# Patient Record
Sex: FEMALE | Race: WHITE | Employment: PART TIME | URBAN - NONMETROPOLITAN AREA
[De-identification: names, ages, dates, MRNs, and addresses within clinical notes are randomized per-mention and may not be internally consistent; named-entity substitution may affect disease eponyms.]

---

## 2017-07-22 ENCOUNTER — HOSPITAL ENCOUNTER (EMERGENCY)
Age: 37
Discharge: HOME | End: 2017-07-23
Attending: FAMILY MEDICINE
Payer: COMMERCIAL

## 2017-07-22 ENCOUNTER — APPOINTMENT (OUTPATIENT)
Dept: CT IMAGING | Age: 37
End: 2017-07-22
Payer: COMMERCIAL

## 2017-07-22 DIAGNOSIS — E03.9 HYPOTHYROIDISM, UNSPECIFIED TYPE: ICD-10-CM

## 2017-07-22 DIAGNOSIS — R20.0 NUMBNESS: Primary | ICD-10-CM

## 2017-07-22 DIAGNOSIS — G56.02 CARPAL TUNNEL SYNDROME OF LEFT WRIST: ICD-10-CM

## 2017-07-22 LAB
ANION GAP SERPL CALCULATED.3IONS-SCNC: 13 MEQ/L (ref 8–16)
BASOPHILS # BLD: 0.8 %
BASOPHILS ABSOLUTE: 0.1 THOU/MM3 (ref 0–0.1)
BUN BLDV-MCNC: 17 MG/DL (ref 7–22)
CALCIUM SERPL-MCNC: 9 MG/DL (ref 8.5–10.5)
CHLORIDE BLD-SCNC: 102 MEQ/L (ref 98–111)
CO2: 23 MEQ/L (ref 23–33)
CREAT SERPL-MCNC: 0.8 MG/DL (ref 0.4–1.2)
D-DIMER QUANTITATIVE: 667 NG/ML FEU (ref 0–500)
EOSINOPHIL # BLD: 3.6 %
EOSINOPHILS ABSOLUTE: 0.3 THOU/MM3 (ref 0–0.4)
GFR SERPL CREATININE-BSD FRML MDRD: 80 ML/MIN/1.73M2
GLUCOSE BLD-MCNC: 101 MG/DL (ref 70–108)
GLUCOSE BLD-MCNC: 97 MG/DL (ref 70–108)
HCT VFR BLD CALC: 39.3 % (ref 37–47)
HEMOGLOBIN: 13.3 GM/DL (ref 12–16)
LYMPHOCYTES # BLD: 31.1 %
LYMPHOCYTES ABSOLUTE: 2.2 THOU/MM3 (ref 1–4.8)
MAGNESIUM: 1.7 MG/DL (ref 1.6–2.4)
MCH RBC QN AUTO: 29.3 PG (ref 27–31)
MCHC RBC AUTO-ENTMCNC: 33.9 GM/DL (ref 33–37)
MCV RBC AUTO: 86.5 FL (ref 81–99)
MONOCYTES # BLD: 6.1 %
MONOCYTES ABSOLUTE: 0.4 THOU/MM3 (ref 0.4–1.3)
NUCLEATED RED BLOOD CELLS: 0 /100 WBC
OSMOLALITY CALCULATION: 277.1 MOSMOL/KG (ref 275–300)
PDW BLD-RTO: 14.4 % (ref 11.5–14.5)
PLATELET # BLD: 294 THOU/MM3 (ref 130–400)
PMV BLD AUTO: 7.8 MCM (ref 7.4–10.4)
POTASSIUM SERPL-SCNC: 4.4 MEQ/L (ref 3.5–5.2)
RBC # BLD: 4.54 MILL/MM3 (ref 4.2–5.4)
RBC # BLD: NORMAL 10*6/UL
SEG NEUTROPHILS: 58.4 %
SEGMENTED NEUTROPHILS ABSOLUTE COUNT: 4.1 THOU/MM3 (ref 1.8–7.7)
SODIUM BLD-SCNC: 138 MEQ/L (ref 135–145)
WBC # BLD: 7 THOU/MM3 (ref 4.8–10.8)

## 2017-07-22 PROCEDURE — 83735 ASSAY OF MAGNESIUM: CPT

## 2017-07-22 PROCEDURE — 84443 ASSAY THYROID STIM HORMONE: CPT

## 2017-07-22 PROCEDURE — 80048 BASIC METABOLIC PNL TOTAL CA: CPT

## 2017-07-22 PROCEDURE — 85379 FIBRIN DEGRADATION QUANT: CPT

## 2017-07-22 PROCEDURE — 84484 ASSAY OF TROPONIN QUANT: CPT

## 2017-07-22 PROCEDURE — 85025 COMPLETE CBC W/AUTO DIFF WBC: CPT

## 2017-07-22 PROCEDURE — 93005 ELECTROCARDIOGRAM TRACING: CPT

## 2017-07-22 PROCEDURE — 87086 URINE CULTURE/COLONY COUNT: CPT

## 2017-07-22 PROCEDURE — 99285 EMERGENCY DEPT VISIT HI MDM: CPT

## 2017-07-22 PROCEDURE — 84439 ASSAY OF FREE THYROXINE: CPT

## 2017-07-22 PROCEDURE — 82948 REAGENT STRIP/BLOOD GLUCOSE: CPT

## 2017-07-22 PROCEDURE — 36415 COLL VENOUS BLD VENIPUNCTURE: CPT

## 2017-07-22 RX ORDER — SODIUM CHLORIDE 9 MG/ML
INJECTION, SOLUTION INTRAVENOUS CONTINUOUS
Status: DISCONTINUED | OUTPATIENT
Start: 2017-07-22 | End: 2017-07-23 | Stop reason: HOSPADM

## 2017-07-22 RX ORDER — LORAZEPAM 2 MG/ML
0.5 INJECTION INTRAMUSCULAR ONCE
Status: COMPLETED | OUTPATIENT
Start: 2017-07-22 | End: 2017-07-23

## 2017-07-22 ASSESSMENT — ENCOUNTER SYMPTOMS
COUGH: 0
VOMITING: 0
NAUSEA: 0
ABDOMINAL PAIN: 0
RHINORRHEA: 0
WHEEZING: 0
SORE THROAT: 0
DIARRHEA: 0
EYE DISCHARGE: 0
EYE PAIN: 0
BACK PAIN: 0
SHORTNESS OF BREATH: 0

## 2017-07-22 ASSESSMENT — PAIN DESCRIPTION - PAIN TYPE: TYPE: SURGICAL PAIN

## 2017-07-22 ASSESSMENT — PAIN SCALES - GENERAL: PAINLEVEL_OUTOF10: 5

## 2017-07-22 ASSESSMENT — PAIN DESCRIPTION - ORIENTATION: ORIENTATION: RIGHT

## 2017-07-22 ASSESSMENT — PAIN DESCRIPTION - LOCATION: LOCATION: KNEE

## 2017-07-23 ENCOUNTER — APPOINTMENT (OUTPATIENT)
Dept: CT IMAGING | Age: 37
End: 2017-07-23
Payer: COMMERCIAL

## 2017-07-23 ENCOUNTER — APPOINTMENT (OUTPATIENT)
Dept: GENERAL RADIOLOGY | Age: 37
End: 2017-07-23
Payer: COMMERCIAL

## 2017-07-23 VITALS
BODY MASS INDEX: 40.72 KG/M2 | HEART RATE: 68 BPM | DIASTOLIC BLOOD PRESSURE: 64 MMHG | TEMPERATURE: 98.1 F | WEIGHT: 260 LBS | RESPIRATION RATE: 18 BRPM | OXYGEN SATURATION: 98 % | SYSTOLIC BLOOD PRESSURE: 114 MMHG

## 2017-07-23 LAB
BACTERIA: ABNORMAL /HPF
BILIRUBIN URINE: NEGATIVE
BLOOD, URINE: NEGATIVE
CASTS 2: ABNORMAL /LPF
CASTS UA: ABNORMAL /LPF
CHARACTER, URINE: ABNORMAL
COLOR: YELLOW
CRYSTALS, UA: ABNORMAL
EKG ATRIAL RATE: 78 BPM
EKG P AXIS: 59 DEGREES
EKG P-R INTERVAL: 124 MS
EKG Q-T INTERVAL: 364 MS
EKG QRS DURATION: 90 MS
EKG QTC CALCULATION (BAZETT): 414 MS
EKG R AXIS: 70 DEGREES
EKG T AXIS: 58 DEGREES
EKG VENTRICULAR RATE: 78 BPM
EPITHELIAL CELLS, UA: ABNORMAL /HPF
GLUCOSE URINE: NEGATIVE MG/DL
KETONES, URINE: NEGATIVE
LEUKOCYTE ESTERASE, URINE: ABNORMAL
MISCELLANEOUS 2: ABNORMAL
NITRITE, URINE: NEGATIVE
PH UA: 6
PREGNANCY, URINE: NEGATIVE
PROTEIN UA: NEGATIVE
RBC URINE: ABNORMAL /HPF
RENAL EPITHELIAL, UA: ABNORMAL
SPECIFIC GRAVITY, URINE: 1.02 (ref 1–1.03)
T4 FREE: 0.88 NG/DL (ref 0.93–1.76)
TROPONIN T: < 0.01 NG/ML
TSH SERPL DL<=0.05 MIU/L-ACNC: 5.17 UIU/ML (ref 0.4–4.2)
UROBILINOGEN, URINE: 0.2 EU/DL
WBC UA: ABNORMAL /HPF
YEAST: ABNORMAL

## 2017-07-23 PROCEDURE — 2580000003 HC RX 258: Performed by: FAMILY MEDICINE

## 2017-07-23 PROCEDURE — 6370000000 HC RX 637 (ALT 250 FOR IP): Performed by: FAMILY MEDICINE

## 2017-07-23 PROCEDURE — 70450 CT HEAD/BRAIN W/O DYE: CPT

## 2017-07-23 PROCEDURE — 81025 URINE PREGNANCY TEST: CPT

## 2017-07-23 PROCEDURE — 6360000004 HC RX CONTRAST MEDICATION: Performed by: FAMILY MEDICINE

## 2017-07-23 PROCEDURE — 6360000002 HC RX W HCPCS: Performed by: FAMILY MEDICINE

## 2017-07-23 PROCEDURE — 81001 URINALYSIS AUTO W/SCOPE: CPT

## 2017-07-23 PROCEDURE — 71275 CT ANGIOGRAPHY CHEST: CPT

## 2017-07-23 PROCEDURE — 96361 HYDRATE IV INFUSION ADD-ON: CPT

## 2017-07-23 PROCEDURE — 96374 THER/PROPH/DIAG INJ IV PUSH: CPT

## 2017-07-23 RX ORDER — OXYCODONE HYDROCHLORIDE AND ACETAMINOPHEN 5; 325 MG/1; MG/1
1 TABLET ORAL ONCE
Status: COMPLETED | OUTPATIENT
Start: 2017-07-23 | End: 2017-07-23

## 2017-07-23 RX ADMIN — LORAZEPAM 0.5 MG: 2 INJECTION INTRAMUSCULAR; INTRAVENOUS at 00:29

## 2017-07-23 RX ADMIN — SODIUM CHLORIDE: 9 INJECTION, SOLUTION INTRAVENOUS at 00:29

## 2017-07-23 RX ADMIN — IOPAMIDOL 80 ML: 755 INJECTION, SOLUTION INTRAVENOUS at 00:40

## 2017-07-23 RX ADMIN — OXYCODONE HYDROCHLORIDE AND ACETAMINOPHEN 1 TABLET: 5; 325 TABLET ORAL at 01:46

## 2017-07-23 ASSESSMENT — PAIN SCALES - GENERAL: PAINLEVEL_OUTOF10: 7

## 2017-07-25 LAB
ORGANISM: ABNORMAL
URINE CULTURE REFLEX: ABNORMAL

## 2017-09-22 ENCOUNTER — HOSPITAL ENCOUNTER (EMERGENCY)
Age: 37
Discharge: HOME | End: 2017-09-22
Payer: COMMERCIAL

## 2017-09-22 VITALS
SYSTOLIC BLOOD PRESSURE: 123 MMHG | WEIGHT: 274.5 LBS | HEART RATE: 79 BPM | DIASTOLIC BLOOD PRESSURE: 95 MMHG | TEMPERATURE: 97.2 F | RESPIRATION RATE: 20 BRPM | HEIGHT: 67 IN | BODY MASS INDEX: 43.08 KG/M2 | OXYGEN SATURATION: 95 %

## 2017-09-22 DIAGNOSIS — J20.9 ACUTE BRONCHITIS WITH BRONCHOSPASM: Primary | ICD-10-CM

## 2017-09-22 DIAGNOSIS — Z72.0 TOBACCO ABUSE: ICD-10-CM

## 2017-09-22 DIAGNOSIS — J01.10 ACUTE NON-RECURRENT FRONTAL SINUSITIS: ICD-10-CM

## 2017-09-22 PROCEDURE — 99214 OFFICE O/P EST MOD 30 MIN: CPT | Performed by: NURSE PRACTITIONER

## 2017-09-22 PROCEDURE — 96372 THER/PROPH/DIAG INJ SC/IM: CPT

## 2017-09-22 PROCEDURE — 99212 OFFICE O/P EST SF 10 MIN: CPT

## 2017-09-22 PROCEDURE — 6360000002 HC RX W HCPCS: Performed by: NURSE PRACTITIONER

## 2017-09-22 RX ORDER — METHYLPREDNISOLONE ACETATE 80 MG/ML
80 INJECTION, SUSPENSION INTRA-ARTICULAR; INTRALESIONAL; INTRAMUSCULAR; SOFT TISSUE ONCE
Status: COMPLETED | OUTPATIENT
Start: 2017-09-22 | End: 2017-09-22

## 2017-09-22 RX ORDER — AMITRIPTYLINE HYDROCHLORIDE 25 MG/1
2300 TABLET, FILM COATED ORAL NIGHTLY
COMMUNITY
End: 2019-03-08

## 2017-09-22 RX ORDER — ALBUTEROL SULFATE 90 UG/1
2 AEROSOL, METERED RESPIRATORY (INHALATION) EVERY 6 HOURS PRN
Qty: 1 INHALER | Refills: 0 | Status: SHIPPED | OUTPATIENT
Start: 2017-09-22 | End: 2019-02-19 | Stop reason: ALTCHOICE

## 2017-09-22 RX ORDER — DOXYCYCLINE HYCLATE 100 MG/1
100 CAPSULE ORAL 2 TIMES DAILY
Qty: 14 CAPSULE | Refills: 0 | Status: SHIPPED | OUTPATIENT
Start: 2017-09-22 | End: 2017-09-29

## 2017-09-22 RX ORDER — ALBUTEROL SULFATE 2.5 MG/3ML
2.5 SOLUTION RESPIRATORY (INHALATION) ONCE
Status: COMPLETED | OUTPATIENT
Start: 2017-09-22 | End: 2017-09-22

## 2017-09-22 RX ORDER — METHYLPREDNISOLONE ACETATE 40 MG/ML
40 INJECTION, SUSPENSION INTRA-ARTICULAR; INTRALESIONAL; INTRAMUSCULAR; SOFT TISSUE ONCE
Status: COMPLETED | OUTPATIENT
Start: 2017-09-22 | End: 2017-09-22

## 2017-09-22 RX ORDER — PREDNISONE 20 MG/1
20 TABLET ORAL 2 TIMES DAILY
Qty: 10 TABLET | Refills: 0 | Status: SHIPPED | OUTPATIENT
Start: 2017-09-22 | End: 2017-09-27

## 2017-09-22 RX ADMIN — ALBUTEROL SULFATE 2.5 MG: 2.5 SOLUTION RESPIRATORY (INHALATION) at 09:51

## 2017-09-22 RX ADMIN — METHYLPREDNISOLONE ACETATE 40 MG: 40 INJECTION, SUSPENSION INTRA-ARTICULAR; INTRALESIONAL; INTRAMUSCULAR; SOFT TISSUE at 09:57

## 2017-09-22 RX ADMIN — METHYLPREDNISOLONE ACETATE 80 MG: 80 INJECTION, SUSPENSION INTRA-ARTICULAR; INTRALESIONAL; INTRAMUSCULAR; SOFT TISSUE at 09:57

## 2017-09-22 ASSESSMENT — ENCOUNTER SYMPTOMS
WHEEZING: 1
COUGH: 1
SWOLLEN GLANDS: 0
NAUSEA: 0
VOMITING: 0
SORE THROAT: 1
RHINORRHEA: 1
SNORING: 0
HOARSE VOICE: 0

## 2017-09-24 ASSESSMENT — ENCOUNTER SYMPTOMS
CHEST TIGHTNESS: 0
SHORTNESS OF BREATH: 1
SINUS PRESSURE: 0
STRIDOR: 0
TROUBLE SWALLOWING: 0
VOICE CHANGE: 0

## 2017-10-23 ENCOUNTER — HOSPITAL ENCOUNTER (EMERGENCY)
Age: 37
Discharge: HOME | End: 2017-10-23
Payer: COMMERCIAL

## 2017-10-23 VITALS
OXYGEN SATURATION: 97 % | HEART RATE: 106 BPM | TEMPERATURE: 97.7 F | DIASTOLIC BLOOD PRESSURE: 99 MMHG | BODY MASS INDEX: 42.38 KG/M2 | RESPIRATION RATE: 20 BRPM | HEIGHT: 67 IN | SYSTOLIC BLOOD PRESSURE: 150 MMHG | WEIGHT: 270 LBS

## 2017-10-23 DIAGNOSIS — S39.012A STRAIN OF LUMBAR REGION, INITIAL ENCOUNTER: Primary | ICD-10-CM

## 2017-10-23 LAB
ALBUMIN SERPL-MCNC: 4 G/DL (ref 3.5–5.1)
ALP BLD-CCNC: 151 U/L (ref 38–126)
ALT SERPL-CCNC: 22 U/L (ref 11–66)
ANION GAP SERPL CALCULATED.3IONS-SCNC: 13 MEQ/L (ref 8–16)
AST SERPL-CCNC: 18 U/L (ref 5–40)
BASOPHILS # BLD: 0.9 %
BASOPHILS ABSOLUTE: 0.1 THOU/MM3 (ref 0–0.1)
BILIRUB SERPL-MCNC: < 0.2 MG/DL (ref 0.3–1.2)
BILIRUBIN URINE: NEGATIVE
BLOOD, URINE: NEGATIVE
BUN BLDV-MCNC: 17 MG/DL (ref 7–22)
CALCIUM SERPL-MCNC: 9 MG/DL (ref 8.5–10.5)
CHARACTER, URINE: CLEAR
CHLORIDE BLD-SCNC: 98 MEQ/L (ref 98–111)
CO2: 26 MEQ/L (ref 23–33)
COLOR: YELLOW
CREAT SERPL-MCNC: 0.8 MG/DL (ref 0.4–1.2)
EOSINOPHIL # BLD: 1.4 %
EOSINOPHILS ABSOLUTE: 0.1 THOU/MM3 (ref 0–0.4)
GFR SERPL CREATININE-BSD FRML MDRD: 80 ML/MIN/1.73M2
GLUCOSE BLD-MCNC: 95 MG/DL (ref 70–108)
GLUCOSE URINE: NEGATIVE MG/DL
HCT VFR BLD CALC: 42 % (ref 37–47)
HEMOGLOBIN: 14.2 GM/DL (ref 12–16)
KETONES, URINE: NEGATIVE
LEUKOCYTE ESTERASE, URINE: NEGATIVE
LYMPHOCYTES # BLD: 26.8 %
LYMPHOCYTES ABSOLUTE: 1.9 THOU/MM3 (ref 1–4.8)
MCH RBC QN AUTO: 29.7 PG (ref 27–31)
MCHC RBC AUTO-ENTMCNC: 33.7 GM/DL (ref 33–37)
MCV RBC AUTO: 88.2 FL (ref 81–99)
MONOCYTES # BLD: 4 %
MONOCYTES ABSOLUTE: 0.3 THOU/MM3 (ref 0.4–1.3)
NITRITE, URINE: NEGATIVE
NUCLEATED RED BLOOD CELLS: 0 /100 WBC
OSMOLALITY CALCULATION: 275.2 MOSMOL/KG (ref 275–300)
PDW BLD-RTO: 13.8 % (ref 11.5–14.5)
PH UA: 6
PLATELET # BLD: 345 THOU/MM3 (ref 130–400)
PMV BLD AUTO: 8 MCM (ref 7.4–10.4)
POTASSIUM SERPL-SCNC: 4.1 MEQ/L (ref 3.5–5.2)
PREGNANCY, URINE: NEGATIVE
PROTEIN UA: NEGATIVE
RBC # BLD: 4.76 MILL/MM3 (ref 4.2–5.4)
RBC # BLD: NORMAL 10*6/UL
SEG NEUTROPHILS: 66.9 %
SEGMENTED NEUTROPHILS ABSOLUTE COUNT: 4.7 THOU/MM3 (ref 1.8–7.7)
SODIUM BLD-SCNC: 137 MEQ/L (ref 135–145)
SPECIFIC GRAVITY, URINE: 1.01 (ref 1–1.03)
TOTAL PROTEIN: 7.2 G/DL (ref 6.1–8)
UROBILINOGEN, URINE: 0.2 EU/DL
WBC # BLD: 7.1 THOU/MM3 (ref 4.8–10.8)

## 2017-10-23 PROCEDURE — 96372 THER/PROPH/DIAG INJ SC/IM: CPT

## 2017-10-23 PROCEDURE — 36415 COLL VENOUS BLD VENIPUNCTURE: CPT

## 2017-10-23 PROCEDURE — 99283 EMERGENCY DEPT VISIT LOW MDM: CPT

## 2017-10-23 PROCEDURE — 85025 COMPLETE CBC W/AUTO DIFF WBC: CPT

## 2017-10-23 PROCEDURE — 81025 URINE PREGNANCY TEST: CPT

## 2017-10-23 PROCEDURE — 81003 URINALYSIS AUTO W/O SCOPE: CPT

## 2017-10-23 PROCEDURE — 80053 COMPREHEN METABOLIC PANEL: CPT

## 2017-10-23 PROCEDURE — 6360000002 HC RX W HCPCS: Performed by: STUDENT IN AN ORGANIZED HEALTH CARE EDUCATION/TRAINING PROGRAM

## 2017-10-23 RX ORDER — KETOROLAC TROMETHAMINE 30 MG/ML
30 INJECTION, SOLUTION INTRAMUSCULAR; INTRAVENOUS ONCE
Status: COMPLETED | OUTPATIENT
Start: 2017-10-23 | End: 2017-10-23

## 2017-10-23 RX ORDER — ACETAMINOPHEN 500 MG
500 TABLET ORAL EVERY 6 HOURS PRN
Qty: 120 TABLET | Refills: 3 | Status: SHIPPED | OUTPATIENT
Start: 2017-10-23

## 2017-10-23 RX ADMIN — KETOROLAC TROMETHAMINE 30 MG: 30 INJECTION, SOLUTION INTRAMUSCULAR at 15:33

## 2017-10-23 ASSESSMENT — ENCOUNTER SYMPTOMS
SORE THROAT: 0
ABDOMINAL PAIN: 0
VOMITING: 0
EYE DISCHARGE: 0
RHINORRHEA: 0
WHEEZING: 0
BACK PAIN: 1
DIARRHEA: 0
COUGH: 0
NAUSEA: 1
SHORTNESS OF BREATH: 0
EYE PAIN: 0

## 2017-10-23 ASSESSMENT — PAIN SCALES - GENERAL
PAINLEVEL_OUTOF10: 10

## 2017-10-23 ASSESSMENT — PAIN DESCRIPTION - PROGRESSION: CLINICAL_PROGRESSION: NOT CHANGED

## 2017-10-23 ASSESSMENT — PAIN DESCRIPTION - DESCRIPTORS: DESCRIPTORS: ACHING;STABBING

## 2017-10-23 ASSESSMENT — PAIN DESCRIPTION - FREQUENCY: FREQUENCY: CONTINUOUS

## 2017-10-23 ASSESSMENT — PAIN DESCRIPTION - LOCATION: LOCATION: BACK

## 2017-10-23 ASSESSMENT — PAIN DESCRIPTION - PAIN TYPE: TYPE: ACUTE PAIN

## 2017-10-23 ASSESSMENT — PAIN DESCRIPTION - ORIENTATION: ORIENTATION: RIGHT;LOWER

## 2017-10-23 NOTE — ED NOTES
Patient sitting up in bed with family at bedside. Patient states \"I'm still in pain\". Respirations easy and unlabored. [place] notified.      Jasmin Guillaume RN  10/23/17 9719

## 2017-10-23 NOTE — ED PROVIDER NOTES
[facility]  eMERGENCY dEPARTMENT eNCOUnter          CHIEF COMPLAINT       Chief Complaint   Patient presents with    Back Pain       Nurses Notes reviewed and I agree except as noted in the HPI. HISTORY OF PRESENT ILLNESS    Monalisa Danielle is a 44 y.o. female who presents to the Emergency Department for the evaluation of right-sided lower back pain. Patient states that she had her menstrual cycle last week, and that she began to have back pain with it. She states that she then finished her menstrual cycle, but that her pain never went away. She rates her pain at a 10/10 in severity, and states that the pain is aching and stabbing in quality. She reports that she has tried naprosyn, icing her back, heating pads, and warm baths all without relief. Patient states that she has to roll out of bed in the morning because her pain is so severe. She reports that she is also experiencing increased frequency of urination, urgency, nausea, chills, and decreased appetite. She denies that she has experienced any fever, chest pain, shortness of breath, bowel changes, hematuria, dysuria, or loss of bowel or bladder control. Patient states that she has a history of pancreatitis, UTI, anxiety, and alcoholism. She denies that this pain is the same as her previous UTIs because she usually has dysuria. Patient denies further complaints at initial encounter. Location/Symptom: Right-sided back pain  Timing/Onset: 1 week ago  Context/Setting: Patient states that she has a history of UTI, but that this pain is different than usual.  Quality: Aching and stabbing  Duration: Continuous  Modifying Factors: Naprosyn, icing her back, and heating packs do not relieve the pain. Severity: 10/10    The HPI was provided by the patient. REVIEW OF SYSTEMS     Review of Systems   Constitutional: Positive for appetite change (decreased) and chills. Negative for fatigue and fever.    HENT: Negative for congestion, ear pain, rhinorrhea and sore throat. Eyes: Negative for pain, discharge and visual disturbance. Respiratory: Negative for cough, shortness of breath and wheezing. Cardiovascular: Negative for chest pain, palpitations and leg swelling. Gastrointestinal: Positive for nausea. Negative for abdominal pain, diarrhea and vomiting. Genitourinary: Positive for flank pain (right-sided), frequency and urgency. Negative for decreased urine volume, difficulty urinating, dysuria, hematuria and vaginal discharge. Musculoskeletal: Positive for back pain (right-sided lower). Negative for arthralgias, joint swelling and neck pain. Skin: Negative for pallor and rash. Neurological: Negative for dizziness, syncope, weakness, light-headedness and headaches. Hematological: Negative for adenopathy. Psychiatric/Behavioral: Negative for confusion, dysphoric mood and suicidal ideas. The patient is not nervous/anxious. PAST MEDICAL HISTORY    has a past medical history of Allergic rhinitis; Anxiety; CAD (coronary artery disease); Depression (emotion); DUB (dysfunctional uterine bleeding); Generalized anxiety disorder; GERD (gastroesophageal reflux disease); Hypertriglyceridemia; Hypoglycemia; Hypothyroid; Movement disorder; Pancreatitis; Psychiatric problem; Scoliosis; and Tobacco abuse. SURGICAL HISTORY      has a past surgical history that includes fracture surgery and knee surgery (08/2017). CURRENT MEDICATIONS       Previous Medications    ACETAMINOPHEN 650 MG TABS    Take 650 mg by mouth every 4 hours as needed for Fever    ALBUTEROL SULFATE  (90 BASE) MCG/ACT INHALER    Inhale 2 puffs into the lungs every 6 hours as needed for Wheezing or Shortness of Breath    AMITRIPTYLINE (ELAVIL) 25 MG TABLET    Take 25 mg by mouth nightly    FLUOXETINE (PROZAC) 20 MG CAPSULE    Take 20 mg by mouth daily    LEVOTHYROXINE (SYNTHROID) 25 MCG TABLET    Take 25 mcg by mouth Daily.     ONDANSETRON (ZOFRAN) 4 MG TABLET Take 1 tablet by mouth every 8 hours as needed for Nausea    PANTOPRAZOLE (PROTONIX) 40 MG TABLET    Take 1 tablet by mouth daily    PSEUDOEPHEDRINE (SUDAFED 24 HOUR NON-DROWSY) 240 MG TB24 EXTENDED RELEASE TABLET    Take 1 tablet by mouth daily as needed (congestion, pressure) Daily for nasal congestion       ALLERGIES     is allergic to bee pollen. FAMILY HISTORY     indicated that the status of her mother is unknown. She indicated that the status of her father is unknown. She indicated that the status of her sister is unknown.    family history includes Cancer in her sister; Diabetes in her father and mother; Heart Disease in her father; High Blood Pressure in her mother; Thyroid Disease in her mother. SOCIAL HISTORY      reports that she has been smoking Cigarettes. She has a 15.00 pack-year smoking history. She has never used smokeless tobacco. She reports that she does not drink alcohol or use drugs. PHYSICAL EXAM     INITIAL VITALS:  height is 5' 7\" (1.702 m) and weight is 270 lb (122.5 kg). Her oral temperature is 97.7 °F (36.5 °C). Her blood pressure is 150/99 (abnormal) and her pulse is 106. Her respiration is 20 and oxygen saturation is 97%. Physical Exam   Constitutional: She is oriented to person, place, and time. She appears well-developed and well-nourished. She is active and cooperative. HENT:   Head: Normocephalic and atraumatic. Right Ear: Hearing, tympanic membrane, external ear and ear canal normal.   Left Ear: Hearing, tympanic membrane, external ear and ear canal normal.   Eyes: Conjunctivae and EOM are normal. Right eye exhibits no discharge. Left eye exhibits no discharge. No scleral icterus. Neck: Trachea normal, normal range of motion and full passive range of motion without pain. Neck supple. No JVD present. No tracheal deviation present. Cardiovascular: Normal heart sounds, intact distal pulses and normal pulses. Tachycardia present.   Exam reveals no gallop and no friction rub. No murmur heard. Pulmonary/Chest: Effort normal and breath sounds normal. No stridor. No respiratory distress. She has no decreased breath sounds. She has no wheezes. She has no rhonchi. She has no rales. Abdominal: Soft. Normal appearance and bowel sounds are normal. She exhibits no distension. There is tenderness in the suprapubic area. There is CVA tenderness (right-sided). There is no rebound, no guarding, no tenderness at McBurney's point and negative Shaw's sign. Musculoskeletal: Normal range of motion. She exhibits no edema. Cervical back: Normal.        Thoracic back: Normal.        Lumbar back: Normal. She exhibits normal range of motion, no tenderness, no deformity and no laceration. Neurological: She is alert and oriented to person, place, and time. She is not disoriented. No sensory deficit. She exhibits normal muscle tone. She displays no seizure activity. GCS eye subscore is 4. GCS verbal subscore is 5. GCS motor subscore is 6. Skin: Skin is warm and dry. No rash noted. She is not diaphoretic. Psychiatric: She has a normal mood and affect. Her speech is normal and behavior is normal. Thought content normal.   Nursing note and vitals reviewed.       DIFFERENTIAL DIAGNOSIS:   UTI, lumbar strain, nephrolithiasis, pyelonephritis    DIAGNOSTIC RESULTS     EKG: All EKG's are interpreted by the Emergency Department Physician who either signs or Co-signs this chart in the absence of a cardiologist.    None    RADIOLOGY: non-plain film images(s) such as CT, Ultrasound and MRI are read by the radiologist.    No orders to display       LABS:     Labs Reviewed   CBC WITH AUTO DIFFERENTIAL - Abnormal; Notable for the following:        Result Value    Monocytes # 0.3 (*)     All other components within normal limits   COMPREHENSIVE METABOLIC PANEL - Abnormal; Notable for the following:     Alkaline Phosphatase 151 (*)     Total Bilirubin <0.2 (*)     All other components within

## 2017-10-23 NOTE — ED NOTES
Patient had call light on. Patient states pain medication \"did not work\". Patient states \"when I press in\" on the right flank \"I feel a lump\". Patient sitting up in bed. Patient changes positions without difficulty. No non-verbal cues of pain observed at this time.  and son in room. Gris Winslow, [address] [place] notified of patient's statements.       [place] NI Parra  10/23/17 [id number]

## 2018-01-14 ENCOUNTER — APPOINTMENT (OUTPATIENT)
Dept: GENERAL RADIOLOGY | Age: 38
End: 2018-01-14
Payer: COMMERCIAL

## 2018-01-14 ENCOUNTER — HOSPITAL ENCOUNTER (EMERGENCY)
Age: 38
Discharge: HOME | End: 2018-01-14
Payer: COMMERCIAL

## 2018-01-14 VITALS — TEMPERATURE: 98.1 F | RESPIRATION RATE: 16 BRPM | OXYGEN SATURATION: 100 % | HEART RATE: 100 BPM

## 2018-01-14 DIAGNOSIS — T14.8XXA BITE BY ANIMAL: Primary | ICD-10-CM

## 2018-01-14 PROCEDURE — 6370000000 HC RX 637 (ALT 250 FOR IP): Performed by: NURSE PRACTITIONER

## 2018-01-14 PROCEDURE — A6446 CONFORM BAND S W>=3" <5"/YD: HCPCS

## 2018-01-14 PROCEDURE — 90715 TDAP VACCINE 7 YRS/> IM: CPT | Performed by: NURSE PRACTITIONER

## 2018-01-14 PROCEDURE — 90471 IMMUNIZATION ADMIN: CPT | Performed by: NURSE PRACTITIONER

## 2018-01-14 PROCEDURE — 99283 EMERGENCY DEPT VISIT LOW MDM: CPT

## 2018-01-14 PROCEDURE — 73110 X-RAY EXAM OF WRIST: CPT

## 2018-01-14 PROCEDURE — 6360000002 HC RX W HCPCS: Performed by: NURSE PRACTITIONER

## 2018-01-14 PROCEDURE — 73130 X-RAY EXAM OF HAND: CPT

## 2018-01-14 RX ORDER — HYDROCODONE BITARTRATE AND ACETAMINOPHEN 5; 325 MG/1; MG/1
1 TABLET ORAL ONCE
Status: COMPLETED | OUTPATIENT
Start: 2018-01-14 | End: 2018-01-14

## 2018-01-14 RX ORDER — AMOXICILLIN AND CLAVULANATE POTASSIUM 875; 125 MG/1; MG/1
1 TABLET, FILM COATED ORAL 2 TIMES DAILY
Qty: 20 TABLET | Refills: 0 | Status: SHIPPED | OUTPATIENT
Start: 2018-01-14 | End: 2018-01-24

## 2018-01-14 RX ORDER — TRAMADOL HYDROCHLORIDE 50 MG/1
50 TABLET ORAL EVERY 6 HOURS PRN
Qty: 12 TABLET | Refills: 0 | Status: SHIPPED | OUTPATIENT
Start: 2018-01-14 | End: 2018-01-17

## 2018-01-14 RX ADMIN — HYDROCODONE BITARTRATE AND ACETAMINOPHEN 1 TABLET: 5; 325 TABLET ORAL at 20:08

## 2018-01-14 RX ADMIN — TETANUS TOXOID, REDUCED DIPHTHERIA TOXOID AND ACELLULAR PERTUSSIS VACCINE, ADSORBED 0.5 ML: 5; 2.5; 8; 8; 2.5 SUSPENSION INTRAMUSCULAR at 20:20

## 2018-01-14 ASSESSMENT — ENCOUNTER SYMPTOMS
BACK PAIN: 0
VOMITING: 0
COUGH: 0
NAUSEA: 0
RHINORRHEA: 0
DIARRHEA: 0
ABDOMINAL PAIN: 0
WHEEZING: 0
EYE DISCHARGE: 0
SORE THROAT: 0
EYE PAIN: 0
SHORTNESS OF BREATH: 0

## 2018-01-14 ASSESSMENT — PAIN SCALES - GENERAL
PAINLEVEL_OUTOF10: 10

## 2018-01-14 ASSESSMENT — PAIN DESCRIPTION - ORIENTATION: ORIENTATION: RIGHT

## 2018-01-14 ASSESSMENT — PAIN DESCRIPTION - LOCATION: LOCATION: HAND

## 2018-01-15 ENCOUNTER — NURSE TRIAGE (OUTPATIENT)
Dept: ADMINISTRATIVE | Age: 38
End: 2018-01-15

## 2018-01-15 NOTE — ED PROVIDER NOTES
[facility]       Chief Complaint   Patient presents with    Animal Bite       Nurses Notes reviewed and I agree except as noted in the HPI. HISTORY OF PRESENT ILLNESS    Chucky Abdul is a 44 y.o. female who presents to the ED with an animal bite to her right hand. She states his family dog bit her and believes the dog is up to date on his shots. She rates her hand pain as a 10/10 in severity and gets worse with movement. REVIEW OF SYSTEMS     Review of Systems   Constitutional: Negative for appetite change, chills, fatigue and fever. HENT: Negative for congestion, ear pain, rhinorrhea and sore throat. Eyes: Negative for pain, discharge and visual disturbance. Respiratory: Negative for cough, shortness of breath and wheezing. Cardiovascular: Negative for chest pain, palpitations and leg swelling. Gastrointestinal: Negative for abdominal pain, diarrhea, nausea and vomiting. Genitourinary: Negative for difficulty urinating, dysuria and vaginal discharge. Musculoskeletal: Positive for arthralgias (right hand). Negative for back pain, joint swelling and neck pain. Skin: Positive for wound (animal bite to right hand). Negative for pallor and rash. Neurological: Negative for dizziness, syncope, weakness, light-headedness and headaches. Hematological: Negative for adenopathy. Psychiatric/Behavioral: Negative for confusion, dysphoric mood and suicidal ideas. The patient is not nervous/anxious. PAST MEDICAL HISTORY    has a past medical history of Allergic rhinitis; Anxiety; CAD (coronary artery disease); Depression (emotion); DUB (dysfunctional uterine bleeding); Generalized anxiety disorder; GERD (gastroesophageal reflux disease); Hypertriglyceridemia; Hypoglycemia; Hypothyroid; Movement disorder; Pancreatitis; Psychiatric problem; Scoliosis; and Tobacco abuse.     SURGICAL HISTORY      has a past surgical history that includes and the patient will return to the ED if the symptoms become more severe in nature or otherwise change    I have given the patient strict written and verbal instructions about care at home, follow-up, and signs and symptoms of worsening of condition and they did verbalize understanding. Medications   Tetanus-Diphth-Acell Pertussis (BOOSTRIX) injection 0.5 mL (0.5 mLs Intramuscular Given 1/14/18 2020)   HYDROcodone-acetaminophen (NORCO) 5-325 MG per tablet 1 tablet (1 tablet Oral Given 1/14/18 2008)         Patient was seen independently by myself. The Patient's final impression and disposition and plan was determined by myself. CRITICAL CARE:   None    CONSULTS:  None    PROCEDURES:  None    FINAL IMPRESSION      1. Bite by animal          DISPOSITION/PLAN   Patient was discharged in stable condition. Will return if symptoms change or worsen, or for any sign or symptom deemed emergent by the patient or family members. Follow up as an outpatient, sooner if symptoms warrant. PATIENT REFERRED TO:  GRACIE Garcia [address] [phone]    Schedule an appointment as soon as possible for a visit in 3 days  For wound re-check      DISCHARGE MEDICATIONS:  Discharge Medication List as of 1/14/2018  8:46 PM      START taking these medications    Details   traMADol (ULTRAM) 50 MG tablet Take 1 tablet by mouth every 6 hours as needed for Pain for up to 3 days . Take lowest dose possible to manage pain., Disp-12 tablet, R-0Print      amoxicillin-clavulanate (AUGMENTIN) 875-125 MG per tablet Take 1 tablet by mouth 2 times daily for 10 days, Disp-20 tablet, R-0Print             (Please note that portions of this note were completed with a voice recognition program.  Efforts were made to edit the dictations but occasionally words are mis-transcribed.)    Scarlet Patel NP    Scribe:  Christy Camp 1/14/18 7:25 PM Scribing for and in the presence of Scarlet Patel CNP.     Signed by: Alexa Cristina, 01/15/18 1:03 AM    Provider:  I personally performed the services described in the documentation, reviewed and edited the documentation which was dictated to the scribe in my presence, and it accurately records my words and actions.     Rhett Rodgers CNP 01/15/18 1:03 AM            Rhett Rodgers NP  01/15/18 [id number]

## 2018-01-16 NOTE — TELEPHONE ENCOUNTER
Caller states she was seen yesterday for a dog bite and given Rx. Augmentin. She states the swelling and redness has increased slightly today. She has started her antibiotic today and denies fever. I advice caller to give the antibiotic another day and if swelling and redness keep getting worst call PCP or go to the urgent care for evaluation. Caller verbalized understanding.

## 2018-04-22 ENCOUNTER — HOSPITAL ENCOUNTER (EMERGENCY)
Age: 38
Discharge: HOME | End: 2018-04-22
Payer: COMMERCIAL

## 2018-04-22 VITALS
SYSTOLIC BLOOD PRESSURE: 113 MMHG | RESPIRATION RATE: 19 BRPM | HEART RATE: 94 BPM | TEMPERATURE: 98 F | OXYGEN SATURATION: 98 % | DIASTOLIC BLOOD PRESSURE: 68 MMHG

## 2018-04-22 DIAGNOSIS — R11.2 NON-INTRACTABLE VOMITING WITH NAUSEA, UNSPECIFIED VOMITING TYPE: Primary | ICD-10-CM

## 2018-04-22 DIAGNOSIS — N30.00 ACUTE CYSTITIS WITHOUT HEMATURIA: ICD-10-CM

## 2018-04-22 LAB
ALBUMIN SERPL-MCNC: 4.1 G/DL (ref 3.5–5.1)
ALP BLD-CCNC: 115 U/L (ref 38–126)
ALT SERPL-CCNC: 14 U/L (ref 11–66)
AMPHETAMINE+METHAMPHETAMINE URINE SCREEN: NEGATIVE
ANION GAP SERPL CALCULATED.3IONS-SCNC: 11 MEQ/L (ref 8–16)
AST SERPL-CCNC: 16 U/L (ref 5–40)
BACTERIA: ABNORMAL /HPF
BARBITURATE QUANTITATIVE URINE: NEGATIVE
BASOPHILS # BLD: 0.2 %
BASOPHILS ABSOLUTE: 0 THOU/MM3 (ref 0–0.1)
BENZODIAZEPINE QUANTITATIVE URINE: NEGATIVE
BILIRUB SERPL-MCNC: 0.2 MG/DL (ref 0.3–1.2)
BILIRUBIN DIRECT: < 0.2 MG/DL (ref 0–0.3)
BILIRUBIN URINE: NEGATIVE
BLOOD, URINE: ABNORMAL
BUN BLDV-MCNC: 19 MG/DL (ref 7–22)
CALCIUM SERPL-MCNC: 9.2 MG/DL (ref 8.5–10.5)
CANNABINOID QUANTITATIVE URINE: NEGATIVE
CASTS 2: ABNORMAL /LPF
CASTS UA: ABNORMAL /LPF
CHARACTER, URINE: ABNORMAL
CHLORIDE BLD-SCNC: 101 MEQ/L (ref 98–111)
CO2: 28 MEQ/L (ref 23–33)
COCAINE METABOLITE QUANTITATIVE URINE: NEGATIVE
COLOR: YELLOW
CREAT SERPL-MCNC: 0.7 MG/DL (ref 0.4–1.2)
CRYSTALS, UA: ABNORMAL
EKG ATRIAL RATE: 104 BPM
EKG P AXIS: 76 DEGREES
EKG P-R INTERVAL: 126 MS
EKG Q-T INTERVAL: 342 MS
EKG QRS DURATION: 86 MS
EKG QTC CALCULATION (BAZETT): 449 MS
EKG R AXIS: 76 DEGREES
EKG T AXIS: 72 DEGREES
EKG VENTRICULAR RATE: 104 BPM
EOSINOPHIL # BLD: 1.8 %
EOSINOPHILS ABSOLUTE: 0.2 THOU/MM3 (ref 0–0.4)
EPITHELIAL CELLS, UA: ABNORMAL /HPF
GFR SERPL CREATININE-BSD FRML MDRD: > 90 ML/MIN/1.73M2
GLUCOSE BLD-MCNC: 108 MG/DL (ref 70–108)
GLUCOSE URINE: NEGATIVE MG/DL
HCT VFR BLD CALC: 41.6 % (ref 37–47)
HEMOGLOBIN: 14.1 GM/DL (ref 12–16)
KETONES, URINE: NEGATIVE
LEUKOCYTE ESTERASE, URINE: ABNORMAL
LIPASE: 17 U/L (ref 5.6–51.3)
LYMPHOCYTES # BLD: 5.2 %
LYMPHOCYTES ABSOLUTE: 0.6 THOU/MM3 (ref 1–4.8)
MCH RBC QN AUTO: 28.3 PG (ref 27–31)
MCHC RBC AUTO-ENTMCNC: 34 GM/DL (ref 33–37)
MCV RBC AUTO: 83.2 FL (ref 81–99)
MISCELLANEOUS 2: ABNORMAL
MONOCYTES # BLD: 2.9 %
MONOCYTES ABSOLUTE: 0.3 THOU/MM3 (ref 0.4–1.3)
NITRITE, URINE: NEGATIVE
NUCLEATED RED BLOOD CELLS: 0 /100 WBC
OPIATES, URINE: NEGATIVE
OSMOLALITY CALCULATION: 282.2 MOSMOL/KG (ref 275–300)
OXYCODONE: NEGATIVE
PDW BLD-RTO: 14.4 % (ref 11.5–14.5)
PH UA: 6.5
PHENCYCLIDINE QUANTITATIVE URINE: NEGATIVE
PLATELET # BLD: 316 THOU/MM3 (ref 130–400)
PMV BLD AUTO: 8.1 FL (ref 7.4–10.4)
POTASSIUM SERPL-SCNC: 4.4 MEQ/L (ref 3.5–5.2)
PROTEIN UA: NEGATIVE
RBC # BLD: 5 MILL/MM3 (ref 4.2–5.4)
RBC URINE: ABNORMAL /HPF
RENAL EPITHELIAL, UA: ABNORMAL
SEG NEUTROPHILS: 89.9 %
SEGMENTED NEUTROPHILS ABSOLUTE COUNT: 10 THOU/MM3 (ref 1.8–7.7)
SODIUM BLD-SCNC: 140 MEQ/L (ref 135–145)
SPECIFIC GRAVITY, URINE: 1.02 (ref 1–1.03)
TOTAL PROTEIN: 7.1 G/DL (ref 6.1–8)
UROBILINOGEN, URINE: 0.2 EU/DL
WBC # BLD: 11.1 THOU/MM3 (ref 4.8–10.8)
WBC UA: ABNORMAL /HPF
YEAST: ABNORMAL

## 2018-04-22 PROCEDURE — 96374 THER/PROPH/DIAG INJ IV PUSH: CPT

## 2018-04-22 PROCEDURE — 87086 URINE CULTURE/COLONY COUNT: CPT

## 2018-04-22 PROCEDURE — 36415 COLL VENOUS BLD VENIPUNCTURE: CPT

## 2018-04-22 PROCEDURE — 2580000003 HC RX 258: Performed by: NURSE PRACTITIONER

## 2018-04-22 PROCEDURE — 93005 ELECTROCARDIOGRAM TRACING: CPT | Performed by: NURSE PRACTITIONER

## 2018-04-22 PROCEDURE — 99284 EMERGENCY DEPT VISIT MOD MDM: CPT

## 2018-04-22 PROCEDURE — 81001 URINALYSIS AUTO W/SCOPE: CPT

## 2018-04-22 PROCEDURE — 82248 BILIRUBIN DIRECT: CPT

## 2018-04-22 PROCEDURE — 6360000002 HC RX W HCPCS: Performed by: NURSE PRACTITIONER

## 2018-04-22 PROCEDURE — 80307 DRUG TEST PRSMV CHEM ANLYZR: CPT

## 2018-04-22 PROCEDURE — 80053 COMPREHEN METABOLIC PANEL: CPT

## 2018-04-22 PROCEDURE — 85025 COMPLETE CBC W/AUTO DIFF WBC: CPT

## 2018-04-22 PROCEDURE — 96375 TX/PRO/DX INJ NEW DRUG ADDON: CPT

## 2018-04-22 PROCEDURE — 83690 ASSAY OF LIPASE: CPT

## 2018-04-22 PROCEDURE — 81003 URINALYSIS AUTO W/O SCOPE: CPT

## 2018-04-22 RX ORDER — ONDANSETRON 2 MG/ML
4 INJECTION INTRAMUSCULAR; INTRAVENOUS ONCE
Status: COMPLETED | OUTPATIENT
Start: 2018-04-22 | End: 2018-04-22

## 2018-04-22 RX ORDER — ONDANSETRON 4 MG/1
4 TABLET, ORALLY DISINTEGRATING ORAL EVERY 8 HOURS PRN
Qty: 20 TABLET | Refills: 0 | Status: SHIPPED | OUTPATIENT
Start: 2018-04-22 | End: 2018-09-28 | Stop reason: ALTCHOICE

## 2018-04-22 RX ORDER — DIPHENHYDRAMINE HYDROCHLORIDE 50 MG/ML
25 INJECTION INTRAMUSCULAR; INTRAVENOUS ONCE
Status: COMPLETED | OUTPATIENT
Start: 2018-04-22 | End: 2018-04-22

## 2018-04-22 RX ORDER — METOCLOPRAMIDE HYDROCHLORIDE 5 MG/ML
10 INJECTION INTRAMUSCULAR; INTRAVENOUS ONCE
Status: COMPLETED | OUTPATIENT
Start: 2018-04-22 | End: 2018-04-22

## 2018-04-22 RX ORDER — 0.9 % SODIUM CHLORIDE 0.9 %
1000 INTRAVENOUS SOLUTION INTRAVENOUS ONCE
Status: COMPLETED | OUTPATIENT
Start: 2018-04-22 | End: 2018-04-22

## 2018-04-22 RX ORDER — NITROFURANTOIN 25; 75 MG/1; MG/1
100 CAPSULE ORAL 2 TIMES DAILY
Qty: 14 CAPSULE | Refills: 0 | Status: SHIPPED | OUTPATIENT
Start: 2018-04-22 | End: 2018-04-29

## 2018-04-22 RX ADMIN — DIPHENHYDRAMINE HYDROCHLORIDE 25 MG: 50 INJECTION, SOLUTION INTRAMUSCULAR; INTRAVENOUS at 20:50

## 2018-04-22 RX ADMIN — SODIUM CHLORIDE 1000 ML: 9 INJECTION, SOLUTION INTRAVENOUS at 20:50

## 2018-04-22 RX ADMIN — METOCLOPRAMIDE 10 MG: 5 INJECTION, SOLUTION INTRAMUSCULAR; INTRAVENOUS at 20:50

## 2018-04-22 RX ADMIN — ONDANSETRON 4 MG: 2 INJECTION INTRAMUSCULAR; INTRAVENOUS at 20:50

## 2018-04-22 ASSESSMENT — ENCOUNTER SYMPTOMS
SORE THROAT: 0
WHEEZING: 0
SHORTNESS OF BREATH: 0
NAUSEA: 1
COUGH: 0
EYE DISCHARGE: 0
BACK PAIN: 0
DIARRHEA: 0
VOMITING: 1
RHINORRHEA: 0
ABDOMINAL PAIN: 1
EYE PAIN: 0

## 2018-04-22 ASSESSMENT — PAIN DESCRIPTION - PAIN TYPE: TYPE: ACUTE PAIN

## 2018-04-22 ASSESSMENT — PAIN SCALES - GENERAL: PAINLEVEL_OUTOF10: 8

## 2018-04-22 ASSESSMENT — PAIN DESCRIPTION - ORIENTATION: ORIENTATION: LOWER;MID

## 2018-04-22 ASSESSMENT — PAIN DESCRIPTION - LOCATION: LOCATION: ABDOMEN

## 2018-04-22 ASSESSMENT — PAIN DESCRIPTION - FREQUENCY: FREQUENCY: CONTINUOUS

## 2018-04-23 PROCEDURE — 93010 ELECTROCARDIOGRAM REPORT: CPT | Performed by: INTERNAL MEDICINE

## 2018-04-24 LAB
ORGANISM: ABNORMAL
URINE CULTURE REFLEX: ABNORMAL

## 2018-07-26 ENCOUNTER — HOSPITAL ENCOUNTER (OUTPATIENT)
Dept: ULTRASOUND IMAGING | Age: 38
Discharge: HOME | End: 2018-07-26
Payer: COMMERCIAL

## 2018-07-26 DIAGNOSIS — N92.1 MENORRHAGIA WITH IRREGULAR CYCLE: ICD-10-CM

## 2018-07-26 PROCEDURE — 76856 US EXAM PELVIC COMPLETE: CPT

## 2018-09-28 ENCOUNTER — HOSPITAL ENCOUNTER (EMERGENCY)
Age: 38
Discharge: HOME | End: 2018-09-29
Payer: COMMERCIAL

## 2018-09-28 ENCOUNTER — APPOINTMENT (OUTPATIENT)
Dept: CT IMAGING | Age: 38
End: 2018-09-28
Payer: COMMERCIAL

## 2018-09-28 VITALS
WEIGHT: 270 LBS | HEART RATE: 70 BPM | RESPIRATION RATE: 16 BRPM | DIASTOLIC BLOOD PRESSURE: 90 MMHG | OXYGEN SATURATION: 98 % | TEMPERATURE: 97.4 F | BODY MASS INDEX: 42.29 KG/M2 | SYSTOLIC BLOOD PRESSURE: 127 MMHG

## 2018-09-28 DIAGNOSIS — N30.01 ACUTE CYSTITIS WITH HEMATURIA: Primary | ICD-10-CM

## 2018-09-28 DIAGNOSIS — R10.9 ABDOMINAL PAIN, UNSPECIFIED ABDOMINAL LOCATION: ICD-10-CM

## 2018-09-28 LAB
ALBUMIN SERPL-MCNC: 4.5 G/DL (ref 3.5–5.1)
ALP BLD-CCNC: 118 U/L (ref 38–126)
ALT SERPL-CCNC: 22 U/L (ref 11–66)
AMORPHOUS: ABNORMAL
ANION GAP SERPL CALCULATED.3IONS-SCNC: 15 MEQ/L (ref 8–16)
AST SERPL-CCNC: 23 U/L (ref 5–40)
BACTERIA: ABNORMAL /HPF
BASOPHILS # BLD: 0.6 %
BASOPHILS ABSOLUTE: 0.1 THOU/MM3 (ref 0–0.1)
BILIRUB SERPL-MCNC: < 0.2 MG/DL (ref 0.3–1.2)
BILIRUBIN DIRECT: < 0.2 MG/DL (ref 0–0.3)
BILIRUBIN URINE: NEGATIVE
BLOOD, URINE: ABNORMAL
BUN BLDV-MCNC: 10 MG/DL (ref 7–22)
CALCIUM SERPL-MCNC: 9.8 MG/DL (ref 8.5–10.5)
CASTS 2: ABNORMAL /LPF
CASTS UA: ABNORMAL /LPF
CHARACTER, URINE: ABNORMAL
CHLORIDE BLD-SCNC: 100 MEQ/L (ref 98–111)
CO2: 24 MEQ/L (ref 23–33)
COLOR: ABNORMAL
CREAT SERPL-MCNC: 0.8 MG/DL (ref 0.4–1.2)
CRYSTALS, UA: ABNORMAL
CRYSTALS, UA: ABNORMAL
EKG ATRIAL RATE: 84 BPM
EKG P AXIS: 64 DEGREES
EKG P-R INTERVAL: 136 MS
EKG Q-T INTERVAL: 360 MS
EKG QRS DURATION: 82 MS
EKG QTC CALCULATION (BAZETT): 425 MS
EKG R AXIS: 59 DEGREES
EKG T AXIS: 55 DEGREES
EKG VENTRICULAR RATE: 84 BPM
EOSINOPHIL # BLD: 1.8 %
EOSINOPHILS ABSOLUTE: 0.2 THOU/MM3 (ref 0–0.4)
EPITHELIAL CELLS, UA: ABNORMAL /HPF
ERYTHROCYTE [DISTWIDTH] IN BLOOD BY AUTOMATED COUNT: 13.5 % (ref 11.5–14.5)
ERYTHROCYTE [DISTWIDTH] IN BLOOD BY AUTOMATED COUNT: 41.6 FL (ref 35–45)
ETHYL ALCOHOL, SERUM: < 0.01 %
GFR SERPL CREATININE-BSD FRML MDRD: 80 ML/MIN/1.73M2
GLUCOSE BLD-MCNC: 114 MG/DL (ref 70–108)
GLUCOSE URINE: NEGATIVE MG/DL
HCT VFR BLD CALC: 41.1 % (ref 37–47)
HEMOGLOBIN: 13.9 GM/DL (ref 12–16)
IMMATURE GRANS (ABS): 0.02 THOU/MM3 (ref 0–0.07)
IMMATURE GRANULOCYTES: 0.2 %
KETONES, URINE: ABNORMAL
LEUKOCYTE ESTERASE, URINE: ABNORMAL
LIPASE: 19.2 U/L (ref 5.6–51.3)
LYMPHOCYTES # BLD: 30.2 %
LYMPHOCYTES ABSOLUTE: 3.2 THOU/MM3 (ref 1–4.8)
MCH RBC QN AUTO: 28.6 PG (ref 26–33)
MCHC RBC AUTO-ENTMCNC: 33.8 GM/DL (ref 32.2–35.5)
MCV RBC AUTO: 84.6 FL (ref 81–99)
MISCELLANEOUS 2: ABNORMAL
MISCELLANEOUS LAB TEST RESULT: ABNORMAL
MONOCYTES # BLD: 5.3 %
MONOCYTES ABSOLUTE: 0.6 THOU/MM3 (ref 0.4–1.3)
NITRITE, URINE: POSITIVE
NUCLEATED RED BLOOD CELLS: 0 /100 WBC
OSMOLALITY CALCULATION: 277.4 MOSMOL/KG (ref 275–300)
PH UA: 6
PLATELET # BLD: 383 THOU/MM3 (ref 130–400)
PMV BLD AUTO: 9.8 FL (ref 9.4–12.4)
POTASSIUM SERPL-SCNC: 3.9 MEQ/L (ref 3.5–5.2)
PREGNANCY, SERUM: NEGATIVE
PROTEIN UA: 100
RBC # BLD: 4.86 MILL/MM3 (ref 4.2–5.4)
RBC URINE: > 200 /HPF
RENAL EPITHELIAL, UA: ABNORMAL
SEG NEUTROPHILS: 61.9 %
SEGMENTED NEUTROPHILS ABSOLUTE COUNT: 6.6 THOU/MM3 (ref 1.8–7.7)
SODIUM BLD-SCNC: 139 MEQ/L (ref 135–145)
SPECIFIC GRAVITY, URINE: 1.01 (ref 1–1.03)
TOTAL PROTEIN: 7.5 G/DL (ref 6.1–8)
TROPONIN T: < 0.01 NG/ML
UROBILINOGEN, URINE: 1 EU/DL
WBC # BLD: 10.6 THOU/MM3 (ref 4.8–10.8)
WBC UA: ABNORMAL /HPF
YEAST: ABNORMAL

## 2018-09-28 PROCEDURE — 87077 CULTURE AEROBIC IDENTIFY: CPT

## 2018-09-28 PROCEDURE — 85025 COMPLETE CBC W/AUTO DIFF WBC: CPT

## 2018-09-28 PROCEDURE — 83690 ASSAY OF LIPASE: CPT

## 2018-09-28 PROCEDURE — 80307 DRUG TEST PRSMV CHEM ANLYZR: CPT

## 2018-09-28 PROCEDURE — 84703 CHORIONIC GONADOTROPIN ASSAY: CPT

## 2018-09-28 PROCEDURE — 87086 URINE CULTURE/COLONY COUNT: CPT

## 2018-09-28 PROCEDURE — 82248 BILIRUBIN DIRECT: CPT

## 2018-09-28 PROCEDURE — 36415 COLL VENOUS BLD VENIPUNCTURE: CPT

## 2018-09-28 PROCEDURE — G0480 DRUG TEST DEF 1-7 CLASSES: HCPCS

## 2018-09-28 PROCEDURE — 87184 SC STD DISK METHOD PER PLATE: CPT

## 2018-09-28 PROCEDURE — 6360000004 HC RX CONTRAST MEDICATION: Performed by: PHYSICIAN ASSISTANT

## 2018-09-28 PROCEDURE — 87186 SC STD MICRODIL/AGAR DIL: CPT

## 2018-09-28 PROCEDURE — 96374 THER/PROPH/DIAG INJ IV PUSH: CPT

## 2018-09-28 PROCEDURE — 81001 URINALYSIS AUTO W/SCOPE: CPT

## 2018-09-28 PROCEDURE — 6360000002 HC RX W HCPCS: Performed by: PHYSICIAN ASSISTANT

## 2018-09-28 PROCEDURE — 93005 ELECTROCARDIOGRAM TRACING: CPT | Performed by: PHYSICIAN ASSISTANT

## 2018-09-28 PROCEDURE — 96375 TX/PRO/DX INJ NEW DRUG ADDON: CPT

## 2018-09-28 PROCEDURE — 80053 COMPREHEN METABOLIC PANEL: CPT

## 2018-09-28 PROCEDURE — 84484 ASSAY OF TROPONIN QUANT: CPT

## 2018-09-28 PROCEDURE — 99284 EMERGENCY DEPT VISIT MOD MDM: CPT

## 2018-09-28 PROCEDURE — 2580000003 HC RX 258: Performed by: PHYSICIAN ASSISTANT

## 2018-09-28 PROCEDURE — 74177 CT ABD & PELVIS W/CONTRAST: CPT

## 2018-09-28 RX ORDER — ONDANSETRON 4 MG/1
4 TABLET, ORALLY DISINTEGRATING ORAL EVERY 8 HOURS PRN
Qty: 20 TABLET | Refills: 0 | Status: SHIPPED | OUTPATIENT
Start: 2018-09-28 | End: 2018-12-28

## 2018-09-28 RX ORDER — PHENAZOPYRIDINE HYDROCHLORIDE 100 MG/1
200 TABLET, FILM COATED ORAL 3 TIMES DAILY PRN
Qty: 12 TABLET | Refills: 0 | Status: SHIPPED | OUTPATIENT
Start: 2018-09-28 | End: 2018-12-28

## 2018-09-28 RX ORDER — ONDANSETRON 2 MG/ML
4 INJECTION INTRAMUSCULAR; INTRAVENOUS ONCE
Status: COMPLETED | OUTPATIENT
Start: 2018-09-28 | End: 2018-09-28

## 2018-09-28 RX ORDER — NITROFURANTOIN 25; 75 MG/1; MG/1
100 CAPSULE ORAL 2 TIMES DAILY
Qty: 20 CAPSULE | Refills: 0 | Status: SHIPPED | OUTPATIENT
Start: 2018-09-28 | End: 2018-10-08

## 2018-09-28 RX ORDER — 0.9 % SODIUM CHLORIDE 0.9 %
500 INTRAVENOUS SOLUTION INTRAVENOUS ONCE
Status: COMPLETED | OUTPATIENT
Start: 2018-09-28 | End: 2018-09-28

## 2018-09-28 RX ORDER — MORPHINE SULFATE 4 MG/ML
4 INJECTION, SOLUTION INTRAMUSCULAR; INTRAVENOUS ONCE
Status: COMPLETED | OUTPATIENT
Start: 2018-09-28 | End: 2018-09-28

## 2018-09-28 RX ADMIN — ONDANSETRON 4 MG: 2 INJECTION INTRAMUSCULAR; INTRAVENOUS at 20:30

## 2018-09-28 RX ADMIN — MORPHINE SULFATE 4 MG: 4 INJECTION, SOLUTION INTRAMUSCULAR; INTRAVENOUS at 20:30

## 2018-09-28 RX ADMIN — SODIUM CHLORIDE 500 ML: 9 INJECTION, SOLUTION INTRAVENOUS at 20:25

## 2018-09-28 RX ADMIN — IOPAMIDOL 80 ML: 755 INJECTION, SOLUTION INTRAVENOUS at 22:05

## 2018-09-28 ASSESSMENT — PAIN DESCRIPTION - ORIENTATION: ORIENTATION: UPPER

## 2018-09-28 ASSESSMENT — PAIN SCALES - GENERAL
PAINLEVEL_OUTOF10: 10
PAINLEVEL_OUTOF10: 8
PAINLEVEL_OUTOF10: 8
PAINLEVEL_OUTOF10: 10

## 2018-09-28 ASSESSMENT — ENCOUNTER SYMPTOMS
NAUSEA: 1
VOMITING: 1
ABDOMINAL PAIN: 1
SHORTNESS OF BREATH: 0
SORE THROAT: 0
BLOOD IN STOOL: 0
DIARRHEA: 0
WHEEZING: 0
CONSTIPATION: 1
PHOTOPHOBIA: 0
BACK PAIN: 0
CHEST TIGHTNESS: 0
RHINORRHEA: 0
COUGH: 0

## 2018-09-28 ASSESSMENT — PAIN DESCRIPTION - DESCRIPTORS: DESCRIPTORS: SHARP

## 2018-09-28 ASSESSMENT — PAIN DESCRIPTION - LOCATION: LOCATION: ABDOMEN

## 2018-09-28 ASSESSMENT — PAIN DESCRIPTION - FREQUENCY: FREQUENCY: CONTINUOUS

## 2018-09-28 ASSESSMENT — PAIN DESCRIPTION - PAIN TYPE: TYPE: ACUTE PAIN

## 2018-09-28 ASSESSMENT — PAIN DESCRIPTION - PROGRESSION: CLINICAL_PROGRESSION: NOT CHANGED

## 2018-09-28 ASSESSMENT — PAIN DESCRIPTION - ONSET: ONSET: ON-GOING

## 2018-09-29 LAB
AMPHETAMINE+METHAMPHETAMINE URINE SCREEN: NEGATIVE
BARBITURATE QUANTITATIVE URINE: NEGATIVE
BENZODIAZEPINE QUANTITATIVE URINE: NEGATIVE
CANNABINOID QUANTITATIVE URINE: NEGATIVE
COCAINE METABOLITE QUANTITATIVE URINE: NEGATIVE
OPIATES, URINE: POSITIVE
OXYCODONE: NEGATIVE
PHENCYCLIDINE QUANTITATIVE URINE: NEGATIVE

## 2018-09-29 PROCEDURE — 93010 ELECTROCARDIOGRAM REPORT: CPT | Performed by: INTERNAL MEDICINE

## 2018-09-29 NOTE — ED PROVIDER NOTES
tracheal deviation present. Cardiovascular: Normal rate, regular rhythm, S1 normal, S2 normal, normal heart sounds, intact distal pulses and normal pulses. Exam reveals no gallop. No murmur heard. Pulmonary/Chest: Effort normal and breath sounds normal. No accessory muscle usage or stridor. No respiratory distress. She has no decreased breath sounds. She has no wheezes. She has no rhonchi. She has no rales. She exhibits no tenderness. Abdominal: Soft. Normal appearance and bowel sounds are normal. She exhibits no distension. There is generalized tenderness. There is no rigidity, no rebound, no guarding and no CVA tenderness. Generalized abdominal tenderness which is worse over her epigastrium. Musculoskeletal: Normal range of motion. Neurological: She is alert and oriented to person, place, and time. She displays no atrophy and no tremor. GCS eye subscore is 4. GCS verbal subscore is 5. GCS motor subscore is 6. Skin: Skin is warm and dry. No rash noted. Psychiatric: She has a normal mood and affect. Her speech is normal and behavior is normal.   Nursing note and vitals reviewed. DIFFERENTIAL DIAGNOSIS:   Pancreatitis, GERD, ACS, UTI, constipation, cholecystitis, cholelithiasis, gastritis    DIAGNOSTIC RESULTS     EKG: All EKG's are interpreted by the Emergency Department Physician who either signs or Co-signs this chart in the absence of a cardiologist.    Enrique Alejo. Rate: 84 bpm  MA interval: 136 ms  QRS duration: 82 ms  QTc: 425 ms  P-R-T axes: 64, 59, 55  Normal sinus rhythm with sinus arrhythmia  No STEMI  Compared to old EKG on April-    RADIOLOGY: non-plain film images(s) such as CT, Ultrasound and MRI are read by the radiologist.    CT ABDOMEN PELVIS W IV CONTRAST   Final Result   1. Unremarkable exam for acute appearing pathology. **This report has been created using voice recognition software. It may contain minor errors which are inherent in voice recognition technology. ** actions.     Melania Reyes PA-C 9/28/18 1:00 PM     Pantera Schwab PA-C  09/29/18 1300

## 2018-09-30 ENCOUNTER — NURSE TRIAGE (OUTPATIENT)
Dept: ADMINISTRATIVE | Age: 38
End: 2018-09-30

## 2018-10-01 LAB
ORGANISM: ABNORMAL
URINE CULTURE REFLEX: ABNORMAL

## 2018-10-04 ENCOUNTER — TELEPHONE (OUTPATIENT)
Dept: PHARMACY | Age: 38
End: 2018-10-04

## 2018-12-28 ENCOUNTER — HOSPITAL ENCOUNTER (EMERGENCY)
Age: 38
Discharge: HOME | End: 2018-12-28
Payer: COMMERCIAL

## 2018-12-28 VITALS
WEIGHT: 251 LBS | TEMPERATURE: 98.5 F | BODY MASS INDEX: 39.31 KG/M2 | DIASTOLIC BLOOD PRESSURE: 81 MMHG | RESPIRATION RATE: 20 BRPM | SYSTOLIC BLOOD PRESSURE: 118 MMHG | HEART RATE: 117 BPM | OXYGEN SATURATION: 98 %

## 2018-12-28 DIAGNOSIS — K52.9 GASTROENTERITIS: Primary | ICD-10-CM

## 2018-12-28 DIAGNOSIS — N30.01 ACUTE CYSTITIS WITH HEMATURIA: ICD-10-CM

## 2018-12-28 LAB
BILIRUBIN URINE: NEGATIVE
BLOOD, URINE: ABNORMAL
CHARACTER, URINE: CLEAR
COLOR: YELLOW
FLU A ANTIGEN: NEGATIVE
GLUCOSE, URINE: NEGATIVE MG/DL
INFLUENZA B AG, EIA: NEGATIVE
KETONES, URINE: NEGATIVE
LEUKOCYTES, UA: NEGATIVE
NITRATE, UA: POSITIVE
PH UA: 6 (ref 5–9)
PREGNANCY, URINE: NEGATIVE
PROTEIN UA: NEGATIVE MG/DL
REFLEX TO URINE C & S: ABNORMAL
SPECIFIC GRAVITY UA: 1.01 (ref 1–1.03)
UROBILINOGEN, URINE: 0.2 EU/DL (ref 0–1)

## 2018-12-28 PROCEDURE — 87086 URINE CULTURE/COLONY COUNT: CPT

## 2018-12-28 PROCEDURE — 81003 URINALYSIS AUTO W/O SCOPE: CPT

## 2018-12-28 PROCEDURE — 99213 OFFICE O/P EST LOW 20 MIN: CPT | Performed by: NURSE PRACTITIONER

## 2018-12-28 PROCEDURE — 81025 URINE PREGNANCY TEST: CPT

## 2018-12-28 PROCEDURE — 87184 SC STD DISK METHOD PER PLATE: CPT

## 2018-12-28 PROCEDURE — 87186 SC STD MICRODIL/AGAR DIL: CPT

## 2018-12-28 PROCEDURE — 99214 OFFICE O/P EST MOD 30 MIN: CPT

## 2018-12-28 PROCEDURE — 87804 INFLUENZA ASSAY W/OPTIC: CPT

## 2018-12-28 PROCEDURE — 87077 CULTURE AEROBIC IDENTIFY: CPT

## 2018-12-28 RX ORDER — ONDANSETRON 4 MG/1
4 TABLET, FILM COATED ORAL EVERY 8 HOURS PRN
Qty: 6 TABLET | Refills: 0 | Status: SHIPPED | OUTPATIENT
Start: 2018-12-28 | End: 2019-05-18 | Stop reason: ALTCHOICE

## 2018-12-28 RX ORDER — SULFAMETHOXAZOLE AND TRIMETHOPRIM 800; 160 MG/1; MG/1
1 TABLET ORAL 2 TIMES DAILY
Qty: 6 TABLET | Refills: 0 | Status: SHIPPED | OUTPATIENT
Start: 2018-12-28 | End: 2018-12-31

## 2018-12-28 RX ORDER — IBUPROFEN 200 MG
200 TABLET ORAL EVERY 6 HOURS PRN
COMMUNITY
End: 2019-04-18 | Stop reason: SDUPTHER

## 2018-12-28 ASSESSMENT — ENCOUNTER SYMPTOMS
SHORTNESS OF BREATH: 0
COUGH: 1
RHINORRHEA: 0
SORE THROAT: 0
CHEST TIGHTNESS: 0
SINUS PRESSURE: 0
NAUSEA: 1
EYE ITCHING: 0
TROUBLE SWALLOWING: 0
EYE REDNESS: 0
DIARRHEA: 1
VOMITING: 0
ABDOMINAL PAIN: 0

## 2018-12-28 ASSESSMENT — PAIN DESCRIPTION - FREQUENCY: FREQUENCY: CONTINUOUS

## 2018-12-28 ASSESSMENT — PAIN DESCRIPTION - ONSET: ONSET: SUDDEN

## 2018-12-28 ASSESSMENT — PAIN DESCRIPTION - DESCRIPTORS: DESCRIPTORS: ACHING

## 2018-12-28 ASSESSMENT — PAIN DESCRIPTION - PAIN TYPE: TYPE: ACUTE PAIN

## 2018-12-28 ASSESSMENT — PAIN SCALES - GENERAL: PAINLEVEL_OUTOF10: 9

## 2018-12-28 ASSESSMENT — PAIN DESCRIPTION - PROGRESSION: CLINICAL_PROGRESSION: GRADUALLY WORSENING

## 2018-12-28 ASSESSMENT — PAIN DESCRIPTION - LOCATION: LOCATION: GENERALIZED

## 2018-12-29 NOTE — ED TRIAGE NOTES
ambulatory back to exam room. Patient complains of body aches, lower back pain, nausea, diarrhea and cough. Symptoms began today after waking up. Respirations easy and unlabored. Condition stable. Waiting in room to be seen by medical provider.

## 2018-12-29 NOTE — ED PROVIDER NOTES
[facility] Encounter      [facility]       Chief Complaint   Patient presents with    Generalized Body Aches    Nausea    Diarrhea    Cough       Nurses Notes reviewed and I agree except as noted in the HPI. HISTORY OF PRESENT ILLNESS   Gissell Edmond is a 43 y.o. female who presents for evaluation of symptoms that started today after waking up. She think that she may have the flu as she has had body aches, some cough, some diarrhea, nausea, and urinary frequency. No fever/chills. REVIEW OF SYSTEMS     Review of Systems   Constitutional: Negative for chills, fatigue and fever. HENT: Negative for congestion, ear pain, postnasal drip, rhinorrhea, sinus pressure, sore throat and trouble swallowing. Eyes: Negative for redness and itching. Respiratory: Positive for cough. Negative for chest tightness and shortness of breath. Cardiovascular: Negative for chest pain. Gastrointestinal: Positive for diarrhea and nausea. Negative for abdominal pain and vomiting. Genitourinary: Positive for frequency. Negative for dysuria, hematuria and urgency. Musculoskeletal: Positive for arthralgias. Skin: Negative for rash. Allergic/Immunologic: Negative for environmental allergies and food allergies. Neurological: Negative for headaches. PAST MEDICAL HISTORY         Diagnosis Date    Allergic rhinitis     Anxiety     CAD (coronary artery disease)     Depression (emotion)     DUB (dysfunctional uterine bleeding)     Generalized anxiety disorder     GERD (gastroesophageal reflux disease)     Hypertriglyceridemia 11/11/2014    Hypoglycemia     Hypothyroid 8/10/2014    Movement disorder     Pancreatitis 11/10/14    Psychiatric problem     Scoliosis     Tobacco abuse        SURGICAL HISTORY     Patient  has a past surgical history that includes fracture surgery and knee surgery (08/2017).     CURRENT MEDICATIONS       Discharge Medication List as oriented to person, place, and time. Vital signs are normal. She appears well-developed and well-nourished. She is cooperative. HENT:   Head: Normocephalic and atraumatic. Right Ear: Hearing, tympanic membrane, external ear and ear canal normal.   Left Ear: Hearing, tympanic membrane, external ear and ear canal normal.   Nose: Nose normal. No mucosal edema or rhinorrhea. Right sinus exhibits no maxillary sinus tenderness and no frontal sinus tenderness. Left sinus exhibits no maxillary sinus tenderness and no frontal sinus tenderness. Mouth/Throat: Uvula is midline, oropharynx is clear and moist and mucous membranes are normal. No uvula swelling. No oropharyngeal exudate, posterior oropharyngeal edema, posterior oropharyngeal erythema or tonsillar abscesses. Eyes: Conjunctivae are normal.   Neck: Normal range of motion and full passive range of motion without pain. Cardiovascular: Normal rate and normal heart sounds. Pulmonary/Chest: Effort normal and breath sounds normal.   Abdominal: Normal appearance and bowel sounds are normal. There is no tenderness. There is no rigidity, no rebound, no guarding, no CVA tenderness, no tenderness at McBurney's point and negative Shaw's sign. Lymphadenopathy:     She has no cervical adenopathy. Neurological: She is alert and oriented to person, place, and time. Skin: Skin is warm and dry. No rash (on exposed surfaces) noted. Psychiatric: Her speech is normal. Her affect is blunt. Nursing note and vitals reviewed.       DIAGNOSTIC RESULTS   Labs:  Results for orders placed or performed during the hospital encounter of 12/28/18   Rapid influenza A/B antigens   Result Value Ref Range    Flu A Antigen NEGATIVE NEGATIVE    Influenza B Ag, EIA NEGATIVE NEGATIVE   UA without Microscopic Reflex C&S   Result Value Ref Range    Glucose, Urine Negative NEGATIVE mg/dl    Bilirubin Urine Negative NEGATIVE    Ketones, Urine Negative NEGATIVE    Specific Gravity, UA

## 2018-12-30 LAB
ORGANISM: ABNORMAL
URINE CULTURE REFLEX: ABNORMAL

## 2019-02-15 ENCOUNTER — HOSPITAL ENCOUNTER (EMERGENCY)
Age: 39
Discharge: HOME | End: 2019-02-15
Payer: COMMERCIAL

## 2019-02-15 ENCOUNTER — APPOINTMENT (OUTPATIENT)
Dept: CT IMAGING | Age: 39
End: 2019-02-15
Payer: COMMERCIAL

## 2019-02-15 ENCOUNTER — APPOINTMENT (OUTPATIENT)
Dept: GENERAL RADIOLOGY | Age: 39
End: 2019-02-15
Payer: COMMERCIAL

## 2019-02-15 VITALS
HEIGHT: 67 IN | BODY MASS INDEX: 39.24 KG/M2 | SYSTOLIC BLOOD PRESSURE: 130 MMHG | OXYGEN SATURATION: 98 % | RESPIRATION RATE: 17 BRPM | WEIGHT: 250 LBS | DIASTOLIC BLOOD PRESSURE: 78 MMHG | HEART RATE: 71 BPM | TEMPERATURE: 97.5 F

## 2019-02-15 DIAGNOSIS — R07.9 CHEST PAIN, UNSPECIFIED TYPE: Primary | ICD-10-CM

## 2019-02-15 DIAGNOSIS — R20.2 NUMBNESS AND TINGLING IN RIGHT HAND: ICD-10-CM

## 2019-02-15 DIAGNOSIS — R20.0 NUMBNESS AND TINGLING IN RIGHT HAND: ICD-10-CM

## 2019-02-15 DIAGNOSIS — F41.1 ANXIETY STATE: ICD-10-CM

## 2019-02-15 LAB
ANION GAP SERPL CALCULATED.3IONS-SCNC: 17 MEQ/L (ref 8–16)
BASOPHILS # BLD: 0.8 %
BASOPHILS ABSOLUTE: 0.1 THOU/MM3 (ref 0–0.1)
BUN BLDV-MCNC: 11 MG/DL (ref 7–22)
CALCIUM SERPL-MCNC: 9.2 MG/DL (ref 8.5–10.5)
CHLORIDE BLD-SCNC: 103 MEQ/L (ref 98–111)
CO2: 19 MEQ/L (ref 23–33)
CREAT SERPL-MCNC: 0.7 MG/DL (ref 0.4–1.2)
D-DIMER QUANTITATIVE: 269 NG/ML FEU (ref 0–500)
EKG ATRIAL RATE: 91 BPM
EKG P AXIS: 63 DEGREES
EKG P-R INTERVAL: 132 MS
EKG Q-T INTERVAL: 346 MS
EKG QRS DURATION: 80 MS
EKG QTC CALCULATION (BAZETT): 425 MS
EKG R AXIS: 62 DEGREES
EKG T AXIS: 52 DEGREES
EKG VENTRICULAR RATE: 91 BPM
EOSINOPHIL # BLD: 2.2 %
EOSINOPHILS ABSOLUTE: 0.2 THOU/MM3 (ref 0–0.4)
ERYTHROCYTE [DISTWIDTH] IN BLOOD BY AUTOMATED COUNT: 13.9 % (ref 11.5–14.5)
ERYTHROCYTE [DISTWIDTH] IN BLOOD BY AUTOMATED COUNT: 43.8 FL (ref 35–45)
GFR SERPL CREATININE-BSD FRML MDRD: > 90 ML/MIN/1.73M2
GLUCOSE BLD-MCNC: 106 MG/DL (ref 70–108)
HCT VFR BLD CALC: 43.2 % (ref 37–47)
HEMOGLOBIN: 13.8 GM/DL (ref 12–16)
IMMATURE GRANS (ABS): 0.01 THOU/MM3 (ref 0–0.07)
IMMATURE GRANULOCYTES: 0.1 %
LIPASE: 15.3 U/L (ref 5.6–51.3)
LYMPHOCYTES # BLD: 30.1 %
LYMPHOCYTES ABSOLUTE: 2.2 THOU/MM3 (ref 1–4.8)
MCH RBC QN AUTO: 27.7 PG (ref 26–33)
MCHC RBC AUTO-ENTMCNC: 31.9 GM/DL (ref 32.2–35.5)
MCV RBC AUTO: 86.7 FL (ref 81–99)
MONOCYTES # BLD: 5.9 %
MONOCYTES ABSOLUTE: 0.4 THOU/MM3 (ref 0.4–1.3)
NUCLEATED RED BLOOD CELLS: 0 /100 WBC
OSMOLALITY CALCULATION: 277.4 MOSMOL/KG (ref 275–300)
PLATELET # BLD: 353 THOU/MM3 (ref 130–400)
PMV BLD AUTO: 9.8 FL (ref 9.4–12.4)
POTASSIUM SERPL-SCNC: 4.1 MEQ/L (ref 3.5–5.2)
PRO-BNP: 52 PG/ML (ref 0–450)
RBC # BLD: 4.98 MILL/MM3 (ref 4.2–5.4)
SEG NEUTROPHILS: 60.9 %
SEGMENTED NEUTROPHILS ABSOLUTE COUNT: 4.5 THOU/MM3 (ref 1.8–7.7)
SODIUM BLD-SCNC: 139 MEQ/L (ref 135–145)
TROPONIN T: < 0.01 NG/ML
TROPONIN T: < 0.01 NG/ML
WBC # BLD: 7.4 THOU/MM3 (ref 4.8–10.8)

## 2019-02-15 PROCEDURE — 85379 FIBRIN DEGRADATION QUANT: CPT

## 2019-02-15 PROCEDURE — 83690 ASSAY OF LIPASE: CPT

## 2019-02-15 PROCEDURE — 6370000000 HC RX 637 (ALT 250 FOR IP): Performed by: STUDENT IN AN ORGANIZED HEALTH CARE EDUCATION/TRAINING PROGRAM

## 2019-02-15 PROCEDURE — 85025 COMPLETE CBC W/AUTO DIFF WBC: CPT

## 2019-02-15 PROCEDURE — 80048 BASIC METABOLIC PNL TOTAL CA: CPT

## 2019-02-15 PROCEDURE — 36415 COLL VENOUS BLD VENIPUNCTURE: CPT

## 2019-02-15 PROCEDURE — 99285 EMERGENCY DEPT VISIT HI MDM: CPT

## 2019-02-15 PROCEDURE — 72125 CT NECK SPINE W/O DYE: CPT

## 2019-02-15 PROCEDURE — 83880 ASSAY OF NATRIURETIC PEPTIDE: CPT

## 2019-02-15 PROCEDURE — 93010 ELECTROCARDIOGRAM REPORT: CPT | Performed by: INTERNAL MEDICINE

## 2019-02-15 PROCEDURE — 93005 ELECTROCARDIOGRAM TRACING: CPT | Performed by: FAMILY MEDICINE

## 2019-02-15 PROCEDURE — 71046 X-RAY EXAM CHEST 2 VIEWS: CPT

## 2019-02-15 PROCEDURE — 84484 ASSAY OF TROPONIN QUANT: CPT

## 2019-02-15 RX ORDER — ASPIRIN 81 MG/1
324 TABLET, CHEWABLE ORAL ONCE
Status: COMPLETED | OUTPATIENT
Start: 2019-02-15 | End: 2019-02-15

## 2019-02-15 RX ORDER — LORAZEPAM 1 MG/1
0.5 TABLET ORAL ONCE
Status: COMPLETED | OUTPATIENT
Start: 2019-02-15 | End: 2019-02-15

## 2019-02-15 RX ADMIN — ASPIRIN 81 MG 324 MG: 81 TABLET ORAL at 12:56

## 2019-02-15 RX ADMIN — LORAZEPAM 0.5 MG: 1 TABLET ORAL at 12:55

## 2019-02-15 ASSESSMENT — ENCOUNTER SYMPTOMS
PHOTOPHOBIA: 0
CHEST TIGHTNESS: 0
SHORTNESS OF BREATH: 0
ABDOMINAL PAIN: 0
ABDOMINAL DISTENTION: 0
CONSTIPATION: 0
COLOR CHANGE: 0
DIARRHEA: 0
NAUSEA: 0
BACK PAIN: 0
COUGH: 0
VOMITING: 0
SINUS PAIN: 0
RHINORRHEA: 0
WHEEZING: 0

## 2019-02-15 ASSESSMENT — PAIN DESCRIPTION - ONSET: ONSET: ON-GOING

## 2019-02-15 ASSESSMENT — PAIN SCALES - GENERAL
PAINLEVEL_OUTOF10: 0
PAINLEVEL_OUTOF10: 8

## 2019-02-15 ASSESSMENT — PAIN DESCRIPTION - PAIN TYPE: TYPE: ACUTE PAIN

## 2019-02-15 ASSESSMENT — PAIN DESCRIPTION - PROGRESSION: CLINICAL_PROGRESSION: NOT CHANGED

## 2019-02-15 ASSESSMENT — PAIN DESCRIPTION - FREQUENCY: FREQUENCY: CONTINUOUS

## 2019-02-15 ASSESSMENT — PAIN DESCRIPTION - DESCRIPTORS: DESCRIPTORS: CONSTANT;PRESSURE

## 2019-02-15 ASSESSMENT — PAIN DESCRIPTION - LOCATION: LOCATION: CHEST

## 2019-02-15 ASSESSMENT — PAIN DESCRIPTION - ORIENTATION: ORIENTATION: MID

## 2019-02-15 ASSESSMENT — HEART SCORE: ECG: 0

## 2019-02-19 ENCOUNTER — OFFICE VISIT (OUTPATIENT)
Dept: CARDIOLOGY CLINIC | Age: 39
End: 2019-02-19
Payer: COMMERCIAL

## 2019-02-19 VITALS
HEART RATE: 87 BPM | SYSTOLIC BLOOD PRESSURE: 125 MMHG | HEIGHT: 67 IN | WEIGHT: 255.2 LBS | BODY MASS INDEX: 40.06 KG/M2 | DIASTOLIC BLOOD PRESSURE: 81 MMHG

## 2019-02-19 DIAGNOSIS — R00.2 INTERMITTENT PALPITATIONS: ICD-10-CM

## 2019-02-19 DIAGNOSIS — R07.89 CHEST PAIN, ATYPICAL: Primary | ICD-10-CM

## 2019-02-19 DIAGNOSIS — R06.02 SOB (SHORTNESS OF BREATH): ICD-10-CM

## 2019-02-19 DIAGNOSIS — Z72.0 TOBACCO ABUSE: ICD-10-CM

## 2019-02-19 PROCEDURE — 99204 OFFICE O/P NEW MOD 45 MIN: CPT | Performed by: INTERNAL MEDICINE

## 2019-02-19 PROCEDURE — G8484 FLU IMMUNIZE NO ADMIN: HCPCS | Performed by: INTERNAL MEDICINE

## 2019-02-19 PROCEDURE — G8428 CUR MEDS NOT DOCUMENT: HCPCS | Performed by: INTERNAL MEDICINE

## 2019-02-19 PROCEDURE — G8417 CALC BMI ABV UP PARAM F/U: HCPCS | Performed by: INTERNAL MEDICINE

## 2019-02-19 PROCEDURE — 4004F PT TOBACCO SCREEN RCVD TLK: CPT | Performed by: INTERNAL MEDICINE

## 2019-02-19 RX ORDER — FLUTICASONE PROPIONATE 50 MCG
1 SPRAY, SUSPENSION (ML) NASAL DAILY
COMMUNITY
End: 2022-08-24 | Stop reason: ALTCHOICE

## 2019-03-07 ENCOUNTER — TELEPHONE (OUTPATIENT)
Dept: CARDIOLOGY CLINIC | Age: 39
End: 2019-03-07

## 2019-03-07 DIAGNOSIS — R06.02 SOB (SHORTNESS OF BREATH): ICD-10-CM

## 2019-03-07 DIAGNOSIS — R07.89 CHEST PAIN, ATYPICAL: Primary | ICD-10-CM

## 2019-03-08 ENCOUNTER — HOSPITAL ENCOUNTER (EMERGENCY)
Age: 39
Discharge: HOME | End: 2019-03-08
Payer: COMMERCIAL

## 2019-03-08 ENCOUNTER — OFFICE VISIT (OUTPATIENT)
Dept: PSYCHIATRY | Age: 39
End: 2019-03-08
Payer: COMMERCIAL

## 2019-03-08 VITALS
HEART RATE: 96 BPM | WEIGHT: 240 LBS | DIASTOLIC BLOOD PRESSURE: 79 MMHG | OXYGEN SATURATION: 98 % | BODY MASS INDEX: 37.67 KG/M2 | SYSTOLIC BLOOD PRESSURE: 137 MMHG | TEMPERATURE: 97.1 F | HEIGHT: 67 IN | RESPIRATION RATE: 18 BRPM

## 2019-03-08 DIAGNOSIS — J20.9 ACUTE BRONCHITIS WITH BRONCHOSPASM: Primary | ICD-10-CM

## 2019-03-08 DIAGNOSIS — F43.10 PTSD (POST-TRAUMATIC STRESS DISORDER): ICD-10-CM

## 2019-03-08 DIAGNOSIS — Z72.0 TOBACCO ABUSE: ICD-10-CM

## 2019-03-08 DIAGNOSIS — F41.0 PANIC DISORDER: Primary | ICD-10-CM

## 2019-03-08 PROCEDURE — 90792 PSYCH DIAG EVAL W/MED SRVCS: CPT | Performed by: PSYCHIATRY & NEUROLOGY

## 2019-03-08 PROCEDURE — 2709999900 HC NON-CHARGEABLE SUPPLY

## 2019-03-08 PROCEDURE — 99214 OFFICE O/P EST MOD 30 MIN: CPT | Performed by: NURSE PRACTITIONER

## 2019-03-08 PROCEDURE — 96372 THER/PROPH/DIAG INJ SC/IM: CPT

## 2019-03-08 PROCEDURE — 4004F PT TOBACCO SCREEN RCVD TLK: CPT | Performed by: PSYCHIATRY & NEUROLOGY

## 2019-03-08 PROCEDURE — 99212 OFFICE O/P EST SF 10 MIN: CPT

## 2019-03-08 PROCEDURE — 6370000000 HC RX 637 (ALT 250 FOR IP): Performed by: NURSE PRACTITIONER

## 2019-03-08 PROCEDURE — 6360000002 HC RX W HCPCS: Performed by: NURSE PRACTITIONER

## 2019-03-08 PROCEDURE — 94640 AIRWAY INHALATION TREATMENT: CPT

## 2019-03-08 RX ORDER — ALBUTEROL SULFATE 90 UG/1
2 AEROSOL, METERED RESPIRATORY (INHALATION) EVERY 4 HOURS PRN
Qty: 1 INHALER | Refills: 0 | Status: SHIPPED | OUTPATIENT
Start: 2019-03-08 | End: 2019-08-29 | Stop reason: SDUPTHER

## 2019-03-08 RX ORDER — METHYLPREDNISOLONE SODIUM SUCCINATE 125 MG/2ML
125 INJECTION, POWDER, LYOPHILIZED, FOR SOLUTION INTRAMUSCULAR; INTRAVENOUS ONCE
Status: COMPLETED | OUTPATIENT
Start: 2019-03-08 | End: 2019-03-08

## 2019-03-08 RX ORDER — DOXYCYCLINE HYCLATE 100 MG/1
100 CAPSULE ORAL 2 TIMES DAILY
Qty: 14 CAPSULE | Refills: 0 | Status: SHIPPED | OUTPATIENT
Start: 2019-03-08 | End: 2019-03-15

## 2019-03-08 RX ORDER — CLONAZEPAM 0.5 MG/1
0.5 TABLET ORAL 2 TIMES DAILY PRN
Qty: 30 TABLET | Refills: 0 | Status: SHIPPED | OUTPATIENT
Start: 2019-03-08 | End: 2019-03-27

## 2019-03-08 RX ORDER — PREDNISONE 20 MG/1
20 TABLET ORAL 2 TIMES DAILY
Qty: 10 TABLET | Refills: 0 | Status: SHIPPED | OUTPATIENT
Start: 2019-03-08 | End: 2019-03-13

## 2019-03-08 RX ORDER — IPRATROPIUM BROMIDE AND ALBUTEROL SULFATE 2.5; .5 MG/3ML; MG/3ML
1 SOLUTION RESPIRATORY (INHALATION) ONCE
Status: COMPLETED | OUTPATIENT
Start: 2019-03-08 | End: 2019-03-08

## 2019-03-08 RX ORDER — DEXTROMETHORPHAN HYDROBROMIDE AND PROMETHAZINE HYDROCHLORIDE 15; 6.25 MG/5ML; MG/5ML
5 SYRUP ORAL NIGHTLY PRN
Qty: 35 ML | Refills: 0 | Status: SHIPPED | OUTPATIENT
Start: 2019-03-08 | End: 2019-03-15

## 2019-03-08 RX ORDER — DULOXETIN HYDROCHLORIDE 20 MG/1
20 CAPSULE, DELAYED RELEASE ORAL DAILY
Qty: 30 CAPSULE | Refills: 1 | Status: SHIPPED | OUTPATIENT
Start: 2019-03-08 | End: 2019-04-10

## 2019-03-08 RX ADMIN — METHYLPREDNISOLONE SODIUM SUCCINATE 125 MG: 125 INJECTION, POWDER, FOR SOLUTION INTRAMUSCULAR; INTRAVENOUS at 10:08

## 2019-03-08 RX ADMIN — IPRATROPIUM BROMIDE AND ALBUTEROL SULFATE 1 AMPULE: .5; 3 SOLUTION RESPIRATORY (INHALATION) at 10:08

## 2019-03-08 ASSESSMENT — ENCOUNTER SYMPTOMS
TROUBLE SWALLOWING: 0
CHEST TIGHTNESS: 1
SINUS PRESSURE: 0
SORE THROAT: 0
VOMITING: 1
EYE DISCHARGE: 0
SHORTNESS OF BREATH: 1
NAUSEA: 0
BACK PAIN: 0
SINUS PAIN: 0
RHINORRHEA: 1
SINUS CONGESTION: 1
WHEEZING: 1
FACIAL SWELLING: 0
ABDOMINAL PAIN: 0
VOICE CHANGE: 0
COUGH: 1

## 2019-03-08 ASSESSMENT — PAIN SCALES - GENERAL: PAINLEVEL_OUTOF10: 8

## 2019-03-08 ASSESSMENT — PAIN DESCRIPTION - LOCATION: LOCATION: THROAT

## 2019-03-08 ASSESSMENT — PAIN DESCRIPTION - DESCRIPTORS: DESCRIPTORS: ACHING

## 2019-03-08 ASSESSMENT — PAIN DESCRIPTION - PAIN TYPE: TYPE: ACUTE PAIN

## 2019-03-10 ENCOUNTER — HOSPITAL ENCOUNTER (EMERGENCY)
Age: 39
Discharge: HOME | End: 2019-03-10
Payer: COMMERCIAL

## 2019-03-10 ENCOUNTER — APPOINTMENT (OUTPATIENT)
Dept: GENERAL RADIOLOGY | Age: 39
End: 2019-03-10
Payer: COMMERCIAL

## 2019-03-10 ENCOUNTER — NURSE TRIAGE (OUTPATIENT)
Dept: ADMINISTRATIVE | Age: 39
End: 2019-03-10

## 2019-03-10 VITALS
BODY MASS INDEX: 37.67 KG/M2 | DIASTOLIC BLOOD PRESSURE: 86 MMHG | RESPIRATION RATE: 16 BRPM | OXYGEN SATURATION: 96 % | TEMPERATURE: 98 F | HEART RATE: 94 BPM | WEIGHT: 240 LBS | HEIGHT: 67 IN | SYSTOLIC BLOOD PRESSURE: 142 MMHG

## 2019-03-10 DIAGNOSIS — J06.9 VIRAL URI WITH COUGH: Primary | ICD-10-CM

## 2019-03-10 LAB
ANION GAP SERPL CALCULATED.3IONS-SCNC: 16 MEQ/L (ref 8–16)
BASOPHILS # BLD: 0.2 %
BASOPHILS ABSOLUTE: 0 THOU/MM3 (ref 0–0.1)
BUN BLDV-MCNC: 13 MG/DL (ref 7–22)
CALCIUM SERPL-MCNC: 9 MG/DL (ref 8.5–10.5)
CHLORIDE BLD-SCNC: 100 MEQ/L (ref 98–111)
CO2: 23 MEQ/L (ref 23–33)
CREAT SERPL-MCNC: 0.8 MG/DL (ref 0.4–1.2)
D-DIMER QUANTITATIVE: 415 NG/ML FEU (ref 0–500)
EKG ATRIAL RATE: 73 BPM
EKG P AXIS: 71 DEGREES
EKG P-R INTERVAL: 124 MS
EKG Q-T INTERVAL: 404 MS
EKG QRS DURATION: 84 MS
EKG QTC CALCULATION (BAZETT): 445 MS
EKG R AXIS: 71 DEGREES
EKG T AXIS: 64 DEGREES
EKG VENTRICULAR RATE: 73 BPM
EOSINOPHIL # BLD: 0 %
EOSINOPHILS ABSOLUTE: 0 THOU/MM3 (ref 0–0.4)
ERYTHROCYTE [DISTWIDTH] IN BLOOD BY AUTOMATED COUNT: 14.6 % (ref 11.5–14.5)
ERYTHROCYTE [DISTWIDTH] IN BLOOD BY AUTOMATED COUNT: 46.5 FL (ref 35–45)
FLU A ANTIGEN: NEGATIVE
FLU B ANTIGEN: NEGATIVE
GFR SERPL CREATININE-BSD FRML MDRD: 79 ML/MIN/1.73M2
GLUCOSE BLD-MCNC: 130 MG/DL (ref 70–108)
GROUP A STREP CULTURE, REFLEX: NEGATIVE
HCT VFR BLD CALC: 41 % (ref 37–47)
HEMOGLOBIN: 12.8 GM/DL (ref 12–16)
IMMATURE GRANS (ABS): 0.03 THOU/MM3 (ref 0–0.07)
IMMATURE GRANULOCYTES: 0.3 %
LYMPHOCYTES # BLD: 6.3 %
LYMPHOCYTES ABSOLUTE: 0.6 THOU/MM3 (ref 1–4.8)
MCH RBC QN AUTO: 27.2 PG (ref 26–33)
MCHC RBC AUTO-ENTMCNC: 31.2 GM/DL (ref 32.2–35.5)
MCV RBC AUTO: 87.2 FL (ref 81–99)
MONOCYTES # BLD: 0.7 %
MONOCYTES ABSOLUTE: 0.1 THOU/MM3 (ref 0.4–1.3)
NUCLEATED RED BLOOD CELLS: 0 /100 WBC
OSMOLALITY CALCULATION: 279.4 MOSMOL/KG (ref 275–300)
PLATELET # BLD: 350 THOU/MM3 (ref 130–400)
PMV BLD AUTO: 9.4 FL (ref 9.4–12.4)
POTASSIUM SERPL-SCNC: 4.6 MEQ/L (ref 3.5–5.2)
RBC # BLD: 4.7 MILL/MM3 (ref 4.2–5.4)
REFLEX THROAT C + S: NORMAL
SEG NEUTROPHILS: 92.5 %
SEGMENTED NEUTROPHILS ABSOLUTE COUNT: 9.4 THOU/MM3 (ref 1.8–7.7)
SODIUM BLD-SCNC: 139 MEQ/L (ref 135–145)
TROPONIN T: < 0.01 NG/ML
WBC # BLD: 10.2 THOU/MM3 (ref 4.8–10.8)

## 2019-03-10 PROCEDURE — 96372 THER/PROPH/DIAG INJ SC/IM: CPT

## 2019-03-10 PROCEDURE — 80048 BASIC METABOLIC PNL TOTAL CA: CPT

## 2019-03-10 PROCEDURE — 71046 X-RAY EXAM CHEST 2 VIEWS: CPT

## 2019-03-10 PROCEDURE — 36415 COLL VENOUS BLD VENIPUNCTURE: CPT

## 2019-03-10 PROCEDURE — 2709999900 HC NON-CHARGEABLE SUPPLY

## 2019-03-10 PROCEDURE — 99285 EMERGENCY DEPT VISIT HI MDM: CPT

## 2019-03-10 PROCEDURE — 85379 FIBRIN DEGRADATION QUANT: CPT

## 2019-03-10 PROCEDURE — 84484 ASSAY OF TROPONIN QUANT: CPT

## 2019-03-10 PROCEDURE — 85025 COMPLETE CBC W/AUTO DIFF WBC: CPT

## 2019-03-10 PROCEDURE — 6360000002 HC RX W HCPCS: Performed by: NURSE PRACTITIONER

## 2019-03-10 PROCEDURE — 87880 STREP A ASSAY W/OPTIC: CPT

## 2019-03-10 PROCEDURE — 6370000000 HC RX 637 (ALT 250 FOR IP): Performed by: NURSE PRACTITIONER

## 2019-03-10 PROCEDURE — 87070 CULTURE OTHR SPECIMN AEROBIC: CPT

## 2019-03-10 PROCEDURE — 93005 ELECTROCARDIOGRAM TRACING: CPT | Performed by: NURSE PRACTITIONER

## 2019-03-10 PROCEDURE — 87804 INFLUENZA ASSAY W/OPTIC: CPT

## 2019-03-10 PROCEDURE — 94640 AIRWAY INHALATION TREATMENT: CPT

## 2019-03-10 RX ORDER — IPRATROPIUM BROMIDE AND ALBUTEROL SULFATE 2.5; .5 MG/3ML; MG/3ML
1 SOLUTION RESPIRATORY (INHALATION) ONCE
Status: COMPLETED | OUTPATIENT
Start: 2019-03-10 | End: 2019-03-10

## 2019-03-10 RX ORDER — METHYLPREDNISOLONE SODIUM SUCCINATE 125 MG/2ML
80 INJECTION, POWDER, LYOPHILIZED, FOR SOLUTION INTRAMUSCULAR; INTRAVENOUS ONCE
Status: COMPLETED | OUTPATIENT
Start: 2019-03-10 | End: 2019-03-10

## 2019-03-10 RX ORDER — GUAIFENESIN AND DEXTROMETHORPHAN HYDROBROMIDE 600; 30 MG/1; MG/1
1 TABLET, EXTENDED RELEASE ORAL 2 TIMES DAILY
Qty: 28 TABLET | Refills: 0 | Status: SHIPPED | OUTPATIENT
Start: 2019-03-10 | End: 2019-07-18

## 2019-03-10 RX ADMIN — IPRATROPIUM BROMIDE AND ALBUTEROL SULFATE 1 AMPULE: .5; 3 SOLUTION RESPIRATORY (INHALATION) at 20:03

## 2019-03-10 RX ADMIN — METHYLPREDNISOLONE SODIUM SUCCINATE 80 MG: 125 INJECTION, POWDER, FOR SOLUTION INTRAMUSCULAR; INTRAVENOUS at 19:35

## 2019-03-10 ASSESSMENT — ENCOUNTER SYMPTOMS
EYE PAIN: 0
VOMITING: 0
ABDOMINAL PAIN: 0
EYE DISCHARGE: 0
BACK PAIN: 0
DIARRHEA: 0
SHORTNESS OF BREATH: 1
COUGH: 1
WHEEZING: 1
SORE THROAT: 1
NAUSEA: 1
RHINORRHEA: 0

## 2019-03-11 PROCEDURE — 93010 ELECTROCARDIOGRAM REPORT: CPT | Performed by: INTERNAL MEDICINE

## 2019-03-12 LAB — THROAT/NOSE CULTURE: NORMAL

## 2019-03-18 ENCOUNTER — TELEPHONE (OUTPATIENT)
Dept: PSYCHIATRY | Age: 39
End: 2019-03-18

## 2019-03-27 ENCOUNTER — OFFICE VISIT (OUTPATIENT)
Dept: PSYCHIATRY | Age: 39
End: 2019-03-27
Payer: COMMERCIAL

## 2019-03-27 DIAGNOSIS — F43.10 PTSD (POST-TRAUMATIC STRESS DISORDER): ICD-10-CM

## 2019-03-27 DIAGNOSIS — F32.A DEPRESSION, UNSPECIFIED DEPRESSION TYPE: Primary | ICD-10-CM

## 2019-03-27 DIAGNOSIS — F41.0 PANIC DISORDER: ICD-10-CM

## 2019-03-27 PROCEDURE — 4004F PT TOBACCO SCREEN RCVD TLK: CPT | Performed by: PSYCHIATRY & NEUROLOGY

## 2019-03-27 PROCEDURE — G8484 FLU IMMUNIZE NO ADMIN: HCPCS | Performed by: PSYCHIATRY & NEUROLOGY

## 2019-03-27 PROCEDURE — 99214 OFFICE O/P EST MOD 30 MIN: CPT | Performed by: PSYCHIATRY & NEUROLOGY

## 2019-03-27 PROCEDURE — G8428 CUR MEDS NOT DOCUMENT: HCPCS | Performed by: PSYCHIATRY & NEUROLOGY

## 2019-03-27 PROCEDURE — G8417 CALC BMI ABV UP PARAM F/U: HCPCS | Performed by: PSYCHIATRY & NEUROLOGY

## 2019-03-27 RX ORDER — CLONAZEPAM 0.5 MG/1
0.5 TABLET ORAL 2 TIMES DAILY PRN
Qty: 60 TABLET | Refills: 0 | Status: SHIPPED | OUTPATIENT
Start: 2019-03-27 | End: 2019-03-27 | Stop reason: SDUPTHER

## 2019-03-27 RX ORDER — CLONAZEPAM 0.5 MG/1
0.5 TABLET ORAL 2 TIMES DAILY PRN
Qty: 60 TABLET | Refills: 0 | Status: SHIPPED | OUTPATIENT
Start: 2019-03-27 | End: 2019-04-10

## 2019-03-27 RX ORDER — PRAZOSIN HYDROCHLORIDE 1 MG/1
3 CAPSULE ORAL NIGHTLY
Qty: 30 CAPSULE | Refills: 1 | Status: SHIPPED | OUTPATIENT
Start: 2019-03-27 | End: 2019-03-27 | Stop reason: SDUPTHER

## 2019-03-27 RX ORDER — PRAZOSIN HYDROCHLORIDE 1 MG/1
3 CAPSULE ORAL NIGHTLY
Qty: 30 CAPSULE | Refills: 1 | Status: SHIPPED | OUTPATIENT
Start: 2019-03-27 | End: 2019-04-26 | Stop reason: SDUPTHER

## 2019-04-06 ENCOUNTER — TELEPHONE (OUTPATIENT)
Dept: PSYCHIATRY | Age: 39
End: 2019-04-06

## 2019-04-10 ENCOUNTER — OFFICE VISIT (OUTPATIENT)
Dept: PSYCHIATRY | Age: 39
End: 2019-04-10
Payer: COMMERCIAL

## 2019-04-10 DIAGNOSIS — F33.1 MODERATE EPISODE OF RECURRENT MAJOR DEPRESSIVE DISORDER (HCC): Primary | ICD-10-CM

## 2019-04-10 DIAGNOSIS — F41.0 PANIC DISORDER: ICD-10-CM

## 2019-04-10 DIAGNOSIS — F43.10 PTSD (POST-TRAUMATIC STRESS DISORDER): ICD-10-CM

## 2019-04-10 PROCEDURE — 99214 OFFICE O/P EST MOD 30 MIN: CPT | Performed by: PSYCHIATRY & NEUROLOGY

## 2019-04-10 PROCEDURE — 90833 PSYTX W PT W E/M 30 MIN: CPT | Performed by: PSYCHIATRY & NEUROLOGY

## 2019-04-10 PROCEDURE — 4004F PT TOBACCO SCREEN RCVD TLK: CPT | Performed by: PSYCHIATRY & NEUROLOGY

## 2019-04-10 PROCEDURE — G8427 DOCREV CUR MEDS BY ELIG CLIN: HCPCS | Performed by: PSYCHIATRY & NEUROLOGY

## 2019-04-10 PROCEDURE — G8417 CALC BMI ABV UP PARAM F/U: HCPCS | Performed by: PSYCHIATRY & NEUROLOGY

## 2019-04-10 RX ORDER — DULOXETINE 40 MG/1
40 CAPSULE, DELAYED RELEASE ORAL DAILY
Qty: 30 CAPSULE | Refills: 1 | Status: SHIPPED | OUTPATIENT
Start: 2019-04-10 | End: 2019-06-13 | Stop reason: SDUPTHER

## 2019-04-10 RX ORDER — CLONAZEPAM 0.5 MG/1
0.5 TABLET ORAL 2 TIMES DAILY PRN
Qty: 60 TABLET | Refills: 1 | Status: SHIPPED | OUTPATIENT
Start: 2019-04-10 | End: 2019-05-21

## 2019-04-10 NOTE — PROGRESS NOTES
500 MG tablet Take 1 tablet by mouth every 6 hours as needed for Pain 120 tablet 3    levothyroxine (SYNTHROID) 25 MCG tablet Take 25 mcg by mouth Daily. No current facility-administered medications for this visit. Mental Status Exam:   Appearance: Neatly groomed, appropriately dressed   Gait:normal  Behavior: Cooperative and Appropriate   Eye Contact: Maintained good eye contact   Psychomotor Activity: Normal   Speech: somewhat dysarthric due to new dentures   Mood: Gloomy   Affect: Full range, appropriate, and mood congruent   Thought Process: Goal directed and coherent   Associations: Goal directed and coherent   Thought content: The thought content was appropriate to questions. The patient denies any suicidal ideation, homicidal ideation, or paranoid ideation. There are no signs of psychosis or symptoms of mood disorder   Perceptions: Perceptions were normal, the patient denied any auditory, tactile or visual hallucinations   Cognition: Patient is alert and oriented to time, place and person, no gross cognitive deficits   Memory:grossly intact  Attention:ok  Judgment: Judgment appeared normal and appropri ate   Insight: Good insight into illness   Impulse control: Intact     Assessment:   Nightmares improved  Anxiety and mood no improvement   Diagnosis:   1. Moderate episode of recurrent major depressive disorder ([facility] [address]    2. PTSD (post-traumatic stress disorder)    3.  Panic disorder        Plan:   Increase duloxetine to 40 mg  Continue prazosin 3 mg  Return x 6 weeks

## 2019-04-18 ENCOUNTER — HOSPITAL ENCOUNTER (EMERGENCY)
Age: 39
Discharge: HOME | End: 2019-04-18
Attending: EMERGENCY MEDICINE
Payer: COMMERCIAL

## 2019-04-18 ENCOUNTER — APPOINTMENT (OUTPATIENT)
Dept: CT IMAGING | Age: 39
End: 2019-04-18
Payer: COMMERCIAL

## 2019-04-18 VITALS
TEMPERATURE: 98 F | OXYGEN SATURATION: 98 % | BODY MASS INDEX: 37.59 KG/M2 | HEART RATE: 78 BPM | RESPIRATION RATE: 16 BRPM | WEIGHT: 240 LBS | DIASTOLIC BLOOD PRESSURE: 80 MMHG | SYSTOLIC BLOOD PRESSURE: 117 MMHG

## 2019-04-18 DIAGNOSIS — M43.6 ACUTE TORTICOLLIS: Primary | ICD-10-CM

## 2019-04-18 LAB
ANION GAP SERPL CALCULATED.3IONS-SCNC: 11 MEQ/L (ref 8–16)
BASOPHILS # BLD: 0.6 %
BASOPHILS ABSOLUTE: 0 THOU/MM3 (ref 0–0.1)
BUN BLDV-MCNC: 10 MG/DL (ref 7–22)
C-REACTIVE PROTEIN: 0.31 MG/DL (ref 0–1)
CALCIUM SERPL-MCNC: 8.9 MG/DL (ref 8.5–10.5)
CHLORIDE BLD-SCNC: 104 MEQ/L (ref 98–111)
CO2: 25 MEQ/L (ref 23–33)
CREAT SERPL-MCNC: 0.8 MG/DL (ref 0.4–1.2)
EOSINOPHIL # BLD: 1.4 %
EOSINOPHILS ABSOLUTE: 0.1 THOU/MM3 (ref 0–0.4)
ERYTHROCYTE [DISTWIDTH] IN BLOOD BY AUTOMATED COUNT: 14.1 % (ref 11.5–14.5)
ERYTHROCYTE [DISTWIDTH] IN BLOOD BY AUTOMATED COUNT: 44.5 FL (ref 35–45)
GFR SERPL CREATININE-BSD FRML MDRD: 79 ML/MIN/1.73M2
GLUCOSE BLD-MCNC: 118 MG/DL (ref 70–108)
GROUP A STREP CULTURE, REFLEX: NEGATIVE
HCT VFR BLD CALC: 37.9 % (ref 37–47)
HEMOGLOBIN: 12.1 GM/DL (ref 12–16)
IMMATURE GRANS (ABS): 0.01 THOU/MM3 (ref 0–0.07)
IMMATURE GRANULOCYTES: 0.1 %
LYMPHOCYTES # BLD: 32 %
LYMPHOCYTES ABSOLUTE: 2.2 THOU/MM3 (ref 1–4.8)
MCH RBC QN AUTO: 27.5 PG (ref 26–33)
MCHC RBC AUTO-ENTMCNC: 31.9 GM/DL (ref 32.2–35.5)
MCV RBC AUTO: 86.1 FL (ref 81–99)
MONOCYTES # BLD: 5 %
MONOCYTES ABSOLUTE: 0.4 THOU/MM3 (ref 0.4–1.3)
NUCLEATED RED BLOOD CELLS: 0 /100 WBC
OSMOLALITY CALCULATION: 279.5 MOSMOL/KG (ref 275–300)
PLATELET # BLD: 335 THOU/MM3 (ref 130–400)
PMV BLD AUTO: 9.8 FL (ref 9.4–12.4)
POTASSIUM SERPL-SCNC: 4.7 MEQ/L (ref 3.5–5.2)
PREGNANCY, SERUM: NEGATIVE
RBC # BLD: 4.4 MILL/MM3 (ref 4.2–5.4)
REFLEX THROAT C + S: NORMAL
SEDIMENTATION RATE, ERYTHROCYTE: 9 MM/HR (ref 0–20)
SEG NEUTROPHILS: 60.9 %
SEGMENTED NEUTROPHILS ABSOLUTE COUNT: 4.3 THOU/MM3 (ref 1.8–7.7)
SODIUM BLD-SCNC: 140 MEQ/L (ref 135–145)
TOTAL CK: 118 U/L (ref 30–135)
WBC # BLD: 7 THOU/MM3 (ref 4.8–10.8)

## 2019-04-18 PROCEDURE — 80048 BASIC METABOLIC PNL TOTAL CA: CPT

## 2019-04-18 PROCEDURE — 6370000000 HC RX 637 (ALT 250 FOR IP): Performed by: EMERGENCY MEDICINE

## 2019-04-18 PROCEDURE — 86140 C-REACTIVE PROTEIN: CPT

## 2019-04-18 PROCEDURE — 99284 EMERGENCY DEPT VISIT MOD MDM: CPT

## 2019-04-18 PROCEDURE — 36415 COLL VENOUS BLD VENIPUNCTURE: CPT

## 2019-04-18 PROCEDURE — 82550 ASSAY OF CK (CPK): CPT

## 2019-04-18 PROCEDURE — 96374 THER/PROPH/DIAG INJ IV PUSH: CPT

## 2019-04-18 PROCEDURE — 6360000002 HC RX W HCPCS: Performed by: EMERGENCY MEDICINE

## 2019-04-18 PROCEDURE — 84703 CHORIONIC GONADOTROPIN ASSAY: CPT

## 2019-04-18 PROCEDURE — 87070 CULTURE OTHR SPECIMN AEROBIC: CPT

## 2019-04-18 PROCEDURE — 87880 STREP A ASSAY W/OPTIC: CPT

## 2019-04-18 PROCEDURE — 85025 COMPLETE CBC W/AUTO DIFF WBC: CPT

## 2019-04-18 PROCEDURE — 85651 RBC SED RATE NONAUTOMATED: CPT

## 2019-04-18 RX ORDER — DIAZEPAM 5 MG/1
5 TABLET ORAL ONCE
Status: COMPLETED | OUTPATIENT
Start: 2019-04-18 | End: 2019-04-18

## 2019-04-18 RX ORDER — LIDOCAINE 50 MG/G
1 PATCH TOPICAL DAILY
Qty: 15 PATCH | Refills: 0 | Status: SHIPPED | OUTPATIENT
Start: 2019-04-18 | End: 2019-07-18

## 2019-04-18 RX ORDER — KETOROLAC TROMETHAMINE 30 MG/ML
30 INJECTION, SOLUTION INTRAMUSCULAR; INTRAVENOUS ONCE
Status: COMPLETED | OUTPATIENT
Start: 2019-04-18 | End: 2019-04-18

## 2019-04-18 RX ORDER — CYCLOBENZAPRINE HCL 5 MG
5 TABLET ORAL NIGHTLY PRN
Qty: 30 TABLET | Refills: 0 | Status: SHIPPED | OUTPATIENT
Start: 2019-04-18 | End: 2019-04-28

## 2019-04-18 RX ORDER — NAPROXEN 500 MG/1
500 TABLET ORAL 2 TIMES DAILY PRN
Qty: 30 TABLET | Refills: 0 | Status: SHIPPED | OUTPATIENT
Start: 2019-04-18 | End: 2019-05-18 | Stop reason: ALTCHOICE

## 2019-04-18 RX ADMIN — KETOROLAC TROMETHAMINE 30 MG: 30 INJECTION, SOLUTION INTRAMUSCULAR at 14:56

## 2019-04-18 RX ADMIN — DIAZEPAM 5 MG: 5 TABLET ORAL at 14:56

## 2019-04-18 ASSESSMENT — ENCOUNTER SYMPTOMS
DIARRHEA: 0
NAUSEA: 0
WHEEZING: 0
VOMITING: 0
ABDOMINAL PAIN: 0
ABDOMINAL DISTENTION: 0
RHINORRHEA: 0
EYE DISCHARGE: 0
COUGH: 0
EYE ITCHING: 0
SHORTNESS OF BREATH: 0

## 2019-04-18 ASSESSMENT — PAIN SCALES - GENERAL
PAINLEVEL_OUTOF10: 5
PAINLEVEL_OUTOF10: 5

## 2019-04-18 ASSESSMENT — PAIN DESCRIPTION - PAIN TYPE: TYPE: ACUTE PAIN

## 2019-04-18 ASSESSMENT — PAIN DESCRIPTION - LOCATION: LOCATION: NECK

## 2019-04-18 ASSESSMENT — PAIN DESCRIPTION - ORIENTATION: ORIENTATION: RIGHT

## 2019-04-18 ASSESSMENT — PAIN DESCRIPTION - DESCRIPTORS: DESCRIPTORS: ACHING

## 2019-04-18 NOTE — ED PROVIDER NOTES
[facility]  eMERGENCY dEPARTMENT eNCOUnter          CHIEF COMPLAINT       Chief Complaint   Patient presents with    Neck Pain       Nurses Notes reviewed and I agreeexcept as noted in the HPI. HISTORY OF PRESENT ILLNESS    Izaiah Fuller is a 35 y.o. female who presents to Emergency Department with right side neck pain. Jadon Jimenez presents to emergency department because of right side neck pain and throat pain the last three days duration. This is a new symptom. Patient also says she has mild trouble swallowing. She has no fever or chills. No recent history of cold or sore throat. Patient says pain is at moderate severity worse on touch and neck movement. No cough. No chest pain. No nausea or vomiting. No abdominal pain. REVIEW OF SYSTEMS     Review of Systems   Constitutional: Negative for activity change, appetite change, chills, fatigue and fever. HENT: Negative for congestion, ear discharge, hearing loss, nosebleeds and rhinorrhea. Eyes: Negative for discharge and itching. Respiratory: Negative for cough, shortness of breath and wheezing. Cardiovascular: Negative for chest pain. Gastrointestinal: Negative for abdominal distention, abdominal pain, diarrhea, nausea and vomiting. Endocrine: Negative for cold intolerance. Genitourinary: Negative for dysuria and flank pain. Musculoskeletal: Positive for neck pain. Negative for arthralgias, myalgias and neck stiffness. Skin: Negative for rash and wound. Neurological: Negative for dizziness and weakness. Psychiatric/Behavioral: Negative for agitation and suicidal ideas.           PAST MEDICAL HISTORY    has a past medical history of Allergic rhinitis, Anxiety, CAD (coronary artery disease), Depression (emotion), DUB (dysfunctional uterine bleeding), Generalized anxiety disorder, GERD (gastroesophageal reflux disease), Hypertriglyceridemia, Hypoglycemia, Hypothyroid, Movement disorder, Pancreatitis, Psychiatric problem, Scoliosis, and Tobacco abuse. SURGICAL HISTORY      has a past surgical history that includes fracture surgery and knee surgery (08/2017). CURRENT MEDICATIONS       Discharge Medication List as of 4/18/2019  5:03 PM      CONTINUE these medications which have NOT CHANGED    Details   DULoxetine HCl 40 MG CPEP Take 40 mg by mouth daily, Disp-30 capsule, R-1Normal      clonazePAM (KLONOPIN) 0.5 MG tablet Take 1 tablet by mouth 2 times daily as needed for Anxiety for up to 30 doses. , Disp-60 tablet, R-1Normal      prazosin (MINIPRESS) 1 MG capsule Take 3 capsules by mouth nightly Take 1 mg for 4 days then 2 mg for 4 days then 3 mg at bedtime, Disp-30 capsule, R-1Normal      Dextromethorphan-Guaifenesin (MUCINEX DM)  MG TB12 Take 1 tablet by mouth 2 times daily, Disp-28 tablet, R-0Print      norethindrone (AYGESTIN) 5 MG tablet Take 5 mg by mouth dailyHistorical Med      albuterol sulfate HFA (VENTOLIN HFA) 108 (90 Base) MCG/ACT inhaler Inhale 2 puffs into the lungs every 4 hours as needed for Wheezing or Shortness of Breath, Disp-1 Inhaler, R-0Normal      fluticasone (FLONASE) 50 MCG/ACT nasal spray 1 spray by Each Nare route dailyHistorical Med      Progesterone Micronized (PROGESTERONE PO) Take by mouthHistorical Med      ondansetron (ZOFRAN) 4 MG tablet Take 1 tablet by mouth every 8 hours as needed for Nausea, Disp-6 tablet, R-0Print      acetaminophen (APAP EXTRA STRENGTH) 500 MG tablet Take 1 tablet by mouth every 6 hours as needed for Pain, Disp-120 tablet, R-3Print      levothyroxine (SYNTHROID) 25 MCG tablet Take 25 mcg by mouth Daily. ALLERGIES     is allergic to bee pollen. FAMILY HISTORY     indicated that her mother is alive. She indicated that her father is alive.  She indicated that the status of her sister is unknown.   family history includes Cancer in her sister; Diabetes in her father and mother; Heart Disease in her father; High Blood Pressure in her mother; Thyroid Disease in her mother. SOCIAL HISTORY      reports that she has been smoking cigarettes. She has a 15.00 pack-year smoking history. She has never used smokeless tobacco. She reports that she drinks alcohol. She reports that she does not use drugs. PHYSICAL EXAM     INITIAL VITALS:  weight is 240 lb (108.9 kg). Her oral temperature is 98 °F (36.7 °C). Her blood pressure is 117/80 and her pulse is 78. Her respiration is 16 and oxygen saturation is 98%. Physical Exam   Constitutional: She is oriented to person, place, and time. She appears well-developed and well-nourished. HENT:   Head: Normocephalic and atraumatic. Patient's oropharynx is clear, no tonsil enlargement, no tonsil swelling. She has no trismus. Eyes: Pupils are equal, round, and reactive to light. Right eye exhibits no discharge. Left eye exhibits no discharge. No scleral icterus. Neck: Normal range of motion. Neck supple. No JVD present. No tracheal deviation present. No thyromegaly present. Patient has tenderness at the right middle SCM with muscle spasm and trigger point. Cardiovascular: Normal rate, regular rhythm and normal heart sounds. Exam reveals no gallop and no friction rub. No murmur heard. Pulmonary/Chest: Effort normal. No stridor. No respiratory distress. She has no wheezes. Abdominal: Soft. There is no tenderness. There is no rebound and no guarding. Musculoskeletal: She exhibits no edema or tenderness. Lymphadenopathy:     She has no cervical adenopathy. Neurological: She is alert and oriented to person, place, and time. No cranial nerve deficit. Coordination normal.   Skin: Skin is warm and dry. She is not diaphoretic. Psychiatric: She has a normal mood and affect. Her behavior is normal. Judgment and thought content normal.   Nursing note and vitals reviewed. DIFFERENTIAL DIAGNOSIS:   Torticollis, neck strain, rule out peritonsillar abscess    DIAGNOSTIC RESULTS     EKG:  All EKG's are interpreted by the Emergency Department Physician who either signs or Co-signsthis chart in the absence of a cardiologist.  None    RADIOLOGY: non-plain film images(s) such as CT, Ultrasound and MRI are read by the radiologist.    No orders to display       []Visualized and interpreted by me   [] Radiologist's Wet Read Report Reviewed   [] Discussed with Radiologist.    Diamond See:   Results for orders placed or performed during the hospital encounter of 04/18/19   CBC Auto Differential   Result Value Ref Range    WBC 7.0 4.8 - 10.8 thou/mm3    RBC 4.40 4.20 - 5.40 mill/mm3    Hemoglobin 12.1 12.0 - 16.0 gm/dl    Hematocrit 37.9 37.0 - 47.0 %    MCV 86.1 81.0 - 99.0 fL    MCH 27.5 26.0 - 33.0 pg    MCHC 31.9 (L) 32.2 - 35.5 gm/dl    RDW-CV 14.1 11.5 - 14.5 %    RDW-SD 44.5 35.0 - 45.0 fL    Platelets 730 982 - 490 thou/mm3    MPV 9.8 9.4 - 12.4 fL    Seg Neutrophils 60.9 %    Lymphocytes 32.0 %    Monocytes 5.0 %    Eosinophils 1.4 %    Basophils 0.6 %    Immature Granulocytes 0.1 %    Segs Absolute 4.3 1.8 - 7.7 thou/mm3    Lymphocytes # 2.2 1.0 - 4.8 thou/mm3    Monocytes # 0.4 0.4 - 1.3 thou/mm3    Eosinophils # 0.1 0.0 - 0.4 thou/mm3    Basophils # 0.0 0.0 - 0.1 thou/mm3    Immature Grans (Abs) 0.01 0.00 - 0.07 thou/mm3    nRBC 0 /100 wbc   Basic Metabolic Panel   Result Value Ref Range    Sodium 140 135 - 145 meq/L    Potassium 4.7 3.5 - 5.2 meq/L    Chloride 104 98 - 111 meq/L    CO2 25 23 - 33 meq/L    Glucose 118 (H) 70 - 108 mg/dL    BUN 10 7 - 22 mg/dL    CREATININE 0.8 0.4 - 1.2 mg/dL    Calcium 8.9 8.5 - 10.5 mg/dL   C-reactive protein   Result Value Ref Range    CRP 0.31 0.00 - 1.00 mg/dl   Sedimentation Rate   Result Value Ref Range    Sed Rate 9 0 - 20 mm/hr   CK   Result Value Ref Range    Total  30 - 135 U/L   HCG Qualitative, Serum   Result Value Ref Range    Preg, Serum NEGATIVE NEGATIVE   Group A Strep, Reflex   Result Value Ref Range    GROUP A STREP CULTURE, REFLEX NEGATIVE NEGATIVE portions of this note were completed with a voice recognition program.  Efforts were made to edit the dictations but occasionally words aremis-transcribed.)    MD Ezequiel Robles MD  04/18/19 6920

## 2019-04-20 LAB — THROAT/NOSE CULTURE: NORMAL

## 2019-04-26 DIAGNOSIS — F41.0 PANIC DISORDER: ICD-10-CM

## 2019-04-26 RX ORDER — CLONAZEPAM 0.5 MG/1
0.5 TABLET ORAL 2 TIMES DAILY PRN
Qty: 60 TABLET | Refills: 1 | OUTPATIENT
Start: 2019-04-26 | End: 2019-05-27

## 2019-04-26 RX ORDER — PRAZOSIN HYDROCHLORIDE 1 MG/1
3 CAPSULE ORAL NIGHTLY
Qty: 30 CAPSULE | Refills: 1 | Status: SHIPPED | OUTPATIENT
Start: 2019-04-26 | End: 2019-05-21

## 2019-04-26 RX ORDER — PRAZOSIN HYDROCHLORIDE 1 MG/1
3 CAPSULE ORAL NIGHTLY
Qty: 30 CAPSULE | Refills: 1 | OUTPATIENT
Start: 2019-04-26

## 2019-04-26 NOTE — TELEPHONE ENCOUNTER
Covering for Dr. Yann Nieves. Approved Minipress refill.    Electronically signed by Alessandra Craig MD on 4/26/2019 at 2:09 PM

## 2019-04-26 NOTE — TELEPHONE ENCOUNTER
Sury Villafana is a patient of Dr. Lissa Bailey and she is out of the office/Country until Monday. Suri is requesting a refill of Minipress 1mg (total nightly dose 3mg) on Rozina's behalf. She only obtains a 10 day supply at a time. She attended an appointment in the office 4/10 and is scheduled to return 5/21.

## 2019-05-18 ENCOUNTER — HOSPITAL ENCOUNTER (EMERGENCY)
Age: 39
Discharge: HOME | End: 2019-05-18
Payer: COMMERCIAL

## 2019-05-18 VITALS
TEMPERATURE: 98.2 F | HEART RATE: 72 BPM | BODY MASS INDEX: 41.97 KG/M2 | RESPIRATION RATE: 16 BRPM | SYSTOLIC BLOOD PRESSURE: 116 MMHG | OXYGEN SATURATION: 94 % | WEIGHT: 268 LBS | DIASTOLIC BLOOD PRESSURE: 70 MMHG

## 2019-05-18 DIAGNOSIS — M62.838 SPASM OF MUSCLE: ICD-10-CM

## 2019-05-18 DIAGNOSIS — S16.1XXA ACUTE STRAIN OF NECK MUSCLE, INITIAL ENCOUNTER: Primary | ICD-10-CM

## 2019-05-18 PROCEDURE — 99212 OFFICE O/P EST SF 10 MIN: CPT

## 2019-05-18 PROCEDURE — 99214 OFFICE O/P EST MOD 30 MIN: CPT | Performed by: NURSE PRACTITIONER

## 2019-05-18 PROCEDURE — 96372 THER/PROPH/DIAG INJ SC/IM: CPT

## 2019-05-18 PROCEDURE — 6360000002 HC RX W HCPCS: Performed by: NURSE PRACTITIONER

## 2019-05-18 PROCEDURE — 2709999900 HC NON-CHARGEABLE SUPPLY

## 2019-05-18 RX ORDER — KETOROLAC TROMETHAMINE 30 MG/ML
30 INJECTION, SOLUTION INTRAMUSCULAR; INTRAVENOUS ONCE
Status: COMPLETED | OUTPATIENT
Start: 2019-05-18 | End: 2019-05-18

## 2019-05-18 RX ORDER — CYCLOBENZAPRINE HCL 5 MG
5 TABLET ORAL 3 TIMES DAILY PRN
Qty: 9 TABLET | Refills: 0 | Status: SHIPPED | OUTPATIENT
Start: 2019-05-18 | End: 2019-05-21

## 2019-05-18 RX ORDER — IBUPROFEN 600 MG/1
600 TABLET ORAL EVERY 8 HOURS PRN
Qty: 30 TABLET | Refills: 0 | Status: ON HOLD | OUTPATIENT
Start: 2019-05-18 | End: 2019-07-28 | Stop reason: HOSPADM

## 2019-05-18 RX ADMIN — KETOROLAC TROMETHAMINE 30 MG: 30 INJECTION, SOLUTION INTRAMUSCULAR at 15:59

## 2019-05-18 ASSESSMENT — PAIN - FUNCTIONAL ASSESSMENT: PAIN_FUNCTIONAL_ASSESSMENT: PREVENTS OR INTERFERES SOME ACTIVE ACTIVITIES AND ADLS

## 2019-05-18 ASSESSMENT — ENCOUNTER SYMPTOMS
SHORTNESS OF BREATH: 0
ABDOMINAL PAIN: 0
DIARRHEA: 0
SINUS PAIN: 0
SORE THROAT: 0
VOMITING: 0
PHOTOPHOBIA: 0
COUGH: 0
WHEEZING: 0
SINUS PRESSURE: 0
NAUSEA: 0
CONSTIPATION: 0
EYE PAIN: 0

## 2019-05-18 ASSESSMENT — PAIN DESCRIPTION - LOCATION: LOCATION: SHOULDER;NECK

## 2019-05-18 ASSESSMENT — PAIN DESCRIPTION - FREQUENCY: FREQUENCY: CONTINUOUS

## 2019-05-18 ASSESSMENT — PAIN SCALES - GENERAL
PAINLEVEL_OUTOF10: 10
PAINLEVEL_OUTOF10: 10
PAINLEVEL_OUTOF10: 8

## 2019-05-18 ASSESSMENT — PAIN DESCRIPTION - PAIN TYPE: TYPE: ACUTE PAIN

## 2019-05-18 ASSESSMENT — PAIN DESCRIPTION - PROGRESSION: CLINICAL_PROGRESSION: GRADUALLY WORSENING

## 2019-05-18 ASSESSMENT — PAIN DESCRIPTION - DESCRIPTORS: DESCRIPTORS: ACHING

## 2019-05-18 ASSESSMENT — PAIN DESCRIPTION - ONSET: ONSET: SUDDEN

## 2019-05-18 NOTE — ED TRIAGE NOTES
Ambulates to room in stable condition with c/o neck and shoulder pain since she woke up this am at 0900. Pt also reports that she has a cough and congestion. Pt denies injury to area. Pt lying on cot in room, boyfriend at bedside.

## 2019-05-18 NOTE — ED PROVIDER NOTES
[place]  [facility]gent [facility] Encounter      CHIEF COMPLAINT       Chief Complaint   Patient presents with    Neck Pain    Shoulder Pain       Nurses Notes reviewed and I agree except as noted in the HPI. HISTORY OF PRESENT ILLNESS   Juli Loomis is a 35 y.o. female who presents with complaints of bilateral neck pain that radiates into the upper shoulders. Patient states pain began this morning. Rates pain as a 10 out of 10 on the pain scale. Patient states the pain is constantly present and describes it as an aching sensation. Turning her head to the left or to the right increases the pain. She has tried icy hot over-the-counter as well as Tylenol and applying ice to the area without relief. She states she did a lot of mowing yesterday, which is unusual activity for her. She denies headache, dizziness, numbness, tingling. Patient denies any ear pain, sinus pressure congestion, sore throat. She has not had any nausea, vomiting, diarrhea. She denies photophobia. She denies any direct injury or trauma prior to the onset of symptoms. She is accompanied in the exam room by her  and her stepson. REVIEW OF SYSTEMS     Review of Systems   HENT: Negative for congestion, ear pain, sinus pressure, sinus pain and sore throat. Eyes: Negative for photophobia, pain and visual disturbance. Respiratory: Negative for cough, shortness of breath and wheezing. Cardiovascular: Negative for chest pain and palpitations. Gastrointestinal: Negative for abdominal pain, constipation, diarrhea, nausea and vomiting. Musculoskeletal: Positive for neck pain. Negative for neck stiffness. Skin: Negative for rash and wound. Neurological: Negative for dizziness, tremors, weakness, light-headedness, numbness and headaches.        PAST MEDICAL HISTORY         Diagnosis Date    Allergic rhinitis     Anxiety     CAD (coronary artery disease)     Depression (emotion)     DUB (dysfunctional uterine bleeding)     Generalized anxiety disorder     GERD (gastroesophageal reflux disease)     Hypertriglyceridemia 11/11/2014    Hypoglycemia     Hypothyroid 8/10/2014    Movement disorder     Pancreatitis 11/10/14    Psychiatric problem     Scoliosis     Tobacco abuse        SURGICAL HISTORY     Patient  has a past surgical history that includes fracture surgery and knee surgery (08/2017). CURRENT MEDICATIONS       Previous Medications    ACETAMINOPHEN (APAP EXTRA STRENGTH) 500 MG TABLET    Take 1 tablet by mouth every 6 hours as needed for Pain    ALBUTEROL SULFATE HFA (VENTOLIN HFA) 108 (90 BASE) MCG/ACT INHALER    Inhale 2 puffs into the lungs every 4 hours as needed for Wheezing or Shortness of Breath    CLONAZEPAM (KLONOPIN) 0.5 MG TABLET    Take 1 tablet by mouth 2 times daily as needed for Anxiety for up to 30 doses. DEXTROMETHORPHAN-GUAIFENESIN (MUCINEX DM)  MG TB12    Take 1 tablet by mouth 2 times daily    DULOXETINE HCL 40 MG CPEP    Take 40 mg by mouth daily    FLUTICASONE (FLONASE) 50 MCG/ACT NASAL SPRAY    1 spray by Each Nare route daily    LEVOTHYROXINE (SYNTHROID) 25 MCG TABLET    Take 25 mcg by mouth Daily. LIDOCAINE (LIDODERM) 5 %    Place 1 patch onto the skin daily 12 hours on, 12 hours off. NORETHINDRONE (AYGESTIN) 5 MG TABLET    Take 5 mg by mouth daily    PRAZOSIN (MINIPRESS) 1 MG CAPSULE    Take 3 capsules by mouth nightly    PROGESTERONE MICRONIZED (PROGESTERONE PO)    Take by mouth       ALLERGIES     Patient is is allergic to bee pollen. FAMILY HISTORY     Patient'sfamily history includes Cancer in her sister; Diabetes in her father and mother; Heart Disease in her father; High Blood Pressure in her mother; Thyroid Disease in her mother. SOCIAL HISTORY     Patient  reports that she has been smoking cigarettes. She has a 15.00 pack-year smoking history. She has never used smokeless tobacco. She reports that she drinks alcohol.  She reports that she does not use drugs. PHYSICAL EXAM     ED TRIAGE VITALS  BP: 112/70, Temp: 98.2 °F (36.8 °C), Pulse: 84, Resp: 16, SpO2: 94 %  Physical Exam   Constitutional: She is oriented to person, place, and time. Vital signs are normal. She appears well-developed and well-nourished. HENT:   Head: Normocephalic and atraumatic. Right Ear: Hearing, tympanic membrane, external ear and ear canal normal.   Left Ear: Hearing, tympanic membrane, external ear and ear canal normal.   Nose: Nose normal.   Mouth/Throat: Uvula is midline, oropharynx is clear and moist and mucous membranes are normal. Dental caries (poor dentition) present. Eyes: Pupils are equal, round, and reactive to light. Conjunctivae, EOM and lids are normal.   Neck: Neck supple. Muscular tenderness present. No spinous process tenderness present. No neck rigidity. Decreased range of motion present. No edema and no erythema present. No Brudzinski's sign noted. Cardiovascular: Normal rate, regular rhythm and normal heart sounds. Pulmonary/Chest: Effort normal and breath sounds normal.   Lymphadenopathy:     She has no cervical adenopathy. Neurological: She is alert and oriented to person, place, and time. She has normal strength. No cranial nerve deficit. GCS eye subscore is 4. GCS verbal subscore is 5. GCS motor subscore is 6. Skin: Skin is warm and dry. No rash noted. Nursing note and vitals reviewed. DIAGNOSTIC RESULTS   Labs: No results found for this visit on 05/18/19. IMAGING:    URGENT CARE COURSE:     Vitals:    05/18/19 1533   BP: 112/70   Pulse: 84   Resp: 16   Temp: 98.2 °F (36.8 °C)   TempSrc: Temporal   SpO2: 94%   Weight: 268 lb (121.6 kg)       Medications   ketorolac (TORADOL) injection 30 mg (30 mg Intramuscular Given 5/18/19 7937)     PROCEDURES:  None  FINAL IMPRESSION      1. Acute strain of neck muscle, initial encounter    2.  Spasm of muscle        DISPOSITION/PLAN   DISPOSITION Decision To Discharge

## 2019-05-21 ENCOUNTER — OFFICE VISIT (OUTPATIENT)
Dept: PSYCHIATRY | Age: 39
End: 2019-05-21
Payer: COMMERCIAL

## 2019-05-21 DIAGNOSIS — F33.1 MODERATE EPISODE OF RECURRENT MAJOR DEPRESSIVE DISORDER (HCC): Primary | ICD-10-CM

## 2019-05-21 DIAGNOSIS — F41.0 PANIC DISORDER: ICD-10-CM

## 2019-05-21 DIAGNOSIS — F43.10 PTSD (POST-TRAUMATIC STRESS DISORDER): ICD-10-CM

## 2019-05-21 PROCEDURE — G8417 CALC BMI ABV UP PARAM F/U: HCPCS | Performed by: PSYCHIATRY & NEUROLOGY

## 2019-05-21 PROCEDURE — 90833 PSYTX W PT W E/M 30 MIN: CPT | Performed by: PSYCHIATRY & NEUROLOGY

## 2019-05-21 PROCEDURE — G8428 CUR MEDS NOT DOCUMENT: HCPCS | Performed by: PSYCHIATRY & NEUROLOGY

## 2019-05-21 PROCEDURE — 99214 OFFICE O/P EST MOD 30 MIN: CPT | Performed by: PSYCHIATRY & NEUROLOGY

## 2019-05-21 PROCEDURE — 4004F PT TOBACCO SCREEN RCVD TLK: CPT | Performed by: PSYCHIATRY & NEUROLOGY

## 2019-05-21 RX ORDER — DULOXETIN HYDROCHLORIDE 20 MG/1
20 CAPSULE, DELAYED RELEASE ORAL DAILY
Qty: 30 CAPSULE | Refills: 1 | Status: SHIPPED | OUTPATIENT
Start: 2019-05-21 | End: 2019-07-18 | Stop reason: SDUPTHER

## 2019-05-21 RX ORDER — CLONAZEPAM 0.5 MG/1
0.5 TABLET ORAL 2 TIMES DAILY PRN
Qty: 60 TABLET | Refills: 1 | Status: SHIPPED | OUTPATIENT
Start: 2019-05-21 | End: 2019-07-18

## 2019-05-21 RX ORDER — PRAZOSIN HYDROCHLORIDE 1 MG/1
3 CAPSULE ORAL NIGHTLY
Qty: 90 CAPSULE | Refills: 1 | Status: SHIPPED | OUTPATIENT
Start: 2019-05-21 | End: 2019-09-19 | Stop reason: SDUPTHER

## 2019-05-21 NOTE — PROGRESS NOTES
PSYCHIATRIC FOLLOW UP NOTE     PATIENT NAME: Jennyfer Lawrence     : 1980   DATE of VISIT: 2019   Chief Complaint   Patient presents with    Depression          Interval History: Today I met with Ms. Tiny Talbot in follow up. We met for 23 minutes of psychotherapy and the remainder of the time was spent on symptom evaluation and medication management. She has had a very difficult few weeks: declining health of GM and close friend, visit with mother, poor school performance of naren, relationship with Tadeoministerio Almanzar. Discussed difficulty she has appropriately asking for help and negotiating mutuality in relationships    Mild anhedonia, sadness, feelings of hopelessness and \"needing a break\" with no clear SI. Prazosin was helping nightmares but she reduced dose for fear of running out and not wanting to call and ask for more    Current meds:   Current Outpatient Medications   Medication Sig Dispense Refill    prazosin (MINIPRESS) 1 MG capsule Take 3 capsules by mouth nightly 90 capsule 1    clonazePAM (KLONOPIN) 0.5 MG tablet Take 1 tablet by mouth 2 times daily as needed for Anxiety for up to 60 doses.  60 tablet 1    DULoxetine (CYMBALTA) 20 MG extended release capsule Take 1 capsule by mouth daily Along with 40 mg tablets for total dose of 60 mg daily 30 capsule 1    ibuprofen (ADVIL;MOTRIN) 600 MG tablet Take 1 tablet by mouth every 8 hours as needed for Pain (Take with food) 30 tablet 0    cyclobenzaprine (FLEXERIL) 5 MG tablet Take 1 tablet by mouth 3 times daily as needed for Muscle spasms 9 tablet 0    lidocaine (LIDODERM) 5 % Place 1 patch onto the skin daily 12 hours on, 12 hours off. 15 patch 0    DULoxetine HCl 40 MG CPEP Take 40 mg by mouth daily 30 capsule 1    Dextromethorphan-Guaifenesin (MUCINEX DM)  MG TB12 Take 1 tablet by mouth 2 times daily 28 tablet 0    norethindrone (AYGESTIN) 5 MG tablet Take 5 mg by mouth daily      albuterol sulfate HFA (VENTOLIN HFA) 108 (90 Base) MCG/ACT inhaler Inhale 2 puffs into the lungs every 4 hours as needed for Wheezing or Shortness of Breath 1 Inhaler 0    fluticasone (FLONASE) 50 MCG/ACT nasal spray 1 spray by Each Nare route daily      Progesterone Micronized (PROGESTERONE PO) Take by mouth      acetaminophen (APAP EXTRA STRENGTH) 500 MG tablet Take 1 tablet by mouth every 6 hours as needed for Pain 120 tablet 3    levothyroxine (SYNTHROID) 25 MCG tablet Take 25 mcg by mouth Daily. No current facility-administered medications for this visit. Mental Status Exam:   Appearance: Neatly groomed, appropriately dressed   Gait:wnl  Behavior: Cooperative and Appropriate   Eye Contact: Maintained good eye contact   Psychomotor Activity: Normal   Speech: Slow   Mood: Depressed   Affect: Full range, appropriate, and mood congruent   Thought Process: Goal directed and coherent   Associations: Goal directed and coherent   Thought content: The thought content was appropriate to questions. The patient denies any suicidal ideation or homicidal ideation. Perceptions: Perceptions were normal, the patient denied any auditory, tactile or visual hallucinations   Cognition: Patient is alert and oriented to time, place and person, no gross cognitive deficits   Memory:grossly intact  Attention:ok  Judgment: Judgment appeared normal and appropri ate   Insight: Good insight into illness   Impulse control: Intact     Assessment:   Mood not significantly improved  Anxiety managed on standing dose of clonazepam   Diagnosis:   1. Moderate episode of recurrent major depressive disorder ([facility] [address]    2. PTSD (post-traumatic stress disorder)    3.  Panic disorder        Plan:   Increase duloxetine to 60 mg  Continue clonazepam 0.5 mg bid  Prazosin 3 mg qHS   Continue outside psychotherapy  Return x 1 month

## 2019-05-26 ENCOUNTER — HOSPITAL ENCOUNTER (EMERGENCY)
Age: 39
Discharge: HOME | End: 2019-05-26
Attending: EMERGENCY MEDICINE
Payer: COMMERCIAL

## 2019-05-26 VITALS
BODY MASS INDEX: 37.67 KG/M2 | RESPIRATION RATE: 16 BRPM | DIASTOLIC BLOOD PRESSURE: 90 MMHG | HEART RATE: 70 BPM | SYSTOLIC BLOOD PRESSURE: 129 MMHG | OXYGEN SATURATION: 95 % | TEMPERATURE: 98.1 F | HEIGHT: 67 IN | WEIGHT: 240 LBS

## 2019-05-26 DIAGNOSIS — S46.912A STRAIN OF LEFT SHOULDER, INITIAL ENCOUNTER: Primary | ICD-10-CM

## 2019-05-26 DIAGNOSIS — S46.812A TRAPEZIUS STRAIN, LEFT, INITIAL ENCOUNTER: ICD-10-CM

## 2019-05-26 PROCEDURE — 6370000000 HC RX 637 (ALT 250 FOR IP): Performed by: EMERGENCY MEDICINE

## 2019-05-26 PROCEDURE — 6360000002 HC RX W HCPCS: Performed by: EMERGENCY MEDICINE

## 2019-05-26 PROCEDURE — 99283 EMERGENCY DEPT VISIT LOW MDM: CPT

## 2019-05-26 PROCEDURE — 96372 THER/PROPH/DIAG INJ SC/IM: CPT

## 2019-05-26 RX ORDER — CYCLOBENZAPRINE HCL 5 MG
5 TABLET ORAL NIGHTLY PRN
Qty: 5 TABLET | Refills: 0 | Status: SHIPPED | OUTPATIENT
Start: 2019-05-26 | End: 2019-05-31

## 2019-05-26 RX ORDER — CYCLOBENZAPRINE HCL 10 MG
10 TABLET ORAL ONCE
Status: COMPLETED | OUTPATIENT
Start: 2019-05-26 | End: 2019-05-26

## 2019-05-26 RX ORDER — NAPROXEN 500 MG/1
500 TABLET ORAL 2 TIMES DAILY
Qty: 10 TABLET | Refills: 0 | Status: ON HOLD | OUTPATIENT
Start: 2019-05-26 | End: 2019-07-28 | Stop reason: HOSPADM

## 2019-05-26 RX ORDER — LIDOCAINE 50 MG/G
1 PATCH TOPICAL DAILY
Qty: 30 PATCH | Refills: 0 | Status: SHIPPED | OUTPATIENT
Start: 2019-05-26 | End: 2019-06-25

## 2019-05-26 RX ORDER — LIDOCAINE 4 G/G
1 PATCH TOPICAL DAILY
Status: DISCONTINUED | OUTPATIENT
Start: 2019-05-26 | End: 2019-05-26 | Stop reason: HOSPADM

## 2019-05-26 RX ORDER — KETOROLAC TROMETHAMINE 30 MG/ML
15 INJECTION, SOLUTION INTRAMUSCULAR; INTRAVENOUS ONCE
Status: COMPLETED | OUTPATIENT
Start: 2019-05-26 | End: 2019-05-26

## 2019-05-26 RX ADMIN — CYCLOBENZAPRINE HYDROCHLORIDE 10 MG: 10 TABLET, FILM COATED ORAL at 08:19

## 2019-05-26 RX ADMIN — KETOROLAC TROMETHAMINE 15 MG: 30 INJECTION, SOLUTION INTRAMUSCULAR at 08:16

## 2019-05-26 ASSESSMENT — PAIN SCALES - GENERAL
PAINLEVEL_OUTOF10: 10
PAINLEVEL_OUTOF10: 10

## 2019-05-26 ASSESSMENT — PAIN DESCRIPTION - FREQUENCY: FREQUENCY: CONTINUOUS

## 2019-05-26 ASSESSMENT — PAIN DESCRIPTION - PAIN TYPE: TYPE: ACUTE PAIN

## 2019-05-26 ASSESSMENT — ENCOUNTER SYMPTOMS: BACK PAIN: 0

## 2019-05-26 ASSESSMENT — PAIN DESCRIPTION - ORIENTATION: ORIENTATION: LEFT

## 2019-05-26 ASSESSMENT — PAIN DESCRIPTION - LOCATION: LOCATION: NECK;SHOULDER

## 2019-05-26 NOTE — ED NOTES
Bed: 010A  Expected date:   Expected time:   Means of arrival: [facility]  Comments:     Sagar Mckeon RN  05/26/19 9728

## 2019-05-26 NOTE — ED NOTES
Patient presents to ED via EMS for left sided neck pain and left shoulder pain x3 weeks. States three weeks ago she woke up with the pain. Denies any known injury. Was seen at urgent care for same thing approximately two weeks ago and prescribed Flexeril and Ibuprofen. Rates pain 10/10. Shows no signs of distress at this time. Skin warm and dry. Respirations easy and unlabored.       Svetlana Knight RN  05/26/19 6933

## 2019-05-26 NOTE — ED PROVIDER NOTES
[facility]  eMERGENCY dEPARTMENT eNCOUnter          [address] [facility]       Chief Complaint   Patient presents with    Neck Pain     left    Shoulder Pain     left       Nurses Notes reviewed and I agree except as noted in the HPI. HISTORY OF PRESENT ILLNESS    Denisha Olson is a 35 y.o. female with a past medical history of anxiety, CAD, depression, GERD, and HLD. Patient presents to the ED via EMS for evaluation of neck pain. Patient has a three week history of left sided neck pain. Patient denies any known injury or trauma prior to onset of her pain. Patient states that she was seen at urgent care with onset of her pain and given a muscle relaxer which did help her pain, however, she is out of the medication and her pain has increased. Patient states that she has increased pain with movement of her neck. There is no radiation of her pain. She has a reported history of scoliosis. No additional complaints or concerns at the time of initial evaluation. REVIEW OFSYSTEMS     Review of Systems   Constitutional: Negative for chills and fever. Musculoskeletal: Positive for neck pain. Negative for arthralgias, back pain and myalgias. PAST MEDICAL HISTORY    has a past medical history of Allergic rhinitis, Anxiety, CAD (coronary artery disease), Depression (emotion), DUB (dysfunctional uterine bleeding), Generalized anxiety disorder, GERD (gastroesophageal reflux disease), Hypertriglyceridemia, Hypoglycemia, Hypothyroid, Movement disorder, Pancreatitis, Psychiatric problem, Scoliosis, and Tobacco abuse. SURGICAL HISTORY      has a past surgical history that includes fracture surgery and knee surgery (08/2017).     [address]       Discharge Medication List as of 5/26/2019  8:22 AM      CONTINUE these medications which have NOT CHANGED    Details   prazosin (MINIPRESS) 1 MG capsule Take 3 capsules by mouth nightly, Disp-90 capsule, R-1Normal      clonazePAM (KLONOPIN) 0.5 MG tablet Take 1 tablet by mouth 2 times daily as needed for Anxiety for up to 60 doses. , Disp-60 tablet, R-1Normal      !! DULoxetine (CYMBALTA) 20 MG extended release capsule Take 1 capsule by mouth daily Along with 40 mg tablets for total dose of 60 mg daily, Disp-30 capsule, R-1Normal      ibuprofen (ADVIL;MOTRIN) 600 MG tablet Take 1 tablet by mouth every 8 hours as needed for Pain (Take with food), Disp-30 tablet, R-0Normal      !! lidocaine (LIDODERM) 5 % Place 1 patch onto the skin daily 12 hours on, 12 hours off., Disp-15 patch, R-0Print      !! DULoxetine HCl 40 MG CPEP Take 40 mg by mouth daily, Disp-30 capsule, R-1Normal      Dextromethorphan-Guaifenesin (MUCINEX DM)  MG TB12 Take 1 tablet by mouth 2 times daily, Disp-28 tablet, R-0Print      norethindrone (AYGESTIN) 5 MG tablet Take 5 mg by mouth dailyHistorical Med      fluticasone (FLONASE) 50 MCG/ACT nasal spray 1 spray by Each Nare route dailyHistorical Med      Progesterone Micronized (PROGESTERONE PO) Take by mouthHistorical Med      acetaminophen (APAP EXTRA STRENGTH) 500 MG tablet Take 1 tablet by mouth every 6 hours as needed for Pain, Disp-120 tablet, R-3Print      levothyroxine (SYNTHROID) 25 MCG tablet Take 25 mcg by mouth Daily. albuterol sulfate HFA (VENTOLIN HFA) 108 (90 Base) MCG/ACT inhaler Inhale 2 puffs into the lungs every 4 hours as needed for Wheezing or Shortness of Breath, Disp-1 Inhaler, R-0Normal       !! - Potential duplicate medications found. Please discuss with provider. ALLERGIES   is allergic to bee pollen. FAMILY HISTORY     indicated that her mother is alive. She indicated that her father is alive. She indicated that the status of her sister is unknown.   family history includes Cancer in her sister; Diabetes in her father and mother; Heart Disease in her father; High Blood Pressure in her mother; Thyroid Disease in her mother.     SOCIAL HISTORY      reports that she has been smoking cigarettes. She has a 15.00 pack-year smoking history. She has never used smokeless tobacco. She reports that she drinks alcohol. She reports that she does not use drugs. PHYSICAL EXAM     INITIAL VITALS:  height is 5' 7\" (1.702 m) and weight is 240 lb (108.9 kg). Her oral temperature is 98.1 °F (36.7 °C). Her blood pressure is 129/90 (abnormal) and her pulse is 70. Her respiration is 16 and oxygen saturation is 95%. Physical Exam   Constitutional: She appears well-developed and well-nourished. HENT:   Head: Atraumatic. Eyes: EOM are normal.   Neck: Normal range of motion and full passive range of motion without pain. Neck supple. Muscular tenderness (left trapezius with spasm) present. No spinous process tenderness present. No neck rigidity. No erythema and normal range of motion present. Cardiovascular: Normal rate, regular rhythm and normal heart sounds. Exam reveals no friction rub. No murmur heard. Pulmonary/Chest: Effort normal and breath sounds normal. No stridor. No respiratory distress. She has no wheezes. Musculoskeletal: Normal range of motion. Cervical back: She exhibits tenderness (left trapezius) and spasm. She exhibits no bony tenderness. Neurological: She is alert. Nursing note and vitals reviewed. DIFFERENTIAL DIAGNOSIS:   Muscle strain, muscle spasm     DIAGNOSTIC RESULTS     RADIOLOGY:non-plain film images(s) such as CT, Ultrasound and MRI are read by the radiologist.    No orders to display       LABS:   Labs Reviewed - No data to display    EMERGENCY DEPARTMENT COURSE:   Vitals:    Vitals:    05/26/19 0741   BP: (!) 129/90   Pulse: 70   Resp: 16   Temp: 98.1 °F (36.7 °C)   TempSrc: Oral   SpO2: 95%   Weight: 240 lb (108.9 kg)   Height: 5' 7\" (1.702 m)       7:58 AM: The patient was seen and evaluated. MDM:  Patient seen and evaluated in the ED for neck pain. She presented to the ED in no acute distress.  There was left trapezius tenderness on exam with associated muscle spasm. No midline tenderness. ROM intact. Imaging not felt necessary as patient did not have any trauma, fall, or injury prior to onset of her pain. Patient treated with Toradol, Flexeril, and a lidocaine patch in the ED. She was discharged home in stable condition with instructions to follow up with her PCP for further management of her symptoms including possible referral to orthopedic specialist. Patient provided with a prescription for lidocaine, flexeril, and naproxen. The patient was amenable with all discharge and follow up instructions. All questions were addressed and answered. Return precautions were given for new or worsening symptoms. CRITICAL CARE:   None      CONSULTS:  None     PROCEDURES:  None      FINAL IMPRESSION      1. Strain of left shoulder, initial encounter    2. Trapezius strain, left, initial encounter          DISPOSITION/PLAN   Discharged     PATIENT REFERRED TO:  SHANA Chambers - CNP  [address] [phone]    In 3 days  RE-CHECK AND FURTHER TESTING AS NEEDED      DISCHARGE MEDICATIONS:  Discharge Medication List as of 5/26/2019  8:22 AM      START taking these medications    Details   !! lidocaine (LIDODERM) 5 % Place 1 patch onto the skin daily 12 hours on, 12 hours off., Disp-30 patch, R-0Print      cyclobenzaprine (FLEXERIL) 5 MG tablet Take 1 tablet by mouth nightly as needed for Muscle spasms, Disp-5 tablet, R-0Print      naproxen (NAPROSYN) 500 MG tablet Take 1 tablet by mouth 2 times daily for 5 days, Disp-10 tablet, R-0Print       !! - Potential duplicate medications found. Please discuss with provider. (Please note that portionsof this note were completed with a voice recognition program.  Efforts were made to edit the dictations but occasionally words are mis-transcribed.)    Scribe: Jenifer Adan 5/26/19 7:58 AM Scribing for and in the presence of Luzma Crouch DO.     Signed by: Alexa Calderon,05/26/19 12:04 PM    Provider:  I personally performed the services described in the documentation,reviewed and edited the documentation which wasdictated to the scribe in my presence, and it accurately records my words and actions.     Tamanna العلي DO. 5/26/19 12:04 PM            Tamanna العلي DO  05/26/19 1209

## 2019-06-13 DIAGNOSIS — F41.0 PANIC DISORDER: ICD-10-CM

## 2019-06-13 RX ORDER — DULOXETIN HYDROCHLORIDE 20 MG/1
20 CAPSULE, DELAYED RELEASE ORAL DAILY
Qty: 30 CAPSULE | Refills: 1 | OUTPATIENT
Start: 2019-06-13

## 2019-06-13 RX ORDER — PRAZOSIN HYDROCHLORIDE 1 MG/1
3 CAPSULE ORAL NIGHTLY
Qty: 90 CAPSULE | Refills: 1 | OUTPATIENT
Start: 2019-06-13

## 2019-06-13 RX ORDER — DULOXETINE 40 MG/1
40 CAPSULE, DELAYED RELEASE ORAL DAILY
Qty: 30 CAPSULE | Refills: 1 | Status: SHIPPED | OUTPATIENT
Start: 2019-06-13 | End: 2019-07-18 | Stop reason: DRUGHIGH

## 2019-06-13 RX ORDER — CLONAZEPAM 0.5 MG/1
0.5 TABLET ORAL 2 TIMES DAILY PRN
Qty: 60 TABLET | Refills: 1 | OUTPATIENT
Start: 2019-06-13 | End: 2019-07-14

## 2019-06-13 NOTE — TELEPHONE ENCOUNTER
Juancho Salvador wrote into the office stating that following:         Dr. Milla doyle, im really struggling my environmental situation has caused many many panic attacks. im really trying to be the best mom i can to my step son. im trying to be a good mate for my boyfriend and keep track of all the medical requirements and supplies for my son. i am extremely overwhelmed and on the verge of screaming and crying. I cant deal at all. I dont feel like the klonipin is helping. can you consider prescribing something else or refill my prescription? My mate alen has been working alot and i cant deal with this child by myself. please help. any suggestions? Please advise. Patient is not scheduled to be seen until 06/26/19.

## 2019-06-13 NOTE — TELEPHONE ENCOUNTER
I spoke with Joseph Alarcon. We agreed that she would call her stepson's therapist as she feels most of her anxiety is centered around his behavior, and the she and I will talk more at her next appointment.

## 2019-06-13 NOTE — TELEPHONE ENCOUNTER
Gay Sanders wrote into the office requesting several medications: In which all have 1 remaining refill so the Cymbalta 20mg, Klonopin 0.5mg and Minipress 1mg have all been refused. The only medication that records indicated that she needs a refill on is the:    Duloxetine 40mg;#30 with 1 refill;last with a start date of 04/10/19. This medication is pending your approval for a 30 day supply. Patient's last completed appt was on 05/21/19 to return on 06/26/19.

## 2019-06-26 ENCOUNTER — OFFICE VISIT (OUTPATIENT)
Dept: PSYCHIATRY | Age: 39
End: 2019-06-26
Payer: COMMERCIAL

## 2019-06-26 DIAGNOSIS — F33.2 SEVERE EPISODE OF RECURRENT MAJOR DEPRESSIVE DISORDER, WITHOUT PSYCHOTIC FEATURES (HCC): Primary | ICD-10-CM

## 2019-06-26 DIAGNOSIS — F41.0 PANIC DISORDER: ICD-10-CM

## 2019-06-26 DIAGNOSIS — F43.10 PTSD (POST-TRAUMATIC STRESS DISORDER): ICD-10-CM

## 2019-06-26 PROCEDURE — G8417 CALC BMI ABV UP PARAM F/U: HCPCS | Performed by: PSYCHIATRY & NEUROLOGY

## 2019-06-26 PROCEDURE — 4004F PT TOBACCO SCREEN RCVD TLK: CPT | Performed by: PSYCHIATRY & NEUROLOGY

## 2019-06-26 PROCEDURE — 90836 PSYTX W PT W E/M 45 MIN: CPT | Performed by: PSYCHIATRY & NEUROLOGY

## 2019-06-26 PROCEDURE — G8428 CUR MEDS NOT DOCUMENT: HCPCS | Performed by: PSYCHIATRY & NEUROLOGY

## 2019-06-26 PROCEDURE — 99213 OFFICE O/P EST LOW 20 MIN: CPT | Performed by: PSYCHIATRY & NEUROLOGY

## 2019-06-26 RX ORDER — BUPROPION HYDROCHLORIDE 75 MG/1
75 TABLET ORAL EVERY MORNING
Qty: 30 TABLET | Refills: 1 | Status: SHIPPED | OUTPATIENT
Start: 2019-06-26 | End: 2019-07-18 | Stop reason: SDUPTHER

## 2019-06-26 NOTE — PROGRESS NOTES
off. 15 patch 0    Dextromethorphan-Guaifenesin (MUCINEX DM)  MG TB12 Take 1 tablet by mouth 2 times daily 28 tablet 0    norethindrone (AYGESTIN) 5 MG tablet Take 5 mg by mouth daily      albuterol sulfate HFA (VENTOLIN HFA) 108 (90 Base) MCG/ACT inhaler Inhale 2 puffs into the lungs every 4 hours as needed for Wheezing or Shortness of Breath 1 Inhaler 0    fluticasone (FLONASE) 50 MCG/ACT nasal spray 1 spray by Each Nare route daily      Progesterone Micronized (PROGESTERONE PO) Take by mouth      acetaminophen (APAP EXTRA STRENGTH) 500 MG tablet Take 1 tablet by mouth every 6 hours as needed for Pain 120 tablet 3    levothyroxine (SYNTHROID) 25 MCG tablet Take 25 mcg by mouth Daily. No current facility-administered medications for this visit. Mental Status Exam:   Appearance: lying down on chairs in waiting room   Gait:wnl  Behavior: Cooperative and Appropriate   Eye Contact: Maintained good eye contact   Psychomotor Activity: Normal   Speech: Slow   Mood: Depressed   Affect: Dysphoric   Thought Process: Goal directed and coherent   Associations: Goal directed and coherent   Thought content: The thought content was appropriate to questions. The patient denies any suicidal ideation or homicidal ideation. Perceptions: Perceptions were normal, the patient denied any auditory, tactile or visual hallucinations   Cognition: Patient is alert and oriented to time, place and person, no gross cognitive deficits   Memory:grossly intact   Attention:ok  Judgment: Fair   Insight: Good insight into illness   Impulse control: Intact     Assessment:   Mood poor with increased anxiety  Discussed safety plan, will call office if mood worsens or go to ED if outside of office hours   Diagnosis:   1. Severe episode of recurrent major depressive disorder, without psychotic features ([facility] [address]    2. PTSD (post-traumatic stress disorder)    3.  Panic disorder        Plan:   Continue  Duloxetine 60 mg  Add bupropion 75 mg  Change clonazepam to standing dose 0.5 mg bid  Continue outside psychotherapy  Return x 3 weeks

## 2019-07-18 ENCOUNTER — OFFICE VISIT (OUTPATIENT)
Dept: PSYCHIATRY | Age: 39
End: 2019-07-18
Payer: COMMERCIAL

## 2019-07-18 DIAGNOSIS — F41.0 PANIC DISORDER: ICD-10-CM

## 2019-07-18 DIAGNOSIS — F43.10 PTSD (POST-TRAUMATIC STRESS DISORDER): ICD-10-CM

## 2019-07-18 DIAGNOSIS — F33.1 MODERATE EPISODE OF RECURRENT MAJOR DEPRESSIVE DISORDER (HCC): Primary | ICD-10-CM

## 2019-07-18 PROCEDURE — 90833 PSYTX W PT W E/M 30 MIN: CPT | Performed by: PSYCHIATRY & NEUROLOGY

## 2019-07-18 PROCEDURE — 4004F PT TOBACCO SCREEN RCVD TLK: CPT | Performed by: PSYCHIATRY & NEUROLOGY

## 2019-07-18 PROCEDURE — G8428 CUR MEDS NOT DOCUMENT: HCPCS | Performed by: PSYCHIATRY & NEUROLOGY

## 2019-07-18 PROCEDURE — G8417 CALC BMI ABV UP PARAM F/U: HCPCS | Performed by: PSYCHIATRY & NEUROLOGY

## 2019-07-18 PROCEDURE — 99213 OFFICE O/P EST LOW 20 MIN: CPT | Performed by: PSYCHIATRY & NEUROLOGY

## 2019-07-18 RX ORDER — DULOXETIN HYDROCHLORIDE 60 MG/1
60 CAPSULE, DELAYED RELEASE ORAL DAILY
Qty: 30 CAPSULE | Refills: 3 | Status: SHIPPED | OUTPATIENT
Start: 2019-07-18 | End: 2019-10-23 | Stop reason: SDUPTHER

## 2019-07-18 RX ORDER — CLONAZEPAM 0.5 MG/1
0.5 TABLET ORAL 2 TIMES DAILY PRN
Qty: 60 TABLET | Refills: 1 | Status: SHIPPED | OUTPATIENT
Start: 2019-07-18 | End: 2019-08-29 | Stop reason: SDUPTHER

## 2019-07-18 RX ORDER — BUPROPION HYDROCHLORIDE 75 MG/1
75 TABLET ORAL EVERY MORNING
Qty: 30 TABLET | Refills: 3 | Status: SHIPPED | OUTPATIENT
Start: 2019-07-18 | End: 2019-10-23 | Stop reason: DRUGHIGH

## 2019-07-25 ENCOUNTER — HOSPITAL ENCOUNTER (EMERGENCY)
Age: 39
Discharge: HOME | End: 2019-07-25
Payer: COMMERCIAL

## 2019-07-25 ENCOUNTER — APPOINTMENT (OUTPATIENT)
Dept: CT IMAGING | Age: 39
End: 2019-07-25
Payer: COMMERCIAL

## 2019-07-25 VITALS
TEMPERATURE: 97.3 F | HEART RATE: 67 BPM | OXYGEN SATURATION: 95 % | DIASTOLIC BLOOD PRESSURE: 96 MMHG | RESPIRATION RATE: 18 BRPM | SYSTOLIC BLOOD PRESSURE: 141 MMHG

## 2019-07-25 DIAGNOSIS — K52.9 COLITIS: Primary | ICD-10-CM

## 2019-07-25 LAB
ALBUMIN SERPL-MCNC: 3.7 G/DL (ref 3.5–5.1)
ALP BLD-CCNC: 76 U/L (ref 38–126)
ALT SERPL-CCNC: 10 U/L (ref 11–66)
ANION GAP SERPL CALCULATED.3IONS-SCNC: 9 MEQ/L (ref 8–16)
AST SERPL-CCNC: 13 U/L (ref 5–40)
BACTERIA: NORMAL
BASOPHILS # BLD: 0.4 %
BASOPHILS ABSOLUTE: 0 THOU/MM3 (ref 0–0.1)
BILIRUB SERPL-MCNC: 0.2 MG/DL (ref 0.3–1.2)
BILIRUBIN URINE: NEGATIVE
BLOOD, URINE: NEGATIVE
BUN BLDV-MCNC: 12 MG/DL (ref 7–22)
CALCIUM SERPL-MCNC: 9.8 MG/DL (ref 8.5–10.5)
CASTS: NORMAL /LPF
CASTS: NORMAL /LPF
CHARACTER, URINE: NORMAL
CHLORIDE BLD-SCNC: 104 MEQ/L (ref 98–111)
CO2: 28 MEQ/L (ref 23–33)
COLOR: YELLOW
CREAT SERPL-MCNC: 0.8 MG/DL (ref 0.4–1.2)
CRYSTALS: NORMAL
EOSINOPHIL # BLD: 1 %
EOSINOPHILS ABSOLUTE: 0.1 THOU/MM3 (ref 0–0.4)
EPITHELIAL CELLS, UA: NORMAL /HPF
ERYTHROCYTE [DISTWIDTH] IN BLOOD BY AUTOMATED COUNT: 14.3 % (ref 11.5–14.5)
ERYTHROCYTE [DISTWIDTH] IN BLOOD BY AUTOMATED COUNT: 46 FL (ref 35–45)
GFR SERPL CREATININE-BSD FRML MDRD: 79 ML/MIN/1.73M2
GLUCOSE BLD-MCNC: 107 MG/DL (ref 70–108)
GLUCOSE, URINE: NEGATIVE MG/DL
HCT VFR BLD CALC: 44.1 % (ref 37–47)
HEMOCCULT STL QL: NEGATIVE
HEMOGLOBIN: 14 GM/DL (ref 12–16)
IMMATURE GRANS (ABS): 0.03 THOU/MM3 (ref 0–0.07)
IMMATURE GRANULOCYTES: 0.3 %
KETONES, URINE: NEGATIVE
LEUKOCYTE ESTERASE, URINE: NEGATIVE
LIPASE: 14.3 U/L (ref 5.6–51.3)
LYMPHOCYTES # BLD: 16.4 %
LYMPHOCYTES ABSOLUTE: 1.8 THOU/MM3 (ref 1–4.8)
MCH RBC QN AUTO: 27.9 PG (ref 26–33)
MCHC RBC AUTO-ENTMCNC: 31.7 GM/DL (ref 32.2–35.5)
MCV RBC AUTO: 87.8 FL (ref 81–99)
MISCELLANEOUS LAB TEST RESULT: NORMAL
MONOCYTES # BLD: 5.2 %
MONOCYTES ABSOLUTE: 0.6 THOU/MM3 (ref 0.4–1.3)
NITRITE, URINE: NEGATIVE
NUCLEATED RED BLOOD CELLS: 0 /100 WBC
OSMOLALITY CALCULATION: 281.5 MOSMOL/KG (ref 275–300)
PH UA: 6.5 (ref 5–9)
PLATELET # BLD: 297 THOU/MM3 (ref 130–400)
PMV BLD AUTO: 10.2 FL (ref 9.4–12.4)
POTASSIUM SERPL-SCNC: 4.6 MEQ/L (ref 3.5–5.2)
PREGNANCY, SERUM: NEGATIVE
PROTEIN UA: NEGATIVE MG/DL
RBC # BLD: 5.02 MILL/MM3 (ref 4.2–5.4)
RBC URINE: NORMAL /HPF
REASON FOR REJECTION: NORMAL
REJECTED TEST: NORMAL
RENAL EPITHELIAL, UA: NORMAL
SEG NEUTROPHILS: 76.7 %
SEGMENTED NEUTROPHILS ABSOLUTE COUNT: 8.4 THOU/MM3 (ref 1.8–7.7)
SODIUM BLD-SCNC: 141 MEQ/L (ref 135–145)
SPECIFIC GRAVITY UA: 1.02 (ref 1–1.03)
TOTAL PROTEIN: 6.1 G/DL (ref 6.1–8)
TSH SERPL DL<=0.05 MIU/L-ACNC: 2.17 UIU/ML (ref 0.4–4.2)
UROBILINOGEN, URINE: 1 EU/DL (ref 0–1)
WBC # BLD: 11 THOU/MM3 (ref 4.8–10.8)
WBC UA: NORMAL /HPF
YEAST: NORMAL

## 2019-07-25 PROCEDURE — 85025 COMPLETE CBC W/AUTO DIFF WBC: CPT

## 2019-07-25 PROCEDURE — 84443 ASSAY THYROID STIM HORMONE: CPT

## 2019-07-25 PROCEDURE — 6360000004 HC RX CONTRAST MEDICATION: Performed by: EMERGENCY MEDICINE

## 2019-07-25 PROCEDURE — 36415 COLL VENOUS BLD VENIPUNCTURE: CPT

## 2019-07-25 PROCEDURE — 84703 CHORIONIC GONADOTROPIN ASSAY: CPT

## 2019-07-25 PROCEDURE — 96374 THER/PROPH/DIAG INJ IV PUSH: CPT

## 2019-07-25 PROCEDURE — 6370000000 HC RX 637 (ALT 250 FOR IP): Performed by: EMERGENCY MEDICINE

## 2019-07-25 PROCEDURE — 82272 OCCULT BLD FECES 1-3 TESTS: CPT

## 2019-07-25 PROCEDURE — 96372 THER/PROPH/DIAG INJ SC/IM: CPT

## 2019-07-25 PROCEDURE — 83690 ASSAY OF LIPASE: CPT

## 2019-07-25 PROCEDURE — 81001 URINALYSIS AUTO W/SCOPE: CPT

## 2019-07-25 PROCEDURE — 96375 TX/PRO/DX INJ NEW DRUG ADDON: CPT

## 2019-07-25 PROCEDURE — 74177 CT ABD & PELVIS W/CONTRAST: CPT

## 2019-07-25 PROCEDURE — 99284 EMERGENCY DEPT VISIT MOD MDM: CPT

## 2019-07-25 PROCEDURE — 80053 COMPREHEN METABOLIC PANEL: CPT

## 2019-07-25 PROCEDURE — 6360000002 HC RX W HCPCS: Performed by: EMERGENCY MEDICINE

## 2019-07-25 RX ORDER — HYDROCODONE BITARTRATE AND ACETAMINOPHEN 5; 325 MG/1; MG/1
1 TABLET ORAL EVERY 6 HOURS PRN
Qty: 12 TABLET | Refills: 0 | Status: SHIPPED | OUTPATIENT
Start: 2019-07-25 | End: 2019-07-28

## 2019-07-25 RX ORDER — KETOROLAC TROMETHAMINE 30 MG/ML
15 INJECTION, SOLUTION INTRAMUSCULAR; INTRAVENOUS ONCE
Status: COMPLETED | OUTPATIENT
Start: 2019-07-25 | End: 2019-07-25

## 2019-07-25 RX ORDER — DICYCLOMINE HYDROCHLORIDE 10 MG/1
10 CAPSULE ORAL 4 TIMES DAILY PRN
Qty: 20 CAPSULE | Refills: 0 | Status: SHIPPED | OUTPATIENT
Start: 2019-07-25 | End: 2021-01-04

## 2019-07-25 RX ORDER — ONDANSETRON 4 MG/1
4 TABLET, ORALLY DISINTEGRATING ORAL EVERY 8 HOURS PRN
Qty: 15 TABLET | Refills: 0 | Status: SHIPPED | OUTPATIENT
Start: 2019-07-25 | End: 2019-08-29 | Stop reason: SDUPTHER

## 2019-07-25 RX ORDER — DICYCLOMINE HYDROCHLORIDE 10 MG/ML
20 INJECTION INTRAMUSCULAR ONCE
Status: COMPLETED | OUTPATIENT
Start: 2019-07-25 | End: 2019-07-25

## 2019-07-25 RX ORDER — ONDANSETRON 2 MG/ML
4 INJECTION INTRAMUSCULAR; INTRAVENOUS ONCE
Status: COMPLETED | OUTPATIENT
Start: 2019-07-25 | End: 2019-07-25

## 2019-07-25 RX ADMIN — DICYCLOMINE HYDROCHLORIDE 20 MG: 20 INJECTION, SOLUTION INTRAMUSCULAR at 13:15

## 2019-07-25 RX ADMIN — KETOROLAC TROMETHAMINE 15 MG: 30 INJECTION, SOLUTION INTRAMUSCULAR at 14:23

## 2019-07-25 RX ADMIN — IOPAMIDOL 80 ML: 755 INJECTION, SOLUTION INTRAVENOUS at 15:27

## 2019-07-25 RX ADMIN — Medication: at 14:23

## 2019-07-25 RX ADMIN — ONDANSETRON 4 MG: 2 INJECTION INTRAMUSCULAR; INTRAVENOUS at 13:15

## 2019-07-25 ASSESSMENT — ENCOUNTER SYMPTOMS
SORE THROAT: 0
DIARRHEA: 1
EYE DISCHARGE: 0
ABDOMINAL PAIN: 1
COUGH: 0
BLOOD IN STOOL: 1
SHORTNESS OF BREATH: 0
BACK PAIN: 0
WHEEZING: 0
NAUSEA: 1
RHINORRHEA: 0
EYE PAIN: 0
VOMITING: 1

## 2019-07-25 ASSESSMENT — PAIN DESCRIPTION - PAIN TYPE
TYPE: ACUTE PAIN
TYPE: ACUTE PAIN

## 2019-07-25 ASSESSMENT — PAIN SCALES - GENERAL
PAINLEVEL_OUTOF10: 7
PAINLEVEL_OUTOF10: 9

## 2019-07-25 ASSESSMENT — PAIN DESCRIPTION - LOCATION
LOCATION: ABDOMEN
LOCATION: ABDOMEN

## 2019-07-25 NOTE — ED PROVIDER NOTES
[facility]  eMERGENCY dEPARTMENT eNCOUnter          CHIEF COMPLAINT       Chief Complaint   Patient presents with    Abdominal Pain       Nurses Notes reviewed and I agree except as noted in the HPI. HISTORY OF PRESENT ILLNESS    Pilo Jimenes is a 35 y.o. female who presents to the Emergency Department for the evaluation of generalized abdominal pain that woke the patient up from sleep this morning. She describes her pain bloating and cramping with 9/10 severity and constant in duration. She reports associated nausea, emesis, and diarrhea. She states that she noticed blood on the toilet paper and scattered in the toilet. She stats the blood is bright red on the toilet paper and burgundy in the toilet. The patient has a history of chronic pancreatitis but has not had issues for 3 years. She reports the pain is similar to her pancreatitis. Patient explains that she has been drinking alcohol the last couple weekends and states \"this is what I get for drinking\". Patient denies fever, chills, constipation, urinary symptoms, shortness of breath, or chest pain. No further complaints at initial evaluation. The HPI was provided by the patient. REVIEW OF SYSTEMS     Review of Systems   Constitutional: Negative for appetite change, chills, fatigue and fever. HENT: Negative for congestion, ear pain, rhinorrhea and sore throat. Eyes: Negative for pain, discharge and visual disturbance. Respiratory: Negative for cough, shortness of breath and wheezing. Cardiovascular: Negative for chest pain, palpitations and leg swelling. Gastrointestinal: Positive for abdominal pain, blood in stool, diarrhea, nausea and vomiting. Genitourinary: Negative for difficulty urinating, dysuria, flank pain, frequency, hematuria, urgency and vaginal discharge. Musculoskeletal: Negative for arthralgias, back pain, joint swelling and neck pain. Skin: Negative for pallor and rash.    Neurological:

## 2019-07-26 ENCOUNTER — HOSPITAL ENCOUNTER (INPATIENT)
Age: 39
LOS: 2 days | Discharge: HOME | DRG: 246 | End: 2019-07-28
Attending: EMERGENCY MEDICINE | Admitting: INTERNAL MEDICINE
Payer: COMMERCIAL

## 2019-07-26 DIAGNOSIS — K52.9 COLITIS: Primary | ICD-10-CM

## 2019-07-26 LAB
ADENOVIRUS F 40 41 PCR: NOT DETECTED
ALBUMIN SERPL-MCNC: 3.9 G/DL (ref 3.5–5.1)
ALP BLD-CCNC: 83 U/L (ref 38–126)
ALT SERPL-CCNC: 12 U/L (ref 11–66)
ANION GAP SERPL CALCULATED.3IONS-SCNC: 12 MEQ/L (ref 8–16)
APTT: 34.3 SECONDS (ref 22–38)
AST SERPL-CCNC: 14 U/L (ref 5–40)
ASTROVIRUS PCR: NOT DETECTED
BASOPHILS # BLD: 0.3 %
BASOPHILS ABSOLUTE: 0 THOU/MM3 (ref 0–0.1)
BILIRUB SERPL-MCNC: 0.2 MG/DL (ref 0.3–1.2)
BUN BLDV-MCNC: 16 MG/DL (ref 7–22)
CALCIUM SERPL-MCNC: 9 MG/DL (ref 8.5–10.5)
CAMPYLOBACTER PCR: NOT DETECTED
CHLORIDE BLD-SCNC: 100 MEQ/L (ref 98–111)
CLOSTRIDIUM DIFFICILE, PCR: NOT DETECTED
CO2: 26 MEQ/L (ref 23–33)
CREAT SERPL-MCNC: 1 MG/DL (ref 0.4–1.2)
CRYPTOSPORIDIUM PCR: NOT DETECTED
CYCLOSPORA CAYETANENSIS PCR: NOT DETECTED
E COLI 0157 PCR: NORMAL
E COLI ENTEROAGGREGATIVE PCR: NOT DETECTED
E COLI ENTEROPATHOGENIC PCR: NOT DETECTED
E COLI ENTEROTOXIGENIC PCR: NOT DETECTED
E COLI SHIGA LIKE TOXIN PCR: NOT DETECTED
E COLI SHIGELLA/ENTEROINVASIVE PCR: NOT DETECTED
E HISTOLYTICA GI FILM ARRAY: NOT DETECTED
EOSINOPHIL # BLD: 1.4 %
EOSINOPHILS ABSOLUTE: 0.2 THOU/MM3 (ref 0–0.4)
ERYTHROCYTE [DISTWIDTH] IN BLOOD BY AUTOMATED COUNT: 14.5 % (ref 11.5–14.5)
ERYTHROCYTE [DISTWIDTH] IN BLOOD BY AUTOMATED COUNT: 46.5 FL (ref 35–45)
GFR SERPL CREATININE-BSD FRML MDRD: 61 ML/MIN/1.73M2
GIARDIA LAMBLIA PCR: NOT DETECTED
GLUCOSE BLD-MCNC: 95 MG/DL (ref 70–108)
HCT VFR BLD CALC: 37.2 % (ref 37–47)
HCT VFR BLD CALC: 38.8 % (ref 37–47)
HCT VFR BLD CALC: 42 % (ref 37–47)
HEMOCCULT STL QL: POSITIVE
HEMOGLOBIN: 11.6 GM/DL (ref 12–16)
HEMOGLOBIN: 12.2 GM/DL (ref 12–16)
HEMOGLOBIN: 13.3 GM/DL (ref 12–16)
IMMATURE GRANS (ABS): 0.04 THOU/MM3 (ref 0–0.07)
IMMATURE GRANULOCYTES: 0.3 %
INR BLD: 0.98 (ref 0.85–1.13)
LYMPHOCYTES # BLD: 13.4 %
LYMPHOCYTES ABSOLUTE: 1.9 THOU/MM3 (ref 1–4.8)
MCH RBC QN AUTO: 27.7 PG (ref 26–33)
MCHC RBC AUTO-ENTMCNC: 31.7 GM/DL (ref 32.2–35.5)
MCV RBC AUTO: 87.5 FL (ref 81–99)
MONOCYTES # BLD: 6 %
MONOCYTES ABSOLUTE: 0.8 THOU/MM3 (ref 0.4–1.3)
NOROVIRUS GI GII PCR: NOT DETECTED
NUCLEATED RED BLOOD CELLS: 0 /100 WBC
OSMOLALITY CALCULATION: 276.7 MOSMOL/KG (ref 275–300)
PLATELET # BLD: 287 THOU/MM3 (ref 130–400)
PLESIOMONAS SHIGELLOIDES PCR: NOT DETECTED
PMV BLD AUTO: 9.9 FL (ref 9.4–12.4)
POTASSIUM SERPL-SCNC: 4.1 MEQ/L (ref 3.5–5.2)
RBC # BLD: 4.8 MILL/MM3 (ref 4.2–5.4)
ROTAVIRUS A PCR: NOT DETECTED
SALMONELLA PCR: NOT DETECTED
SAPOVIRUS PCR: NOT DETECTED
SEG NEUTROPHILS: 78.6 %
SEGMENTED NEUTROPHILS ABSOLUTE COUNT: 11.1 THOU/MM3 (ref 1.8–7.7)
SODIUM BLD-SCNC: 138 MEQ/L (ref 135–145)
TOTAL PROTEIN: 6.5 G/DL (ref 6.1–8)
VIBRIO CHOLERAE PCR: NOT DETECTED
VIBRIO PCR: NOT DETECTED
WBC # BLD: 14.1 THOU/MM3 (ref 4.8–10.8)
YERSINIA ENTEROCOLITICA PCR: NOT DETECTED

## 2019-07-26 PROCEDURE — 6360000002 HC RX W HCPCS: Performed by: EMERGENCY MEDICINE

## 2019-07-26 PROCEDURE — 0097U HC GI PTHGN MULT REV TRANS & AMP PRB TECH 22 TRGT: CPT

## 2019-07-26 PROCEDURE — 85014 HEMATOCRIT: CPT

## 2019-07-26 PROCEDURE — 85025 COMPLETE CBC W/AUTO DIFF WBC: CPT

## 2019-07-26 PROCEDURE — 96376 TX/PRO/DX INJ SAME DRUG ADON: CPT

## 2019-07-26 PROCEDURE — 99223 1ST HOSP IP/OBS HIGH 75: CPT | Performed by: PHYSICIAN ASSISTANT

## 2019-07-26 PROCEDURE — 36415 COLL VENOUS BLD VENIPUNCTURE: CPT

## 2019-07-26 PROCEDURE — 80053 COMPREHEN METABOLIC PANEL: CPT

## 2019-07-26 PROCEDURE — 85018 HEMOGLOBIN: CPT

## 2019-07-26 PROCEDURE — 96375 TX/PRO/DX INJ NEW DRUG ADDON: CPT

## 2019-07-26 PROCEDURE — 85610 PROTHROMBIN TIME: CPT

## 2019-07-26 PROCEDURE — G0378 HOSPITAL OBSERVATION PER HR: HCPCS

## 2019-07-26 PROCEDURE — 99284 EMERGENCY DEPT VISIT MOD MDM: CPT

## 2019-07-26 PROCEDURE — 96361 HYDRATE IV INFUSION ADD-ON: CPT

## 2019-07-26 PROCEDURE — 1200000003 HC TELEMETRY R&B

## 2019-07-26 PROCEDURE — 85730 THROMBOPLASTIN TIME PARTIAL: CPT

## 2019-07-26 PROCEDURE — 6370000000 HC RX 637 (ALT 250 FOR IP): Performed by: NURSE PRACTITIONER

## 2019-07-26 PROCEDURE — 82272 OCCULT BLD FECES 1-3 TESTS: CPT

## 2019-07-26 PROCEDURE — 6360000002 HC RX W HCPCS: Performed by: INTERNAL MEDICINE

## 2019-07-26 PROCEDURE — C9113 INJ PANTOPRAZOLE SODIUM, VIA: HCPCS | Performed by: EMERGENCY MEDICINE

## 2019-07-26 PROCEDURE — 2580000003 HC RX 258: Performed by: EMERGENCY MEDICINE

## 2019-07-26 PROCEDURE — 6360000002 HC RX W HCPCS: Performed by: PHYSICIAN ASSISTANT

## 2019-07-26 PROCEDURE — 2580000003 HC RX 258: Performed by: PHYSICIAN ASSISTANT

## 2019-07-26 PROCEDURE — 6370000000 HC RX 637 (ALT 250 FOR IP): Performed by: PHYSICIAN ASSISTANT

## 2019-07-26 PROCEDURE — 96374 THER/PROPH/DIAG INJ IV PUSH: CPT

## 2019-07-26 PROCEDURE — 99232 SBSQ HOSP IP/OBS MODERATE 35: CPT | Performed by: INTERNAL MEDICINE

## 2019-07-26 PROCEDURE — 96372 THER/PROPH/DIAG INJ SC/IM: CPT

## 2019-07-26 RX ORDER — DIPHENHYDRAMINE HYDROCHLORIDE 50 MG/ML
25 INJECTION INTRAMUSCULAR; INTRAVENOUS EVERY 6 HOURS PRN
Status: DISCONTINUED | OUTPATIENT
Start: 2019-07-26 | End: 2019-07-28 | Stop reason: HOSPADM

## 2019-07-26 RX ORDER — PANTOPRAZOLE SODIUM 40 MG/10ML
40 INJECTION, POWDER, LYOPHILIZED, FOR SOLUTION INTRAVENOUS ONCE
Status: COMPLETED | OUTPATIENT
Start: 2019-07-26 | End: 2019-07-26

## 2019-07-26 RX ORDER — PRAZOSIN HYDROCHLORIDE 1 MG/1
3 CAPSULE ORAL NIGHTLY
Status: DISCONTINUED | OUTPATIENT
Start: 2019-07-26 | End: 2019-07-28 | Stop reason: HOSPADM

## 2019-07-26 RX ORDER — 0.9 % SODIUM CHLORIDE 0.9 %
1000 INTRAVENOUS SOLUTION INTRAVENOUS ONCE
Status: COMPLETED | OUTPATIENT
Start: 2019-07-26 | End: 2019-07-26

## 2019-07-26 RX ORDER — SODIUM CHLORIDE 9 MG/ML
INJECTION, SOLUTION INTRAVENOUS CONTINUOUS
Status: DISCONTINUED | OUTPATIENT
Start: 2019-07-26 | End: 2019-07-27

## 2019-07-26 RX ORDER — BUPROPION HYDROCHLORIDE 75 MG/1
75 TABLET ORAL EVERY MORNING
Status: DISCONTINUED | OUTPATIENT
Start: 2019-07-26 | End: 2019-07-28 | Stop reason: HOSPADM

## 2019-07-26 RX ORDER — SODIUM CHLORIDE 0.9 % (FLUSH) 0.9 %
10 SYRINGE (ML) INJECTION EVERY 12 HOURS SCHEDULED
Status: DISCONTINUED | OUTPATIENT
Start: 2019-07-26 | End: 2019-07-26 | Stop reason: SDUPTHER

## 2019-07-26 RX ORDER — SODIUM CHLORIDE 9 MG/ML
INJECTION, SOLUTION INTRAVENOUS CONTINUOUS
Status: DISCONTINUED | OUTPATIENT
Start: 2019-07-26 | End: 2019-07-26 | Stop reason: SDUPTHER

## 2019-07-26 RX ORDER — ACETAMINOPHEN 325 MG/1
650 TABLET ORAL EVERY 4 HOURS PRN
Status: DISCONTINUED | OUTPATIENT
Start: 2019-07-26 | End: 2019-07-28 | Stop reason: HOSPADM

## 2019-07-26 RX ORDER — FLUTICASONE PROPIONATE 50 MCG
1 SPRAY, SUSPENSION (ML) NASAL DAILY
Status: DISCONTINUED | OUTPATIENT
Start: 2019-07-26 | End: 2019-07-28 | Stop reason: HOSPADM

## 2019-07-26 RX ORDER — ONDANSETRON 2 MG/ML
4 INJECTION INTRAMUSCULAR; INTRAVENOUS EVERY 30 MIN PRN
Status: DISCONTINUED | OUTPATIENT
Start: 2019-07-26 | End: 2019-07-26 | Stop reason: SDUPTHER

## 2019-07-26 RX ORDER — CLONAZEPAM 0.5 MG/1
0.5 TABLET ORAL 2 TIMES DAILY PRN
Status: DISCONTINUED | OUTPATIENT
Start: 2019-07-26 | End: 2019-07-28 | Stop reason: HOSPADM

## 2019-07-26 RX ORDER — DICYCLOMINE HYDROCHLORIDE 10 MG/1
10 CAPSULE ORAL 4 TIMES DAILY PRN
Status: DISCONTINUED | OUTPATIENT
Start: 2019-07-26 | End: 2019-07-28 | Stop reason: HOSPADM

## 2019-07-26 RX ORDER — 0.9 % SODIUM CHLORIDE 0.9 %
10 VIAL (ML) INJECTION PRN
Status: DISCONTINUED | OUTPATIENT
Start: 2019-07-26 | End: 2019-07-28 | Stop reason: HOSPADM

## 2019-07-26 RX ORDER — ONDANSETRON 4 MG/1
4 TABLET, ORALLY DISINTEGRATING ORAL EVERY 8 HOURS PRN
Status: DISCONTINUED | OUTPATIENT
Start: 2019-07-26 | End: 2019-07-28 | Stop reason: HOSPADM

## 2019-07-26 RX ORDER — DULOXETIN HYDROCHLORIDE 60 MG/1
60 CAPSULE, DELAYED RELEASE ORAL DAILY
Status: DISCONTINUED | OUTPATIENT
Start: 2019-07-26 | End: 2019-07-28 | Stop reason: HOSPADM

## 2019-07-26 RX ORDER — LEVOTHYROXINE SODIUM 0.03 MG/1
25 TABLET ORAL DAILY
Status: DISCONTINUED | OUTPATIENT
Start: 2019-07-26 | End: 2019-07-28 | Stop reason: HOSPADM

## 2019-07-26 RX ORDER — ONDANSETRON 2 MG/ML
4 INJECTION INTRAMUSCULAR; INTRAVENOUS EVERY 6 HOURS PRN
Status: DISCONTINUED | OUTPATIENT
Start: 2019-07-26 | End: 2019-07-28 | Stop reason: HOSPADM

## 2019-07-26 RX ORDER — SODIUM CHLORIDE 0.9 % (FLUSH) 0.9 %
10 SYRINGE (ML) INJECTION EVERY 12 HOURS SCHEDULED
Status: DISCONTINUED | OUTPATIENT
Start: 2019-07-26 | End: 2019-07-28 | Stop reason: HOSPADM

## 2019-07-26 RX ORDER — POLYETHYLENE GLYCOL 3350, SODIUM CHLORIDE, SODIUM BICARBONATE, POTASSIUM CHLORIDE 420; 11.2; 5.72; 1.48 G/4L; G/4L; G/4L; G/4L
4000 POWDER, FOR SOLUTION ORAL ONCE
Status: COMPLETED | OUTPATIENT
Start: 2019-07-26 | End: 2019-07-26

## 2019-07-26 RX ORDER — SODIUM CHLORIDE 0.9 % (FLUSH) 0.9 %
10 SYRINGE (ML) INJECTION PRN
Status: DISCONTINUED | OUTPATIENT
Start: 2019-07-26 | End: 2019-07-28 | Stop reason: HOSPADM

## 2019-07-26 RX ORDER — ALBUTEROL SULFATE 90 UG/1
2 AEROSOL, METERED RESPIRATORY (INHALATION) EVERY 4 HOURS PRN
Status: DISCONTINUED | OUTPATIENT
Start: 2019-07-26 | End: 2019-07-28 | Stop reason: HOSPADM

## 2019-07-26 RX ORDER — POTASSIUM CHLORIDE 7.45 MG/ML
10 INJECTION INTRAVENOUS PRN
Status: DISCONTINUED | OUTPATIENT
Start: 2019-07-26 | End: 2019-07-28 | Stop reason: HOSPADM

## 2019-07-26 RX ADMIN — DULOXETINE HYDROCHLORIDE 60 MG: 60 CAPSULE, DELAYED RELEASE ORAL at 09:33

## 2019-07-26 RX ADMIN — DIPHENHYDRAMINE HYDROCHLORIDE 25 MG: 50 INJECTION, SOLUTION INTRAMUSCULAR; INTRAVENOUS at 20:13

## 2019-07-26 RX ADMIN — HYDROMORPHONE HYDROCHLORIDE 1 MG: 1 INJECTION, SOLUTION INTRAMUSCULAR; INTRAVENOUS; SUBCUTANEOUS at 01:56

## 2019-07-26 RX ADMIN — POLYETHYLENE GLYCOL-3350, SODIUM CHLORIDE, POTASSIUM CHLORIDE AND SODIUM BICARBONATE 4000 ML: 420; 11.2; 5.72; 1.48 POWDER, FOR SOLUTION ORAL at 20:12

## 2019-07-26 RX ADMIN — SODIUM CHLORIDE 1000 ML: 9 INJECTION, SOLUTION INTRAVENOUS at 01:55

## 2019-07-26 RX ADMIN — HYDROMORPHONE HYDROCHLORIDE 0.5 MG: 1 INJECTION, SOLUTION INTRAMUSCULAR; INTRAVENOUS; SUBCUTANEOUS at 13:55

## 2019-07-26 RX ADMIN — PANTOPRAZOLE SODIUM 40 MG: 40 INJECTION, POWDER, FOR SOLUTION INTRAVENOUS at 01:56

## 2019-07-26 RX ADMIN — SODIUM CHLORIDE: 9 INJECTION, SOLUTION INTRAVENOUS at 03:01

## 2019-07-26 RX ADMIN — HYDROMORPHONE HYDROCHLORIDE 0.5 MG: 1 INJECTION, SOLUTION INTRAMUSCULAR; INTRAVENOUS; SUBCUTANEOUS at 22:13

## 2019-07-26 RX ADMIN — ACETAMINOPHEN 650 MG: 325 TABLET ORAL at 22:13

## 2019-07-26 RX ADMIN — HYDROMORPHONE HYDROCHLORIDE 0.5 MG: 1 INJECTION, SOLUTION INTRAMUSCULAR; INTRAVENOUS; SUBCUTANEOUS at 18:12

## 2019-07-26 RX ADMIN — BUPROPION HYDROCHLORIDE 75 MG: 75 TABLET, FILM COATED ORAL at 09:33

## 2019-07-26 RX ADMIN — ONDANSETRON 4 MG: 2 INJECTION INTRAMUSCULAR; INTRAVENOUS at 01:56

## 2019-07-26 RX ADMIN — DIPHENHYDRAMINE HYDROCHLORIDE 25 MG: 50 INJECTION, SOLUTION INTRAMUSCULAR; INTRAVENOUS at 10:58

## 2019-07-26 RX ADMIN — DIPHENHYDRAMINE HYDROCHLORIDE 25 MG: 50 INJECTION, SOLUTION INTRAMUSCULAR; INTRAVENOUS at 04:49

## 2019-07-26 RX ADMIN — FLUTICASONE PROPIONATE 1 SPRAY: 50 SPRAY, METERED NASAL at 09:33

## 2019-07-26 RX ADMIN — HYDROMORPHONE HYDROCHLORIDE 0.5 MG: 1 INJECTION, SOLUTION INTRAMUSCULAR; INTRAVENOUS; SUBCUTANEOUS at 09:33

## 2019-07-26 RX ADMIN — LEVOTHYROXINE SODIUM 25 MCG: 25 TABLET ORAL at 09:33

## 2019-07-26 RX ADMIN — DICYCLOMINE HYDROCHLORIDE 10 MG: 10 CAPSULE ORAL at 14:00

## 2019-07-26 RX ADMIN — SODIUM CHLORIDE: 9 INJECTION, SOLUTION INTRAVENOUS at 18:12

## 2019-07-26 RX ADMIN — DICYCLOMINE HYDROCHLORIDE 10 MG: 10 CAPSULE ORAL at 09:47

## 2019-07-26 RX ADMIN — DICYCLOMINE HYDROCHLORIDE 10 MG: 10 CAPSULE ORAL at 20:11

## 2019-07-26 RX ADMIN — DIPHENHYDRAMINE HYDROCHLORIDE 25 MG: 50 INJECTION, SOLUTION INTRAMUSCULAR; INTRAVENOUS at 13:56

## 2019-07-26 RX ADMIN — HYDROMORPHONE HYDROCHLORIDE 1 MG: 1 INJECTION, SOLUTION INTRAMUSCULAR; INTRAVENOUS; SUBCUTANEOUS at 02:59

## 2019-07-26 RX ADMIN — ENOXAPARIN SODIUM 40 MG: 40 INJECTION SUBCUTANEOUS at 09:33

## 2019-07-26 RX ADMIN — SODIUM CHLORIDE: 9 INJECTION, SOLUTION INTRAVENOUS at 03:45

## 2019-07-26 ASSESSMENT — PAIN SCALES - GENERAL
PAINLEVEL_OUTOF10: 9
PAINLEVEL_OUTOF10: 10
PAINLEVEL_OUTOF10: 9
PAINLEVEL_OUTOF10: 10
PAINLEVEL_OUTOF10: 10
PAINLEVEL_OUTOF10: 6
PAINLEVEL_OUTOF10: 10
PAINLEVEL_OUTOF10: 8
PAINLEVEL_OUTOF10: 9
PAINLEVEL_OUTOF10: 10

## 2019-07-26 ASSESSMENT — PAIN DESCRIPTION - PROGRESSION
CLINICAL_PROGRESSION: NOT CHANGED

## 2019-07-26 ASSESSMENT — PAIN DESCRIPTION - PAIN TYPE
TYPE: ACUTE PAIN

## 2019-07-26 ASSESSMENT — PAIN DESCRIPTION - LOCATION
LOCATION: ABDOMEN

## 2019-07-26 ASSESSMENT — PAIN - FUNCTIONAL ASSESSMENT
PAIN_FUNCTIONAL_ASSESSMENT: ACTIVITIES ARE NOT PREVENTED
PAIN_FUNCTIONAL_ASSESSMENT: ACTIVITIES ARE NOT PREVENTED
PAIN_FUNCTIONAL_ASSESSMENT: 0-10

## 2019-07-26 ASSESSMENT — PAIN DESCRIPTION - DESCRIPTORS
DESCRIPTORS: ACHING;CRAMPING
DESCRIPTORS: CRAMPING
DESCRIPTORS: SHARP
DESCRIPTORS: STABBING

## 2019-07-26 ASSESSMENT — PAIN DESCRIPTION - ORIENTATION
ORIENTATION: RIGHT
ORIENTATION: RIGHT
ORIENTATION: RIGHT;LEFT;LOWER

## 2019-07-26 ASSESSMENT — ENCOUNTER SYMPTOMS
ABDOMINAL PAIN: 1
SORE THROAT: 0
NAUSEA: 1
VOMITING: 1
DIARRHEA: 0
WHEEZING: 0
BACK PAIN: 0
BLOOD IN STOOL: 1
ANAL BLEEDING: 1
COUGH: 0
SHORTNESS OF BREATH: 0

## 2019-07-26 ASSESSMENT — PAIN DESCRIPTION - FREQUENCY
FREQUENCY: CONTINUOUS

## 2019-07-26 ASSESSMENT — PAIN DESCRIPTION - ONSET: ONSET: ON-GOING

## 2019-07-26 NOTE — PROGRESS NOTES
Nutrition Assessment    Type and Reason for Visit: Initial, Positive Nutrition Screen(poor oral intake,unplanned weight loss)    Nutrition Recommendations:   Diet initiation when appropriate, plan ONS start then. Recommend consider a multivitamin. If extended NPO status needed and PN started would recommend: 74 kg to dose, 10 kcals/kg/day to start, 30% lipid kcals, and 1 gram protein/kg/day. Nutrition Assessment:   Pt. nutritionally compromised AEB reports of decline in appetite in the last 1-2 months she feels due to starting wellbutrin. At risk for further nutrition compromise r/t weight loss (some intentional, some not intentional), NPO status, blood stools, continued poor appetite, and need for nutrition support. Will plan ONS start when diet initiated. If extended gut rest needed and PN started would recommend as above. Malnutrition Assessment:  · Malnutrition Status: At risk for malnutrition  · Context: Acute illness or injury    Nutrition Risk Level: High    Nutrient Needs:  · Estimated Daily Total Kcal: 0500-2168 kcals (15-18 kcals/kg/day based on 112 kg)   · Estimated Daily Protein (g): 73 grams (1.2 grams/kg/day based on 61 kg IBW)    Nutrition Diagnosis:   · Problem: Inadequate oral intake  · Etiology: related to Insufficient energy/nutrient consumption, Alteration in GI function(pt report of medication side affect)     Signs and symptoms:  as evidenced by Diet history of poor intake, NPO status due to medical condition    Objective Information:  · Nutrition-Focused Physical Findings: Pt seen reports decline in appetite since she started taking wellbutrin 1-2 months. She has been trying to loose weight as well, decreased portion sizes and increased excercise. She has lost~ 22# in the last 2 months unplanned and planned. Reports cramping, blood stools strarting yesterday. 1 blood stool so far today. Some nausea. Medications: zofran, bentyl, IVF. GI consulted.    · Wound Type: None  · Current Nutrition Therapies:  · Oral Diet Orders: NPO   · Anthropometric Measures:  · Ht: 5' 7\" (170.2 cm)   · Current Body Wt: 246 lb (111.6 kg)(7/26/19 bedscale, no edema)  · Admission Body Wt: 246 lb (111.6 kg)(7/26/19 bedscale, no edema)  · Usual Body Wt: (~ 260# per pt. Per EMR: 5/18/19: 268# actual.  )  · % Weight Change:  ,  8.2% weight loss in the last 2 months unplanned and planned  · Ideal Body Wt: 135 lb (61.2 kg),   · BMI Classification: BMI 35.0 - 39.9 Obese Class II    Nutrition Interventions:   (When diet initiated plan ONS start.)  Continued Inpatient Monitoring, Education Initiated, Coordination of Care(Encouraged oral intake, small frequent meals, consistent carb intake, and good protein sources due to history of hypoglycemia.  )    Nutrition Evaluation:   · Evaluation: Goals set   · Goals: Pt will receive adequate nutrition in 1-4 days.     · Monitoring: Nutrition Progression, Weight, Pertinent Labs, Nausea or Vomiting, Monitor Bowel Function      Electronically signed by Zelalem Tovar RD, LD on 7/26/19 at 1:52 PM    Contact Number: [phone]

## 2019-07-26 NOTE — ED TRIAGE NOTES
Patient presents to the ED ambulatory with complaints of rectal bleeding and abdominal pain that has been ongoing for the last day. Patient was seen in this ED and was diagnosed with ischemic colitis earlier in the day and was instructed to return if symptoms didn't improve. Pt is still having loose bloody stools. Pt denies nausea and vomiting but is having severe right sided abdominal pain. VSS, will continue to monitor.

## 2019-07-26 NOTE — ED PROVIDER NOTES
vomiting (At 09:30 the previous morning). Negative for diarrhea. Genitourinary: Negative for decreased urine volume, difficulty urinating, dysuria, hematuria and urgency. Musculoskeletal: Negative for arthralgias and back pain. Skin: Negative for rash. Allergic/Immunologic: Negative for environmental allergies. Neurological: Positive for headaches. Negative for dizziness, syncope, weakness, light-headedness and numbness. Hematological: Does not bruise/bleed easily. Psychiatric/Behavioral: Negative for dysphoric mood. The patient is nervous/anxious. All other systems reviewed and are negative. PAST MEDICAL HISTORY    has a past medical history of Allergic rhinitis, Anxiety, CAD (coronary artery disease), Depression (emotion), DUB (dysfunctional uterine bleeding), Generalized anxiety disorder, GERD (gastroesophageal reflux disease), Hypertriglyceridemia, Hypoglycemia, Hypothyroid, Movement disorder, Pancreatitis, Psychiatric problem, Scoliosis, and Tobacco abuse. SURGICAL HISTORY      has a past surgical history that includes fracture surgery and knee surgery (08/2017). CURRENT MEDICATIONS       Previous Medications    ACETAMINOPHEN (APAP EXTRA STRENGTH) 500 MG TABLET    Take 1 tablet by mouth every 6 hours as needed for Pain    ALBUTEROL SULFATE HFA (VENTOLIN HFA) 108 (90 BASE) MCG/ACT INHALER    Inhale 2 puffs into the lungs every 4 hours as needed for Wheezing or Shortness of Breath    BUPROPION (WELLBUTRIN) 75 MG TABLET    Take 1 tablet by mouth every morning    CLONAZEPAM (KLONOPIN) 0.5 MG TABLET    Take 1 tablet by mouth 2 times daily as needed for Anxiety for up to 31 days.     DICYCLOMINE (BENTYL) 10 MG CAPSULE    Take 1 capsule by mouth 4 times daily as needed (abdominal cramping)    DULOXETINE (CYMBALTA) 60 MG EXTENDED RELEASE CAPSULE    Take 1 capsule by mouth daily    FLUTICASONE (FLONASE) 50 MCG/ACT NASAL SPRAY    1 spray by Each Nare route daily    HYDROCODONE-ACETAMINOPHEN

## 2019-07-27 LAB
ANION GAP SERPL CALCULATED.3IONS-SCNC: 12 MEQ/L (ref 8–16)
BUN BLDV-MCNC: 8 MG/DL (ref 7–22)
CALCIUM SERPL-MCNC: 8.1 MG/DL (ref 8.5–10.5)
CHLORIDE BLD-SCNC: 105 MEQ/L (ref 98–111)
CO2: 22 MEQ/L (ref 23–33)
CREAT SERPL-MCNC: 0.6 MG/DL (ref 0.4–1.2)
ERYTHROCYTE [DISTWIDTH] IN BLOOD BY AUTOMATED COUNT: 14.5 % (ref 11.5–14.5)
ERYTHROCYTE [DISTWIDTH] IN BLOOD BY AUTOMATED COUNT: 48.6 FL (ref 35–45)
GFR SERPL CREATININE-BSD FRML MDRD: > 90 ML/MIN/1.73M2
GLUCOSE BLD-MCNC: 83 MG/DL (ref 70–108)
HCT VFR BLD CALC: 37.5 % (ref 37–47)
HEMOGLOBIN: 11.4 GM/DL (ref 12–16)
MCH RBC QN AUTO: 27.9 PG (ref 26–33)
MCHC RBC AUTO-ENTMCNC: 30.4 GM/DL (ref 32.2–35.5)
MCV RBC AUTO: 91.9 FL (ref 81–99)
PLATELET # BLD: 219 THOU/MM3 (ref 130–400)
PMV BLD AUTO: 10.2 FL (ref 9.4–12.4)
POTASSIUM REFLEX MAGNESIUM: 3.7 MEQ/L (ref 3.5–5.2)
RBC # BLD: 4.08 MILL/MM3 (ref 4.2–5.4)
SODIUM BLD-SCNC: 139 MEQ/L (ref 135–145)
WBC # BLD: 9.9 THOU/MM3 (ref 4.8–10.8)

## 2019-07-27 PROCEDURE — 36415 COLL VENOUS BLD VENIPUNCTURE: CPT

## 2019-07-27 PROCEDURE — 99153 MOD SED SAME PHYS/QHP EA: CPT | Performed by: INTERNAL MEDICINE

## 2019-07-27 PROCEDURE — 0DBE8ZX EXCISION OF LARGE INTESTINE, VIA NATURAL OR ARTIFICIAL OPENING ENDOSCOPIC, DIAGNOSTIC: ICD-10-PCS | Performed by: INTERNAL MEDICINE

## 2019-07-27 PROCEDURE — 6360000002 HC RX W HCPCS: Performed by: INTERNAL MEDICINE

## 2019-07-27 PROCEDURE — 3609010300 HC COLONOSCOPY W/BIOPSY SINGLE/MULTIPLE: Performed by: INTERNAL MEDICINE

## 2019-07-27 PROCEDURE — G0378 HOSPITAL OBSERVATION PER HR: HCPCS

## 2019-07-27 PROCEDURE — 80048 BASIC METABOLIC PNL TOTAL CA: CPT

## 2019-07-27 PROCEDURE — 2709999900 HC NON-CHARGEABLE SUPPLY: Performed by: INTERNAL MEDICINE

## 2019-07-27 PROCEDURE — 6370000000 HC RX 637 (ALT 250 FOR IP): Performed by: PHYSICIAN ASSISTANT

## 2019-07-27 PROCEDURE — 99231 SBSQ HOSP IP/OBS SF/LOW 25: CPT | Performed by: INTERNAL MEDICINE

## 2019-07-27 PROCEDURE — 85027 COMPLETE CBC AUTOMATED: CPT

## 2019-07-27 PROCEDURE — 2580000003 HC RX 258: Performed by: PHYSICIAN ASSISTANT

## 2019-07-27 PROCEDURE — 88305 TISSUE EXAM BY PATHOLOGIST: CPT

## 2019-07-27 PROCEDURE — 1200000003 HC TELEMETRY R&B

## 2019-07-27 PROCEDURE — 99152 MOD SED SAME PHYS/QHP 5/>YRS: CPT | Performed by: INTERNAL MEDICINE

## 2019-07-27 PROCEDURE — 6360000002 HC RX W HCPCS: Performed by: PHYSICIAN ASSISTANT

## 2019-07-27 RX ORDER — NICOTINE 21 MG/24HR
1 PATCH, TRANSDERMAL 24 HOURS TRANSDERMAL DAILY
Status: DISCONTINUED | OUTPATIENT
Start: 2019-07-27 | End: 2019-07-28 | Stop reason: HOSPADM

## 2019-07-27 RX ORDER — FENTANYL CITRATE 50 UG/ML
INJECTION, SOLUTION INTRAMUSCULAR; INTRAVENOUS PRN
Status: DISCONTINUED | OUTPATIENT
Start: 2019-07-27 | End: 2019-07-27 | Stop reason: ALTCHOICE

## 2019-07-27 RX ORDER — MIDAZOLAM HYDROCHLORIDE 1 MG/ML
INJECTION INTRAMUSCULAR; INTRAVENOUS PRN
Status: DISCONTINUED | OUTPATIENT
Start: 2019-07-27 | End: 2019-07-27 | Stop reason: ALTCHOICE

## 2019-07-27 RX ADMIN — DICYCLOMINE HYDROCHLORIDE 10 MG: 10 CAPSULE ORAL at 02:18

## 2019-07-27 RX ADMIN — DICYCLOMINE HYDROCHLORIDE 10 MG: 10 CAPSULE ORAL at 20:03

## 2019-07-27 RX ADMIN — DIPHENHYDRAMINE HYDROCHLORIDE 25 MG: 50 INJECTION, SOLUTION INTRAMUSCULAR; INTRAVENOUS at 11:10

## 2019-07-27 RX ADMIN — DIPHENHYDRAMINE HYDROCHLORIDE 25 MG: 50 INJECTION, SOLUTION INTRAMUSCULAR; INTRAVENOUS at 04:44

## 2019-07-27 RX ADMIN — Medication 10 ML: at 20:03

## 2019-07-27 RX ADMIN — ACETAMINOPHEN 650 MG: 325 TABLET ORAL at 20:02

## 2019-07-27 RX ADMIN — ACETAMINOPHEN 650 MG: 325 TABLET ORAL at 02:15

## 2019-07-27 RX ADMIN — DIPHENHYDRAMINE HYDROCHLORIDE 25 MG: 50 INJECTION, SOLUTION INTRAMUSCULAR; INTRAVENOUS at 21:18

## 2019-07-27 RX ADMIN — HYDROMORPHONE HYDROCHLORIDE 0.5 MG: 1 INJECTION, SOLUTION INTRAMUSCULAR; INTRAVENOUS; SUBCUTANEOUS at 20:02

## 2019-07-27 RX ADMIN — BUPROPION HYDROCHLORIDE 75 MG: 75 TABLET, FILM COATED ORAL at 11:05

## 2019-07-27 RX ADMIN — DICYCLOMINE HYDROCHLORIDE 10 MG: 10 CAPSULE ORAL at 11:05

## 2019-07-27 RX ADMIN — ACETAMINOPHEN 650 MG: 325 TABLET ORAL at 11:17

## 2019-07-27 RX ADMIN — HYDROMORPHONE HYDROCHLORIDE 0.5 MG: 1 INJECTION, SOLUTION INTRAMUSCULAR; INTRAVENOUS; SUBCUTANEOUS at 06:16

## 2019-07-27 RX ADMIN — SODIUM CHLORIDE: 9 INJECTION, SOLUTION INTRAVENOUS at 04:44

## 2019-07-27 RX ADMIN — ACETAMINOPHEN 650 MG: 325 TABLET ORAL at 15:45

## 2019-07-27 RX ADMIN — DULOXETINE HYDROCHLORIDE 60 MG: 60 CAPSULE, DELAYED RELEASE ORAL at 11:05

## 2019-07-27 RX ADMIN — HYDROMORPHONE HYDROCHLORIDE 0.5 MG: 1 INJECTION, SOLUTION INTRAMUSCULAR; INTRAVENOUS; SUBCUTANEOUS at 15:36

## 2019-07-27 RX ADMIN — ACETAMINOPHEN 650 MG: 325 TABLET ORAL at 06:16

## 2019-07-27 RX ADMIN — Medication 5 MG: at 11:06

## 2019-07-27 RX ADMIN — HYDROMORPHONE HYDROCHLORIDE 0.5 MG: 1 INJECTION, SOLUTION INTRAMUSCULAR; INTRAVENOUS; SUBCUTANEOUS at 11:10

## 2019-07-27 RX ADMIN — HYDROMORPHONE HYDROCHLORIDE 0.5 MG: 1 INJECTION, SOLUTION INTRAMUSCULAR; INTRAVENOUS; SUBCUTANEOUS at 02:13

## 2019-07-27 ASSESSMENT — PAIN - FUNCTIONAL ASSESSMENT
PAIN_FUNCTIONAL_ASSESSMENT: ACTIVITIES ARE NOT PREVENTED
PAIN_FUNCTIONAL_ASSESSMENT: 0-10
PAIN_FUNCTIONAL_ASSESSMENT: 0-10
PAIN_FUNCTIONAL_ASSESSMENT: ACTIVITIES ARE NOT PREVENTED

## 2019-07-27 ASSESSMENT — PAIN DESCRIPTION - PAIN TYPE
TYPE: ACUTE PAIN
TYPE: ACUTE PAIN

## 2019-07-27 ASSESSMENT — PAIN SCALES - GENERAL
PAINLEVEL_OUTOF10: 7
PAINLEVEL_OUTOF10: 9
PAINLEVEL_OUTOF10: 8
PAINLEVEL_OUTOF10: 10
PAINLEVEL_OUTOF10: 8
PAINLEVEL_OUTOF10: 2
PAINLEVEL_OUTOF10: 8
PAINLEVEL_OUTOF10: 0
PAINLEVEL_OUTOF10: 7
PAINLEVEL_OUTOF10: 10

## 2019-07-27 ASSESSMENT — PAIN DESCRIPTION - PROGRESSION
CLINICAL_PROGRESSION: GRADUALLY IMPROVING

## 2019-07-27 ASSESSMENT — PAIN DESCRIPTION - FREQUENCY: FREQUENCY: INTERMITTENT

## 2019-07-27 ASSESSMENT — PAIN DESCRIPTION - DESCRIPTORS
DESCRIPTORS: SHARP
DESCRIPTORS: CRAMPING

## 2019-07-27 ASSESSMENT — PAIN DESCRIPTION - LOCATION
LOCATION: ABDOMEN
LOCATION: ABDOMEN

## 2019-07-27 ASSESSMENT — PAIN DESCRIPTION - ONSET: ONSET: GRADUAL

## 2019-07-27 ASSESSMENT — PAIN DESCRIPTION - ORIENTATION: ORIENTATION: RIGHT;LEFT;LOWER

## 2019-07-27 NOTE — PROCEDURES
region of the splenic  flexure and descending colon. There was complete loss of the normal  vasculature. There was ulceration of the mucosa diffusely in areas. There was no acute or active bleeding. The endoscopic appearance is shown in photographs #4, #5, #6, #7, #8, #9  and also photographs #13, #14, #15, and #16. This consisted of mucosal  erythema, edema, loss of the normal vascular pattern, and areas of  diffuse mucosal denudation and ulceration. Multiple biopsies obtained. All sent for pathology. This was a diffuse process. No focal  abnormalities appreciated within limits of visualization. Again colonic  preparation was poor in areas with retained stool and particulate  material.  The scope was brought back to the rectum where it was  retroflexed and the anorectal junction inspected. Retroflex view was  normal.  The scope was straightened. Air was withdrawn and the scope  was removed. The patient tolerated the procedure well. No apparent  complications. PHOTOGRAPHS:  Photographs #1 and #2 show the rectum. #3, #4, #5, #6,  #7, #8, #9 all show areas of severe colitic mucosa with denuded  ulcerated mucosa circumferentially. Photographs #10, #11, and #12  showed normal mucosa in the right colon. #13, #14, #15, #16 show  colitic mucosa. #17 stool in colitic_____4:02 mucosa. #18, it was left  colon. #19 and #20, rectum. #21 in retroflexion in the rectum. IMPRESSION:  1. Endoscopic appearance, consistent with severe mucosal injury with  diffuse circumferential ulceration and denuded mucosa throughout the  region of the splenic flexure in descending colon. Proximal to this,  mucosa appears unremarkable and the sigmoid and rectum appear spared as  well. Biopsies obtained from the right colon and then also from the  inflamed area. No active bleeding present. No blood in the colon. 2.  Otherwise unremarkable exam to the region near the hepatic flexure.    This is within limits of

## 2019-07-28 VITALS
SYSTOLIC BLOOD PRESSURE: 137 MMHG | HEART RATE: 69 BPM | DIASTOLIC BLOOD PRESSURE: 85 MMHG | HEIGHT: 67 IN | OXYGEN SATURATION: 99 % | WEIGHT: 246 LBS | TEMPERATURE: 98.5 F | BODY MASS INDEX: 38.61 KG/M2 | RESPIRATION RATE: 18 BRPM

## 2019-07-28 PROCEDURE — 96376 TX/PRO/DX INJ SAME DRUG ADON: CPT

## 2019-07-28 PROCEDURE — G0378 HOSPITAL OBSERVATION PER HR: HCPCS

## 2019-07-28 PROCEDURE — 6360000002 HC RX W HCPCS: Performed by: INTERNAL MEDICINE

## 2019-07-28 PROCEDURE — 2580000003 HC RX 258: Performed by: PHYSICIAN ASSISTANT

## 2019-07-28 PROCEDURE — 6370000000 HC RX 637 (ALT 250 FOR IP): Performed by: PHYSICIAN ASSISTANT

## 2019-07-28 PROCEDURE — 99238 HOSP IP/OBS DSCHRG MGMT 30/<: CPT | Performed by: INTERNAL MEDICINE

## 2019-07-28 RX ORDER — NICOTINE 21 MG/24HR
1 PATCH, TRANSDERMAL 24 HOURS TRANSDERMAL DAILY
Qty: 30 PATCH | Refills: 3 | COMMUNITY
Start: 2019-07-29 | End: 2019-08-29

## 2019-07-28 RX ADMIN — ACETAMINOPHEN 650 MG: 325 TABLET ORAL at 04:29

## 2019-07-28 RX ADMIN — Medication 5 MG: at 10:00

## 2019-07-28 RX ADMIN — DICYCLOMINE HYDROCHLORIDE 10 MG: 10 CAPSULE ORAL at 11:26

## 2019-07-28 RX ADMIN — HYDROMORPHONE HYDROCHLORIDE 0.5 MG: 1 INJECTION, SOLUTION INTRAMUSCULAR; INTRAVENOUS; SUBCUTANEOUS at 08:28

## 2019-07-28 RX ADMIN — DICYCLOMINE HYDROCHLORIDE 10 MG: 10 CAPSULE ORAL at 04:31

## 2019-07-28 RX ADMIN — HYDROMORPHONE HYDROCHLORIDE 0.5 MG: 1 INJECTION, SOLUTION INTRAMUSCULAR; INTRAVENOUS; SUBCUTANEOUS at 00:25

## 2019-07-28 RX ADMIN — LEVOTHYROXINE SODIUM 25 MCG: 25 TABLET ORAL at 08:28

## 2019-07-28 RX ADMIN — DULOXETINE HYDROCHLORIDE 60 MG: 60 CAPSULE, DELAYED RELEASE ORAL at 08:28

## 2019-07-28 RX ADMIN — BUPROPION HYDROCHLORIDE 75 MG: 75 TABLET, FILM COATED ORAL at 08:28

## 2019-07-28 RX ADMIN — ACETAMINOPHEN 650 MG: 325 TABLET ORAL at 00:25

## 2019-07-28 RX ADMIN — HYDROMORPHONE HYDROCHLORIDE 0.5 MG: 1 INJECTION, SOLUTION INTRAMUSCULAR; INTRAVENOUS; SUBCUTANEOUS at 04:30

## 2019-07-28 RX ADMIN — ACETAMINOPHEN 650 MG: 325 TABLET ORAL at 08:28

## 2019-07-28 RX ADMIN — Medication 10 ML: at 08:29

## 2019-07-28 ASSESSMENT — PAIN DESCRIPTION - ONSET: ONSET: PROGRESSIVE

## 2019-07-28 ASSESSMENT — PAIN DESCRIPTION - PROGRESSION
CLINICAL_PROGRESSION: NOT CHANGED

## 2019-07-28 ASSESSMENT — PAIN SCALES - GENERAL
PAINLEVEL_OUTOF10: 8
PAINLEVEL_OUTOF10: 7

## 2019-07-28 ASSESSMENT — PAIN DESCRIPTION - LOCATION: LOCATION: ABDOMEN

## 2019-07-28 ASSESSMENT — PAIN DESCRIPTION - DESCRIPTORS: DESCRIPTORS: CRAMPING

## 2019-07-28 ASSESSMENT — PAIN DESCRIPTION - ORIENTATION: ORIENTATION: LEFT;LOWER

## 2019-07-28 ASSESSMENT — PAIN DESCRIPTION - FREQUENCY: FREQUENCY: INTERMITTENT

## 2019-07-28 ASSESSMENT — PAIN DESCRIPTION - PAIN TYPE: TYPE: ACUTE PAIN

## 2019-07-28 ASSESSMENT — PAIN - FUNCTIONAL ASSESSMENT: PAIN_FUNCTIONAL_ASSESSMENT: PREVENTS OR INTERFERES SOME ACTIVE ACTIVITIES AND ADLS

## 2019-07-28 NOTE — DISCHARGE SUMMARY
Hospital Medicine Discharge Summary      Patient Identification:   Indy Malloy   : 1980  MRN: [id number]   Account: [de-identified]      Patient's PCP: SHANA Lizarraga CNP    Admit Date: 2019     Discharge Date:   2019      Admitting Physician: Yusra Ochoa DO     Discharge Physician: Shelby White MD     Discharge Diagnoses: Active Hospital Problems    Diagnosis Date Noted    Colitis [K52.9] 2019    Hematochezia [K92.1]        The patient was seen and examined on day of discharge and this discharge summary is in conjunction with any daily progress note from day of discharge. Hospital Course:   Indy Malloy is a 35 y.o. female admitted to [facility] on 2019 for abdominal pain in LLQ and rectal bleeding with diarrhea. She had been seen in ER prior with above sxs and was given a rx for Bentyl; however she continued with bleeding and sxs and came back to ER and was admitted. She had a panel for pathogens performed which was negative. She was seen by GI and sigmoidoscopy was performed showing mucosal changes suggestive of ischemic colitis. Biopsies were performed. Her sxs improved. Her hemoglobin did not drop significantly. She was discharged home and will see GI for review of her bx. If the bx is suggestive of ischemic colitis a workup for hypercoagulable states might be considered. .              Exam:     Vitals:  Vitals:    19 1958 19 0021 19 0423 19 0821   BP: 128/75 103/61 115/66 137/85   Pulse: 68 84 56 69   Resp: 18 16 16 18   Temp: 98.8 °F (37.1 °C) 98.1 °F (36.7 °C) 97.7 °F (36.5 °C) 98.5 °F (36.9 °C)   TempSrc: Oral Oral Oral Oral   SpO2: 96% 98% 98% 99%   Weight:       Height:         Weight: Weight: 246 lb (111.6 kg)     24 hour intake/output:    Intake/Output Summary (Last 24 hours) at 2019 1010  Last data filed at 2019 0425  Gross per 24 hour   Intake 1732.33 ml   Output -- Net 1732.33 ml         General appearance:  No apparent distress, appears stated age and cooperative. HEENT:  Normal cephalic, atraumatic without obvious deformity. Pupils equal, round, and reactive to light. Extra ocular muscles intact. Conjunctivae/corneas clear. Neck: Supple, with full range of motion. No jugular venous distention. Trachea midline. Respiratory:  Normal respiratory effort. Clear to auscultation, bilaterally without Rales/Wheezes/Rhonchi. Cardiovascular:  Regular rate and rhythm with normal S1/S2 without murmurs, rubs or gallops. Abdomen: Soft, non-tender, non-distended with normal bowel sounds. Musculoskeletal:  No clubbing, cyanosis or edema bilaterally. Full range of motion without deformity. Skin: Skin color, texture, turgor normal.  No rashes or lesions. Neurologic:  Neurovascularly intact without any focal sensory/motor deficits. Cranial nerves: II-XII intact, grossly non-focal.  Psychiatric:  Alert and oriented, thought content appropriate, normal insight  Capillary Refill: Brisk,< 3 seconds   Peripheral Pulses: +2 palpable, equal bilaterally       Labs: For convenience and continuity at follow-up the following most recent labs are provided:      CBC:    Lab Results   Component Value Date    WBC 9.9 07/27/2019    HGB 11.4 07/27/2019    HCT 37.5 07/27/2019     07/27/2019       Renal:    Lab Results   Component Value Date     07/27/2019    K 3.7 07/27/2019     07/27/2019    CO2 22 07/27/2019    BUN 8 07/27/2019    CREATININE 0.6 07/27/2019    CALCIUM 8.1 07/27/2019    PHOS 4.4 11/06/2016         Significant Diagnostic Studies    Radiology:   No orders to display          Consults:     IP CONSULT TO GI    Disposition: Home  Condition at Discharge: Stable    Code Status:  Full Code     Patient Instructions:    Discharge lab work:    Activity: activity as tolerated  Diet: DIET CLEAR LIQUID;      Follow-up visits:   DO Jonathan Petty, Ignacia

## 2019-07-28 NOTE — PROGRESS NOTES
Patient discharged home with S/O and son. She has no questions at this time. She has all paperwork with her. RN called in [facility] to [email or web address] for Dr. Selina Sow MD. No further needs at this time.  Patient walked to lobby with S/O.

## 2019-07-28 NOTE — PLAN OF CARE
Problem: Cardiovascular  Goal: No DVT, peripheral vascular complications  Outcome: Met This Shift  Note:   No signs or symptoms of DVT noted to patient this shift. Negative Arti's sign bialterally. No redness, swelling, or warmth noted to bilateral calves. Patient compliant with wearing SCDs while in bed. Will continue to monitor. Problem: Skin Integrity/Risk  Goal: No skin breakdown during hospitalization  Outcome: Met This Shift  Note:   No skin breakdown noted to patient this shift. Patient is able to reposition herself in bed in order to prevent skin breakdown. Problem: Nutrition  Goal: Optimal nutrition therapy  Outcome: Ongoing  Note:   Patient on clear liquid diet and tolerating well. No nausea/vomiting noted to patient this shift. Problem: Pain Control  Goal: Maintain pain level at or below patient's acceptable level (or 5 if patient is unable to determine acceptable level)  Outcome: Ongoing  Flowsheets (Taken 7/28/2019 0140)  Patient's Stated Pain Goal: 3  Note:   Patient with stated pain goal of 3/10. Patient verbalizing pain to left lower quadrant. Patient taking dialaudid every 4 hours PRN for pain. Patient voices little relief of pain with IV pain medications and periods of rest noted to patient after administering pain medications. Patient is able to reposition herself in bed PRN for comfort. Will continue to assess patient for pain and administer pain medication as appropriate. Problem: GI  Goal: No bowel complications  Outcome: Ongoing  Note:   Patient with diarrhea this shift. Will monitor. Problem: Daily Care:  Goal: Daily care needs are met  Description  Daily care needs are met  Outcome: Ongoing  Note:   Patient requires assistance from staff with ADLs. Patient is able to use call light to request assistance when needed. Will continue to address patient's needs as they arise.         Problem: Discharge Planning:  Goal: Patients continuum of care needs are met  Description  Patients continuum of care needs are met  Outcome: Ongoing  Note:   Patient planning discharge to home. Problem: Musculor/Skeletal Functional Status  Goal: Highest potential functional level  Outcome: Completed  Note:   Patient up ad rasheeda and tolerating well. Care plan reviewed with patient. Patient verbalizes understanding of plan of care and contributes to goal setting.

## 2019-07-28 NOTE — PROGRESS NOTES
of the splenic flexure in descending colon. Proximal to this,  mucosa appears unremarkable and the sigmoid and rectum appear spared as  well. Biopsies obtained from the right colon and then also from the  inflamed area. No active bleeding present. No blood in the colon. 2.  Otherwise unremarkable exam to the region near the hepatic flexure. This is within limits of visualization as colonic preparation was poor  in areas. Assessment:  Acute colitis:colonoscopy demonstrates appearance consistent with severe colonic mucosal injury involving the splenic flexure and descending colon. Endoscopic appearance would be most  suggestive of acute ischemic colitis. Biopsies obtained and hopefully these will help confirm the presumptive diagnosis of acute ischemic colitis. CT scan of the abdomen and pelvis with IV contrast 7/25/2019 consistent with mild pericolonic infiltration with thickening of the transverse and descending colon concerning for colitis. Hematochezia due to colitis. Resolved    Diarrhea. .  GI bacterial stool panel -7/26/2019. Improving. 2 loose stools this morning  Normocytic anemia most recent hemoglobin 11.4 with hematocrit 37.5  Depression, followed by psychiatrist.  COPD. Chronic tobacco abuse. Obesity. History of hypothyroidism. History of hypertension. History of alcohol abuse.     Plan:    Increase diet as tolerated low fiber low residual.  Dicyclomine 10 mg oral 4 times daily   Follow biopsies  Patient can be discharged from GI standpoint asked that she follow-up in the office in 1 week    Case reviewed and impression/plan reviewed in collaboration with Dr. Jarad Mckeon, [facility] for Dr. Jose Antonio Young   7/28/2019

## 2019-07-29 NOTE — CARE COORDINATION
Discharged home with S.O. And son on 07/28/19; no needs.
No  Does patient want to participate in local refill/ meds to beds program?  Yes  Type of Home Care Services:  None  Patient expects to be discharged to:  home  Expected Discharge date:  07/29/19  Follow Up Appointment: Best Day/ Time: Monday AM    Discharge Plan: Met with Radha Bryan this am; she lives home with her boyfriend and their son, uses no DME, does not drive, is able to afford medicines a this time, and is unsure of in-home services or needs at this time. Will follow.      Evaluation: no

## 2019-08-22 ENCOUNTER — TELEPHONE (OUTPATIENT)
Dept: PSYCHIATRY | Age: 39
End: 2019-08-22

## 2019-08-27 ENCOUNTER — OFFICE VISIT (OUTPATIENT)
Dept: PSYCHIATRY | Age: 39
End: 2019-08-27
Payer: COMMERCIAL

## 2019-08-27 DIAGNOSIS — F41.0 PANIC DISORDER: ICD-10-CM

## 2019-08-27 DIAGNOSIS — F43.10 PTSD (POST-TRAUMATIC STRESS DISORDER): ICD-10-CM

## 2019-08-27 DIAGNOSIS — F33.1 MODERATE EPISODE OF RECURRENT MAJOR DEPRESSIVE DISORDER (HCC): Primary | ICD-10-CM

## 2019-08-27 PROCEDURE — 1111F DSCHRG MED/CURRENT MED MERGE: CPT | Performed by: PSYCHIATRY & NEUROLOGY

## 2019-08-27 PROCEDURE — G8417 CALC BMI ABV UP PARAM F/U: HCPCS | Performed by: PSYCHIATRY & NEUROLOGY

## 2019-08-27 PROCEDURE — 4004F PT TOBACCO SCREEN RCVD TLK: CPT | Performed by: PSYCHIATRY & NEUROLOGY

## 2019-08-27 PROCEDURE — 90833 PSYTX W PT W E/M 30 MIN: CPT | Performed by: PSYCHIATRY & NEUROLOGY

## 2019-08-27 PROCEDURE — G8428 CUR MEDS NOT DOCUMENT: HCPCS | Performed by: PSYCHIATRY & NEUROLOGY

## 2019-08-27 PROCEDURE — 99213 OFFICE O/P EST LOW 20 MIN: CPT | Performed by: PSYCHIATRY & NEUROLOGY
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
## 2019-08-29 ENCOUNTER — NURSE ONLY (OUTPATIENT)
Dept: LAB | Age: 39
End: 2019-08-29

## 2019-08-29 ENCOUNTER — OFFICE VISIT (OUTPATIENT)
Dept: FAMILY MEDICINE CLINIC | Age: 39
End: 2019-08-29
Payer: COMMERCIAL

## 2019-08-29 VITALS
SYSTOLIC BLOOD PRESSURE: 123 MMHG | HEART RATE: 78 BPM | HEIGHT: 68 IN | BODY MASS INDEX: 35.55 KG/M2 | RESPIRATION RATE: 12 BRPM | WEIGHT: 234.6 LBS | TEMPERATURE: 98.5 F | DIASTOLIC BLOOD PRESSURE: 82 MMHG | OXYGEN SATURATION: 99 %

## 2019-08-29 DIAGNOSIS — E03.9 HYPOTHYROIDISM, UNSPECIFIED TYPE: ICD-10-CM

## 2019-08-29 DIAGNOSIS — F41.0 PANIC DISORDER: ICD-10-CM

## 2019-08-29 DIAGNOSIS — K52.9 COLITIS: Primary | ICD-10-CM

## 2019-08-29 DIAGNOSIS — K52.9 COLITIS: ICD-10-CM

## 2019-08-29 DIAGNOSIS — R31.9 URINARY TRACT INFECTION WITH HEMATURIA, SITE UNSPECIFIED: ICD-10-CM

## 2019-08-29 DIAGNOSIS — N39.0 URINARY TRACT INFECTION WITH HEMATURIA, SITE UNSPECIFIED: ICD-10-CM

## 2019-08-29 LAB
ALBUMIN SERPL-MCNC: 4.4 G/DL (ref 3.5–5.1)
ALP BLD-CCNC: 84 U/L (ref 38–126)
ALT SERPL-CCNC: 12 U/L (ref 11–66)
ANION GAP SERPL CALCULATED.3IONS-SCNC: 15 MEQ/L (ref 8–16)
AST SERPL-CCNC: 16 U/L (ref 5–40)
AVERAGE GLUCOSE: 102 MG/DL (ref 70–126)
BASOPHILS # BLD: 0.7 %
BASOPHILS ABSOLUTE: 0 THOU/MM3 (ref 0–0.1)
BILIRUB SERPL-MCNC: 0.3 MG/DL (ref 0.3–1.2)
BILIRUBIN URINE: NEGATIVE
BLOOD URINE, POC: ABNORMAL
BUN BLDV-MCNC: 11 MG/DL (ref 7–22)
CALCIUM SERPL-MCNC: 9.8 MG/DL (ref 8.5–10.5)
CHARACTER, URINE: ABNORMAL
CHLORIDE BLD-SCNC: 102 MEQ/L (ref 98–111)
CHOLESTEROL, TOTAL: 197 MG/DL (ref 100–199)
CO2: 22 MEQ/L (ref 23–33)
COLOR, URINE: YELLOW
CREAT SERPL-MCNC: 0.9 MG/DL (ref 0.4–1.2)
EOSINOPHIL # BLD: 1.5 %
EOSINOPHILS ABSOLUTE: 0.1 THOU/MM3 (ref 0–0.4)
ERYTHROCYTE [DISTWIDTH] IN BLOOD BY AUTOMATED COUNT: 14.3 % (ref 11.5–14.5)
ERYTHROCYTE [DISTWIDTH] IN BLOOD BY AUTOMATED COUNT: 46.4 FL (ref 35–45)
GFR SERPL CREATININE-BSD FRML MDRD: 69 ML/MIN/1.73M2
GLUCOSE BLD-MCNC: 86 MG/DL (ref 70–108)
GLUCOSE URINE: NEGATIVE MG/DL
HBA1C MFR BLD: 5.4 % (ref 4.4–6.4)
HCT VFR BLD CALC: 46.9 % (ref 37–47)
HDLC SERPL-MCNC: 34 MG/DL
HEMOGLOBIN: 14.7 GM/DL (ref 12–16)
IMMATURE GRANS (ABS): 0.01 THOU/MM3 (ref 0–0.07)
IMMATURE GRANULOCYTES: 0 %
KETONES, URINE: NEGATIVE
LDL CHOLESTEROL CALCULATED: 125 MG/DL
LEUKOCYTE CLUMPS, URINE: ABNORMAL
LYMPHOCYTES # BLD: 30.7 %
LYMPHOCYTES ABSOLUTE: 2.1 THOU/MM3 (ref 1–4.8)
MCH RBC QN AUTO: 28.1 PG (ref 26–33)
MCHC RBC AUTO-ENTMCNC: 31.3 GM/DL (ref 32.2–35.5)
MCV RBC AUTO: 89.5 FL (ref 81–99)
MONOCYTES # BLD: 5.2 %
MONOCYTES ABSOLUTE: 0.3 THOU/MM3 (ref 0.4–1.3)
NITRITE, URINE: POSITIVE
NUCLEATED RED BLOOD CELLS: 0 /100 WBC
PH, URINE: 6.5 (ref 5–9)
PLATELET # BLD: 296 THOU/MM3 (ref 130–400)
PMV BLD AUTO: 10.5 FL (ref 9.4–12.4)
POTASSIUM SERPL-SCNC: 4 MEQ/L (ref 3.5–5.2)
PROTEIN, URINE: NEGATIVE MG/DL
RBC # BLD: 5.24 MILL/MM3 (ref 4.2–5.4)
SEG NEUTROPHILS: 61.8 %
SEGMENTED NEUTROPHILS ABSOLUTE COUNT: 4.1 THOU/MM3 (ref 1.8–7.7)
SODIUM BLD-SCNC: 139 MEQ/L (ref 135–145)
SPECIFIC GRAVITY, URINE: 1.02 (ref 1–1.03)
TOTAL PROTEIN: 7.4 G/DL (ref 6.1–8)
TRIGL SERPL-MCNC: 189 MG/DL (ref 0–199)
TSH SERPL DL<=0.05 MIU/L-ACNC: 2.29 UIU/ML (ref 0.4–4.2)
UROBILINOGEN, URINE: 0.2 EU/DL (ref 0–1)
WBC # BLD: 6.7 THOU/MM3 (ref 4.8–10.8)

## 2019-08-29 PROCEDURE — 99203 OFFICE O/P NEW LOW 30 MIN: CPT | Performed by: FAMILY MEDICINE

## 2019-08-29 PROCEDURE — 4004F PT TOBACCO SCREEN RCVD TLK: CPT | Performed by: FAMILY MEDICINE

## 2019-08-29 PROCEDURE — G8427 DOCREV CUR MEDS BY ELIG CLIN: HCPCS | Performed by: FAMILY MEDICINE

## 2019-08-29 PROCEDURE — G8417 CALC BMI ABV UP PARAM F/U: HCPCS | Performed by: FAMILY MEDICINE

## 2019-08-29 RX ORDER — METRONIDAZOLE 500 MG/1
500 TABLET ORAL 3 TIMES DAILY
Qty: 21 TABLET | Refills: 0 | Status: SHIPPED | OUTPATIENT
Start: 2019-08-29 | End: 2019-09-05

## 2019-08-29 RX ORDER — CIPROFLOXACIN 500 MG/1
500 TABLET, FILM COATED ORAL 2 TIMES DAILY
Qty: 14 TABLET | Refills: 0 | Status: SHIPPED | OUTPATIENT
Start: 2019-08-29 | End: 2019-09-05

## 2019-08-29 RX ORDER — FLUCONAZOLE 150 MG/1
150 TABLET ORAL ONCE
Qty: 3 TABLET | Refills: 0 | Status: SHIPPED | OUTPATIENT
Start: 2019-08-29 | End: 2019-08-29

## 2019-08-29 RX ORDER — ONDANSETRON 4 MG/1
4 TABLET, ORALLY DISINTEGRATING ORAL EVERY 8 HOURS PRN
Qty: 15 TABLET | Refills: 0 | Status: SHIPPED | OUTPATIENT
Start: 2019-08-29 | End: 2021-01-04

## 2019-08-29 RX ORDER — ALBUTEROL SULFATE 90 UG/1
2 AEROSOL, METERED RESPIRATORY (INHALATION) EVERY 4 HOURS PRN
Qty: 1 INHALER | Refills: 0 | Status: SHIPPED | OUTPATIENT
Start: 2019-08-29 | End: 2019-10-25

## 2019-08-29 RX ORDER — BUPROPION HYDROCHLORIDE 75 MG/1
75 TABLET ORAL EVERY MORNING
Qty: 30 TABLET | Refills: 3 | OUTPATIENT
Start: 2019-08-29

## 2019-08-29 ASSESSMENT — ENCOUNTER SYMPTOMS
SHORTNESS OF BREATH: 0
VOMITING: 0
NAUSEA: 1
DIARRHEA: 0
COUGH: 0
WHEEZING: 0
BACK PAIN: 0
ABDOMINAL PAIN: 1
CONSTIPATION: 1
RHINORRHEA: 0
BLOOD IN STOOL: 0

## 2019-08-29 NOTE — PROGRESS NOTES
2019    Indy Malloy (:  1980) is a 35 y.o. female, here for evaluation of the following medical concerns:    H/o colitis. Admitted about 1 month ago with abd pain similar to previous episodes of pancreatitis. Went to GI once. Stool log and MoM prescribed and f/u set for2 months. Still having  Nausea, not vomiting, but not eating very much. zofran helps with this. BMs are soft. No more blood in the stool. Ischemic changes of the colon seen on bx results when she was in the hospital.      increased frequency of urination. 1.5 wks of dysuria. It's been 2-3 yrs since her last flair of pancreatitis.  etoh was the trigger. Quit drinking for the most part. No problems since then. Controlled anxiety and depression. Follows with Psychiatry. Review of Systems   Constitutional: Negative for chills and fever. HENT: Negative for ear pain and rhinorrhea. Respiratory: Negative for cough and shortness of breath. Cardiovascular: Negative for chest pain and palpitations. Gastrointestinal: Positive for nausea. Negative for blood in stool, diarrhea and vomiting. Endocrine: Negative for cold intolerance and polyuria. Genitourinary: Positive for frequency. Negative for dysuria. Musculoskeletal: Negative for arthralgias and back pain. Skin: Negative for rash and wound. Neurological: Negative for weakness and numbness. Hematological: Negative for adenopathy. Does not bruise/bleed easily. Psychiatric/Behavioral: Negative for sleep disturbance. The patient is not nervous/anxious. Prior to Visit Medications    Medication Sig Taking?  Authorizing Provider   albuterol sulfate HFA (VENTOLIN HFA) 108 (90 Base) MCG/ACT inhaler Inhale 2 puffs into the lungs every 4 hours as needed for Wheezing or Shortness of Breath Yes Lavelle Truong, DO   ondansetron (ZOFRAN ODT) 4 MG disintegrating tablet Take 1 tablet by mouth every 8 hours as needed for Nausea or Vomiting Yes
Psychiatric problem     Scoliosis     Tobacco abuse       Past Surgical History:   Procedure Laterality Date    COLONOSCOPY N/A 7/27/2019    COLONOSCOPY WITH BIOPSY performed by Brooke Lopez MD at [facility]   [address] SURGERY  08/2017     Family History   Problem Relation Age of Onset    Diabetes Mother     High Blood Pressure Mother     Thyroid Disease Mother     Heart Disease Father     Diabetes Father     Cancer Sister         cervical     Social History     Tobacco Use    Smoking status: Current Every Day Smoker     Packs/day: 1.00     Years: 15.00     Pack years: 15.00     Types: Cigarettes    Smokeless tobacco: Never Used   Substance Use Topics    Alcohol use: Yes     Comment: social drinking      Current Outpatient Medications   Medication Sig Dispense Refill    ondansetron (ZOFRAN ODT) 4 MG disintegrating tablet Take 1 tablet by mouth every 8 hours as needed for Nausea or Vomiting 15 tablet 0    buPROPion (WELLBUTRIN) 75 MG tablet Take 1 tablet by mouth every morning 30 tablet 3    DULoxetine (CYMBALTA) 60 MG extended release capsule Take 1 capsule by mouth daily 30 capsule 3    clonazePAM (KLONOPIN) 0.5 MG tablet Take 1 tablet by mouth 2 times daily as needed for Anxiety for up to 31 days. 60 tablet 1    prazosin (MINIPRESS) 1 MG capsule Take 3 capsules by mouth nightly 90 capsule 1    norethindrone (AYGESTIN) 5 MG tablet Take 5 mg by mouth daily      fluticasone (FLONASE) 50 MCG/ACT nasal spray 1 spray by Each Nare route daily      acetaminophen (APAP EXTRA STRENGTH) 500 MG tablet Take 1 tablet by mouth every 6 hours as needed for Pain 120 tablet 3    levothyroxine (SYNTHROID) 25 MCG tablet Take 25 mcg by mouth Daily.       dicyclomine (BENTYL) 10 MG capsule Take 1 capsule by mouth 4 times daily as needed (abdominal cramping) (Patient not taking: Reported on 8/29/2019) 20 capsule 0    albuterol sulfate HFA (VENTOLIN HFA) 108 (90 Base) MCG/ACT inhaler

## 2019-08-31 LAB
ORGANISM: ABNORMAL
URINE CULTURE, ROUTINE: ABNORMAL

## 2019-09-01 ENCOUNTER — PATIENT MESSAGE (OUTPATIENT)
Dept: FAMILY MEDICINE CLINIC | Age: 39
End: 2019-09-01

## 2019-09-02 RX ORDER — CLONAZEPAM 0.5 MG/1
0.5 TABLET ORAL 2 TIMES DAILY PRN
Qty: 60 TABLET | Refills: 1 | Status: SHIPPED | OUTPATIENT
Start: 2019-09-02 | End: 2019-10-23 | Stop reason: SDUPTHER

## 2019-09-04 RX ORDER — LEVOTHYROXINE SODIUM 0.03 MG/1
25 TABLET ORAL DAILY
Qty: 30 TABLET | Refills: 5 | Status: SHIPPED | OUTPATIENT
Start: 2019-09-04 | End: 2019-12-05 | Stop reason: SDUPTHER

## 2019-09-19 RX ORDER — PRAZOSIN HYDROCHLORIDE 1 MG/1
3 CAPSULE ORAL NIGHTLY
Qty: 90 CAPSULE | Refills: 1 | Status: SHIPPED | OUTPATIENT
Start: 2019-09-19 | End: 2019-10-23 | Stop reason: SDUPTHER

## 2019-10-17 ENCOUNTER — TELEPHONE (OUTPATIENT)
Dept: PSYCHIATRY | Age: 39
End: 2019-10-17

## 2019-10-23 ENCOUNTER — OFFICE VISIT (OUTPATIENT)
Dept: PSYCHIATRY | Age: 39
End: 2019-10-23
Payer: COMMERCIAL

## 2019-10-23 DIAGNOSIS — F33.2 SEVERE EPISODE OF RECURRENT MAJOR DEPRESSIVE DISORDER, WITHOUT PSYCHOTIC FEATURES (HCC): Primary | ICD-10-CM

## 2019-10-23 DIAGNOSIS — F41.0 PANIC DISORDER: ICD-10-CM

## 2019-10-23 DIAGNOSIS — F43.10 PTSD (POST-TRAUMATIC STRESS DISORDER): ICD-10-CM

## 2019-10-23 PROCEDURE — 90833 PSYTX W PT W E/M 30 MIN: CPT | Performed by: PSYCHIATRY & NEUROLOGY

## 2019-10-23 PROCEDURE — G8484 FLU IMMUNIZE NO ADMIN: HCPCS | Performed by: PSYCHIATRY & NEUROLOGY

## 2019-10-23 PROCEDURE — G8417 CALC BMI ABV UP PARAM F/U: HCPCS | Performed by: PSYCHIATRY & NEUROLOGY

## 2019-10-23 PROCEDURE — G8428 CUR MEDS NOT DOCUMENT: HCPCS | Performed by: PSYCHIATRY & NEUROLOGY

## 2019-10-23 PROCEDURE — 99213 OFFICE O/P EST LOW 20 MIN: CPT | Performed by: PSYCHIATRY & NEUROLOGY

## 2019-10-23 PROCEDURE — 4004F PT TOBACCO SCREEN RCVD TLK: CPT | Performed by: PSYCHIATRY & NEUROLOGY

## 2019-10-23 RX ORDER — DULOXETIN HYDROCHLORIDE 60 MG/1
60 CAPSULE, DELAYED RELEASE ORAL DAILY
Qty: 30 CAPSULE | Refills: 3 | Status: SHIPPED | OUTPATIENT
Start: 2019-10-23 | End: 2020-01-16 | Stop reason: SDUPTHER

## 2019-10-23 RX ORDER — BUPROPION HYDROCHLORIDE 150 MG/1
150 TABLET ORAL EVERY MORNING
Qty: 30 TABLET | Refills: 3 | Status: SHIPPED | OUTPATIENT
Start: 2019-10-23 | End: 2020-01-16 | Stop reason: SDUPTHER

## 2019-10-23 RX ORDER — PRAZOSIN HYDROCHLORIDE 1 MG/1
3 CAPSULE ORAL NIGHTLY
Qty: 90 CAPSULE | Refills: 3 | Status: SHIPPED | OUTPATIENT
Start: 2019-10-23 | End: 2020-05-19 | Stop reason: SDUPTHER

## 2019-10-23 RX ORDER — CLONAZEPAM 0.5 MG/1
0.5 TABLET ORAL 2 TIMES DAILY PRN
Qty: 60 TABLET | Refills: 1 | Status: SHIPPED | OUTPATIENT
Start: 2019-10-23 | End: 2019-12-09 | Stop reason: SDUPTHER

## 2019-10-25 ENCOUNTER — APPOINTMENT (OUTPATIENT)
Dept: GENERAL RADIOLOGY | Age: 39
End: 2019-10-25
Payer: COMMERCIAL

## 2019-10-25 ENCOUNTER — HOSPITAL ENCOUNTER (EMERGENCY)
Age: 39
Discharge: HOME | End: 2019-10-25
Payer: COMMERCIAL

## 2019-10-25 VITALS
RESPIRATION RATE: 16 BRPM | TEMPERATURE: 98 F | DIASTOLIC BLOOD PRESSURE: 88 MMHG | HEIGHT: 67 IN | OXYGEN SATURATION: 100 % | HEART RATE: 80 BPM | SYSTOLIC BLOOD PRESSURE: 109 MMHG | BODY MASS INDEX: 34.53 KG/M2 | WEIGHT: 220 LBS

## 2019-10-25 DIAGNOSIS — M54.6 ACUTE RIGHT-SIDED THORACIC BACK PAIN: ICD-10-CM

## 2019-10-25 DIAGNOSIS — J40 BRONCHITIS: Primary | ICD-10-CM

## 2019-10-25 LAB
ALBUMIN SERPL-MCNC: 4.1 G/DL (ref 3.5–5.1)
ALP BLD-CCNC: 74 U/L (ref 38–126)
ALT SERPL-CCNC: 11 U/L (ref 11–66)
ANION GAP SERPL CALCULATED.3IONS-SCNC: 11 MEQ/L (ref 8–16)
AST SERPL-CCNC: 14 U/L (ref 5–40)
BASOPHILS # BLD: 0.7 %
BASOPHILS ABSOLUTE: 0 THOU/MM3 (ref 0–0.1)
BILIRUB SERPL-MCNC: 0.2 MG/DL (ref 0.3–1.2)
BUN BLDV-MCNC: 9 MG/DL (ref 7–22)
CALCIUM SERPL-MCNC: 9.5 MG/DL (ref 8.5–10.5)
CHLORIDE BLD-SCNC: 104 MEQ/L (ref 98–111)
CO2: 25 MEQ/L (ref 23–33)
CREAT SERPL-MCNC: 0.8 MG/DL (ref 0.4–1.2)
EKG ATRIAL RATE: 56 BPM
EKG P AXIS: 68 DEGREES
EKG P-R INTERVAL: 124 MS
EKG Q-T INTERVAL: 418 MS
EKG QRS DURATION: 86 MS
EKG QTC CALCULATION (BAZETT): 403 MS
EKG R AXIS: 55 DEGREES
EKG T AXIS: 49 DEGREES
EKG VENTRICULAR RATE: 56 BPM
EOSINOPHIL # BLD: 1.8 %
EOSINOPHILS ABSOLUTE: 0.1 THOU/MM3 (ref 0–0.4)
ERYTHROCYTE [DISTWIDTH] IN BLOOD BY AUTOMATED COUNT: 14.1 % (ref 11.5–14.5)
ERYTHROCYTE [DISTWIDTH] IN BLOOD BY AUTOMATED COUNT: 45.9 FL (ref 35–45)
FLU A ANTIGEN: NEGATIVE
FLU B ANTIGEN: NEGATIVE
GFR SERPL CREATININE-BSD FRML MDRD: 79 ML/MIN/1.73M2
GLUCOSE BLD-MCNC: 88 MG/DL (ref 70–108)
GROUP A STREP CULTURE, REFLEX: NEGATIVE
HCT VFR BLD CALC: 46 % (ref 37–47)
HEMOGLOBIN: 14.6 GM/DL (ref 12–16)
IMMATURE GRANS (ABS): 0.01 THOU/MM3 (ref 0–0.07)
IMMATURE GRANULOCYTES: 0.2 %
LYMPHOCYTES # BLD: 27.5 %
LYMPHOCYTES ABSOLUTE: 1.5 THOU/MM3 (ref 1–4.8)
MCH RBC QN AUTO: 28.3 PG (ref 26–33)
MCHC RBC AUTO-ENTMCNC: 31.7 GM/DL (ref 32.2–35.5)
MCV RBC AUTO: 89.3 FL (ref 81–99)
MONOCYTES # BLD: 4.9 %
MONOCYTES ABSOLUTE: 0.3 THOU/MM3 (ref 0.4–1.3)
NUCLEATED RED BLOOD CELLS: 0 /100 WBC
OSMOLALITY CALCULATION: 277.5 MOSMOL/KG (ref 275–300)
PLATELET # BLD: 276 THOU/MM3 (ref 130–400)
PMV BLD AUTO: 9.5 FL (ref 9.4–12.4)
POTASSIUM SERPL-SCNC: 3.7 MEQ/L (ref 3.5–5.2)
PROCALCITONIN: 0.02 NG/ML (ref 0.01–0.09)
RBC # BLD: 5.15 MILL/MM3 (ref 4.2–5.4)
REFLEX THROAT C + S: NORMAL
SEG NEUTROPHILS: 64.9 %
SEGMENTED NEUTROPHILS ABSOLUTE COUNT: 3.6 THOU/MM3 (ref 1.8–7.7)
SODIUM BLD-SCNC: 140 MEQ/L (ref 135–145)
TOTAL PROTEIN: 7 G/DL (ref 6.1–8)
TROPONIN T: < 0.01 NG/ML
WBC # BLD: 5.5 THOU/MM3 (ref 4.8–10.8)

## 2019-10-25 PROCEDURE — 87880 STREP A ASSAY W/OPTIC: CPT

## 2019-10-25 PROCEDURE — 84145 PROCALCITONIN (PCT): CPT

## 2019-10-25 PROCEDURE — 80053 COMPREHEN METABOLIC PANEL: CPT

## 2019-10-25 PROCEDURE — 96372 THER/PROPH/DIAG INJ SC/IM: CPT

## 2019-10-25 PROCEDURE — 36415 COLL VENOUS BLD VENIPUNCTURE: CPT

## 2019-10-25 PROCEDURE — 87070 CULTURE OTHR SPECIMN AEROBIC: CPT

## 2019-10-25 PROCEDURE — 87804 INFLUENZA ASSAY W/OPTIC: CPT

## 2019-10-25 PROCEDURE — 71046 X-RAY EXAM CHEST 2 VIEWS: CPT

## 2019-10-25 PROCEDURE — 6370000000 HC RX 637 (ALT 250 FOR IP): Performed by: PHYSICIAN ASSISTANT

## 2019-10-25 PROCEDURE — 99283 EMERGENCY DEPT VISIT LOW MDM: CPT

## 2019-10-25 PROCEDURE — 84484 ASSAY OF TROPONIN QUANT: CPT

## 2019-10-25 PROCEDURE — 85025 COMPLETE CBC W/AUTO DIFF WBC: CPT

## 2019-10-25 PROCEDURE — 2709999900 HC NON-CHARGEABLE SUPPLY

## 2019-10-25 PROCEDURE — 6360000002 HC RX W HCPCS: Performed by: PHYSICIAN ASSISTANT

## 2019-10-25 PROCEDURE — 93005 ELECTROCARDIOGRAM TRACING: CPT | Performed by: PHYSICIAN ASSISTANT

## 2019-10-25 PROCEDURE — 72072 X-RAY EXAM THORAC SPINE 3VWS: CPT

## 2019-10-25 PROCEDURE — 94640 AIRWAY INHALATION TREATMENT: CPT

## 2019-10-25 RX ORDER — GUAIFENESIN 100 MG/5ML
200 SOLUTION ORAL EVERY 4 HOURS PRN
Qty: 1200 ML | Refills: 0 | Status: SHIPPED | OUTPATIENT
Start: 2019-10-25 | End: 2020-06-29 | Stop reason: SDUPTHER

## 2019-10-25 RX ORDER — AZITHROMYCIN 250 MG/1
TABLET, FILM COATED ORAL
Qty: 1 PACKET | Refills: 0 | Status: SHIPPED | OUTPATIENT
Start: 2019-10-25 | End: 2019-10-29

## 2019-10-25 RX ORDER — ONDANSETRON 4 MG/1
4 TABLET, ORALLY DISINTEGRATING ORAL ONCE
Status: COMPLETED | OUTPATIENT
Start: 2019-10-25 | End: 2019-10-25

## 2019-10-25 RX ORDER — IBUPROFEN 600 MG/1
600 TABLET ORAL EVERY 6 HOURS PRN
Qty: 30 TABLET | Refills: 0 | Status: SHIPPED | OUTPATIENT
Start: 2019-10-25 | End: 2020-02-20 | Stop reason: ALTCHOICE

## 2019-10-25 RX ORDER — PREDNISONE 20 MG/1
20 TABLET ORAL 2 TIMES DAILY
Qty: 10 TABLET | Refills: 0 | Status: SHIPPED | OUTPATIENT
Start: 2019-10-25 | End: 2019-10-30

## 2019-10-25 RX ORDER — ALBUTEROL SULFATE 90 UG/1
2 AEROSOL, METERED RESPIRATORY (INHALATION) EVERY 4 HOURS PRN
Qty: 1 INHALER | Refills: 0 | Status: SHIPPED | OUTPATIENT
Start: 2019-10-25 | End: 2020-09-08

## 2019-10-25 RX ORDER — METHYLPREDNISOLONE SODIUM SUCCINATE 125 MG/2ML
80 INJECTION, POWDER, LYOPHILIZED, FOR SOLUTION INTRAMUSCULAR; INTRAVENOUS ONCE
Status: COMPLETED | OUTPATIENT
Start: 2019-10-25 | End: 2019-10-25

## 2019-10-25 RX ORDER — KETOROLAC TROMETHAMINE 30 MG/ML
30 INJECTION, SOLUTION INTRAMUSCULAR; INTRAVENOUS ONCE
Status: COMPLETED | OUTPATIENT
Start: 2019-10-25 | End: 2019-10-25

## 2019-10-25 RX ORDER — IPRATROPIUM BROMIDE AND ALBUTEROL SULFATE 2.5; .5 MG/3ML; MG/3ML
1 SOLUTION RESPIRATORY (INHALATION) ONCE
Status: COMPLETED | OUTPATIENT
Start: 2019-10-25 | End: 2019-10-25

## 2019-10-25 RX ADMIN — METHYLPREDNISOLONE SODIUM SUCCINATE 80 MG: 125 INJECTION, POWDER, FOR SOLUTION INTRAMUSCULAR; INTRAVENOUS at 15:20

## 2019-10-25 RX ADMIN — IPRATROPIUM BROMIDE AND ALBUTEROL SULFATE 1 AMPULE: .5; 3 SOLUTION RESPIRATORY (INHALATION) at 15:18

## 2019-10-25 RX ADMIN — KETOROLAC TROMETHAMINE 30 MG: 30 INJECTION, SOLUTION INTRAMUSCULAR at 15:20

## 2019-10-25 RX ADMIN — ONDANSETRON 4 MG: 4 TABLET, ORALLY DISINTEGRATING ORAL at 15:19

## 2019-10-25 ASSESSMENT — ENCOUNTER SYMPTOMS
ABDOMINAL PAIN: 1
RHINORRHEA: 0
SHORTNESS OF BREATH: 0
WHEEZING: 0
NAUSEA: 1
SINUS PAIN: 0
VOMITING: 1
CONSTIPATION: 1
COUGH: 0
SINUS PRESSURE: 0
COLOR CHANGE: 0
DIARRHEA: 1
SORE THROAT: 0
BACK PAIN: 0

## 2019-10-25 ASSESSMENT — PAIN SCALES - GENERAL: PAINLEVEL_OUTOF10: 4

## 2019-10-28 LAB — THROAT/NOSE CULTURE: NORMAL

## 2019-12-02 RX ORDER — ALBUTEROL SULFATE 90 UG/1
AEROSOL, METERED RESPIRATORY (INHALATION)
Qty: 1 INHALER | Refills: 2 | Status: SHIPPED | OUTPATIENT
Start: 2019-12-02 | End: 2019-12-05 | Stop reason: SDUPTHER

## 2019-12-05 ENCOUNTER — OFFICE VISIT (OUTPATIENT)
Dept: FAMILY MEDICINE CLINIC | Age: 39
End: 2019-12-05
Payer: COMMERCIAL

## 2019-12-05 VITALS
HEIGHT: 67 IN | DIASTOLIC BLOOD PRESSURE: 84 MMHG | WEIGHT: 210.8 LBS | SYSTOLIC BLOOD PRESSURE: 122 MMHG | HEART RATE: 76 BPM | RESPIRATION RATE: 12 BRPM | BODY MASS INDEX: 33.09 KG/M2 | OXYGEN SATURATION: 98 % | TEMPERATURE: 98.2 F

## 2019-12-05 DIAGNOSIS — J45.40 MODERATE PERSISTENT REACTIVE AIRWAY DISEASE WITHOUT COMPLICATION: ICD-10-CM

## 2019-12-05 DIAGNOSIS — J31.0 NONALLERGIC RHINITIS: ICD-10-CM

## 2019-12-05 DIAGNOSIS — E03.9 HYPOTHYROIDISM, UNSPECIFIED TYPE: ICD-10-CM

## 2019-12-05 DIAGNOSIS — K52.9 COLITIS: Primary | ICD-10-CM

## 2019-12-05 PROCEDURE — 94640 AIRWAY INHALATION TREATMENT: CPT | Performed by: FAMILY MEDICINE

## 2019-12-05 PROCEDURE — G8417 CALC BMI ABV UP PARAM F/U: HCPCS | Performed by: FAMILY MEDICINE

## 2019-12-05 PROCEDURE — G8427 DOCREV CUR MEDS BY ELIG CLIN: HCPCS | Performed by: FAMILY MEDICINE

## 2019-12-05 PROCEDURE — 99214 OFFICE O/P EST MOD 30 MIN: CPT | Performed by: FAMILY MEDICINE

## 2019-12-05 PROCEDURE — G8484 FLU IMMUNIZE NO ADMIN: HCPCS | Performed by: FAMILY MEDICINE

## 2019-12-05 PROCEDURE — 4004F PT TOBACCO SCREEN RCVD TLK: CPT | Performed by: FAMILY MEDICINE

## 2019-12-05 RX ORDER — ALBUTEROL SULFATE 2.5 MG/3ML
2.5 SOLUTION RESPIRATORY (INHALATION) ONCE
Status: COMPLETED | OUTPATIENT
Start: 2019-12-05 | End: 2019-12-05

## 2019-12-05 RX ORDER — IPRATROPIUM BROMIDE 42 UG/1
2 SPRAY, METERED NASAL 3 TIMES DAILY
Qty: 1 BOTTLE | Refills: 3 | Status: SHIPPED | OUTPATIENT
Start: 2019-12-05 | End: 2020-06-29 | Stop reason: SDUPTHER

## 2019-12-05 RX ORDER — LEVOTHYROXINE SODIUM 0.05 MG/1
50 TABLET ORAL DAILY
Qty: 90 TABLET | Refills: 1 | Status: SHIPPED | OUTPATIENT
Start: 2019-12-05 | End: 2020-05-21

## 2019-12-05 RX ORDER — FLUTICASONE PROPIONATE 220 UG/1
2 AEROSOL, METERED RESPIRATORY (INHALATION) 2 TIMES DAILY
Qty: 1 INHALER | Refills: 3 | Status: SHIPPED | OUTPATIENT
Start: 2019-12-05 | End: 2020-02-20

## 2019-12-05 RX ADMIN — ALBUTEROL SULFATE 2.5 MG: 2.5 SOLUTION RESPIRATORY (INHALATION) at 12:06

## 2019-12-09 ENCOUNTER — OFFICE VISIT (OUTPATIENT)
Dept: PSYCHIATRY | Age: 39
End: 2019-12-09
Payer: COMMERCIAL

## 2019-12-09 DIAGNOSIS — F43.10 PTSD (POST-TRAUMATIC STRESS DISORDER): ICD-10-CM

## 2019-12-09 DIAGNOSIS — F41.0 PANIC DISORDER: ICD-10-CM

## 2019-12-09 DIAGNOSIS — F33.1 MODERATE EPISODE OF RECURRENT MAJOR DEPRESSIVE DISORDER (HCC): Primary | ICD-10-CM

## 2019-12-09 PROCEDURE — G8428 CUR MEDS NOT DOCUMENT: HCPCS | Performed by: PSYCHIATRY & NEUROLOGY

## 2019-12-09 PROCEDURE — 4004F PT TOBACCO SCREEN RCVD TLK: CPT | Performed by: PSYCHIATRY & NEUROLOGY

## 2019-12-09 PROCEDURE — 99213 OFFICE O/P EST LOW 20 MIN: CPT | Performed by: PSYCHIATRY & NEUROLOGY

## 2019-12-09 PROCEDURE — G8417 CALC BMI ABV UP PARAM F/U: HCPCS | Performed by: PSYCHIATRY & NEUROLOGY

## 2019-12-09 PROCEDURE — 90833 PSYTX W PT W E/M 30 MIN: CPT | Performed by: PSYCHIATRY & NEUROLOGY

## 2019-12-09 PROCEDURE — G8484 FLU IMMUNIZE NO ADMIN: HCPCS | Performed by: PSYCHIATRY & NEUROLOGY

## 2019-12-09 RX ORDER — CLONAZEPAM 0.5 MG/1
0.5 TABLET ORAL 2 TIMES DAILY PRN
Qty: 60 TABLET | Refills: 1 | Status: SHIPPED | OUTPATIENT
Start: 2019-12-09 | End: 2020-01-16 | Stop reason: SDUPTHER

## 2020-01-15 ENCOUNTER — HOSPITAL ENCOUNTER (EMERGENCY)
Age: 40
Discharge: HOME | End: 2020-01-15
Payer: COMMERCIAL

## 2020-01-15 VITALS
TEMPERATURE: 98.3 F | DIASTOLIC BLOOD PRESSURE: 83 MMHG | RESPIRATION RATE: 18 BRPM | WEIGHT: 210 LBS | HEIGHT: 67 IN | OXYGEN SATURATION: 96 % | HEART RATE: 65 BPM | SYSTOLIC BLOOD PRESSURE: 124 MMHG | BODY MASS INDEX: 32.96 KG/M2

## 2020-01-15 PROCEDURE — 99282 EMERGENCY DEPT VISIT SF MDM: CPT

## 2020-01-15 RX ORDER — IBUPROFEN 200 MG
600 TABLET ORAL ONCE
Status: COMPLETED | OUTPATIENT
Start: 2020-01-15 | End: 2020-01-15

## 2020-01-15 RX ADMIN — Medication 600 MG: at 14:33

## 2020-01-15 ASSESSMENT — PAIN SCALES - GENERAL
PAINLEVEL_OUTOF10: 10
PAINLEVEL_OUTOF10: 10

## 2020-01-15 ASSESSMENT — PAIN DESCRIPTION - DESCRIPTORS: DESCRIPTORS: CONSTANT;SHARP;STABBING;THROBBING

## 2020-01-15 ASSESSMENT — PAIN DESCRIPTION - LOCATION: LOCATION: HEAD

## 2020-01-15 ASSESSMENT — ENCOUNTER SYMPTOMS
NAUSEA: 0
COLOR CHANGE: 0
VOMITING: 0

## 2020-01-15 ASSESSMENT — PAIN DESCRIPTION - ONSET: ONSET: AWAKENED FROM SLEEP

## 2020-01-15 ASSESSMENT — PAIN DESCRIPTION - FREQUENCY: FREQUENCY: CONTINUOUS

## 2020-01-15 ASSESSMENT — PAIN DESCRIPTION - PAIN TYPE: TYPE: ACUTE PAIN

## 2020-01-15 ASSESSMENT — PAIN DESCRIPTION - ORIENTATION: ORIENTATION: LEFT;ANTERIOR
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
## 2020-01-16 ENCOUNTER — NURSE ONLY (OUTPATIENT)
Dept: LAB | Age: 40
End: 2020-01-16

## 2020-01-16 ENCOUNTER — OFFICE VISIT (OUTPATIENT)
Dept: FAMILY MEDICINE CLINIC | Age: 40
End: 2020-01-16
Payer: COMMERCIAL

## 2020-01-16 ENCOUNTER — OFFICE VISIT (OUTPATIENT)
Dept: PSYCHIATRY | Age: 40
End: 2020-01-16
Payer: COMMERCIAL

## 2020-01-16 VITALS
SYSTOLIC BLOOD PRESSURE: 126 MMHG | HEIGHT: 66 IN | HEART RATE: 76 BPM | BODY MASS INDEX: 32.95 KG/M2 | DIASTOLIC BLOOD PRESSURE: 88 MMHG | TEMPERATURE: 97.9 F | OXYGEN SATURATION: 99 % | WEIGHT: 205 LBS

## 2020-01-16 LAB — TSH SERPL DL<=0.05 MIU/L-ACNC: 1.4 UIU/ML (ref 0.4–4.2)

## 2020-01-16 PROCEDURE — G8427 DOCREV CUR MEDS BY ELIG CLIN: HCPCS | Performed by: FAMILY MEDICINE

## 2020-01-16 PROCEDURE — 99213 OFFICE O/P EST LOW 20 MIN: CPT | Performed by: PSYCHIATRY & NEUROLOGY

## 2020-01-16 PROCEDURE — G8417 CALC BMI ABV UP PARAM F/U: HCPCS | Performed by: FAMILY MEDICINE

## 2020-01-16 PROCEDURE — G8428 CUR MEDS NOT DOCUMENT: HCPCS | Performed by: PSYCHIATRY & NEUROLOGY

## 2020-01-16 PROCEDURE — G8417 CALC BMI ABV UP PARAM F/U: HCPCS | Performed by: PSYCHIATRY & NEUROLOGY

## 2020-01-16 PROCEDURE — G8484 FLU IMMUNIZE NO ADMIN: HCPCS | Performed by: PSYCHIATRY & NEUROLOGY

## 2020-01-16 PROCEDURE — G8484 FLU IMMUNIZE NO ADMIN: HCPCS | Performed by: FAMILY MEDICINE

## 2020-01-16 PROCEDURE — 4004F PT TOBACCO SCREEN RCVD TLK: CPT | Performed by: PSYCHIATRY & NEUROLOGY

## 2020-01-16 PROCEDURE — 90833 PSYTX W PT W E/M 30 MIN: CPT | Performed by: PSYCHIATRY & NEUROLOGY

## 2020-01-16 PROCEDURE — 99214 OFFICE O/P EST MOD 30 MIN: CPT | Performed by: FAMILY MEDICINE

## 2020-01-16 PROCEDURE — 4004F PT TOBACCO SCREEN RCVD TLK: CPT | Performed by: FAMILY MEDICINE

## 2020-01-16 RX ORDER — BUDESONIDE AND FORMOTEROL FUMARATE DIHYDRATE 160; 4.5 UG/1; UG/1
2 AEROSOL RESPIRATORY (INHALATION) 2 TIMES DAILY
Qty: 1 INHALER | Refills: 0 | Status: SHIPPED | OUTPATIENT
Start: 2020-01-16 | End: 2020-03-09

## 2020-01-16 RX ORDER — BUPROPION HYDROCHLORIDE 150 MG/1
150 TABLET ORAL EVERY MORNING
Qty: 30 TABLET | Refills: 3 | Status: SHIPPED | OUTPATIENT
Start: 2020-01-16 | End: 2020-06-16 | Stop reason: SDUPTHER

## 2020-01-16 RX ORDER — CLONAZEPAM 0.5 MG/1
0.5 TABLET ORAL 2 TIMES DAILY PRN
Qty: 60 TABLET | Refills: 1 | Status: SHIPPED | OUTPATIENT
Start: 2020-01-16 | End: 2020-03-23 | Stop reason: SDUPTHER

## 2020-01-16 RX ORDER — DULOXETIN HYDROCHLORIDE 60 MG/1
60 CAPSULE, DELAYED RELEASE ORAL DAILY
Qty: 30 CAPSULE | Refills: 3 | Status: SHIPPED | OUTPATIENT
Start: 2020-01-16 | End: 2020-04-20

## 2020-01-16 NOTE — PROGRESS NOTES
PSYCHIATRIC FOLLOW UP NOTE     PATIENT NAME: Xavier Galan     : 1980   DATE of VISIT: 2020   Chief Complaint   Patient presents with    Depression          Interval History: Today I met with Ms. Iraida Deonna in follow up. We spent 25 minutes on psychotherapy and the remainder of the time on sx evaluation and medication management. Her  did not appear at divorce hearing so it is rescheduled for next week. She continues to experience significant anxiety, hypersomnia, and decreased appetite with weight loss. No SI, no psychotic sx     Difficulties in asking for and accepting help. Recurrence of traumatic memories  Current meds:   Current Outpatient Medications   Medication Sig Dispense Refill    buPROPion (WELLBUTRIN XL) 150 MG extended release tablet Take 1 tablet by mouth every morning 30 tablet 3    clonazePAM (KLONOPIN) 0.5 MG tablet Take 1 tablet by mouth 2 times daily as needed for Anxiety for up to 30 days. 60 tablet 1    DULoxetine (CYMBALTA) 60 MG extended release capsule Take 1 capsule by mouth daily 30 capsule 3    levothyroxine (SYNTHROID) 50 MCG tablet Take 1 tablet by mouth Daily 90 tablet 1    ipratropium (ATROVENT) 0.06 % nasal spray 2 sprays by Nasal route 3 times daily 1 Bottle 3    fluticasone (FLOVENT HFA) 220 MCG/ACT inhaler Inhale 2 puffs into the lungs 2 times daily 1 Inhaler 3    albuterol sulfate HFA (PROVENTIL HFA) 108 (90 Base) MCG/ACT inhaler Inhale 2 puffs into the lungs every 4 hours as needed for Wheezing or Shortness of Breath (Space out to every 6 hours as symptoms improve) Space out to every 6 hours as symptoms improve.  1 Inhaler 0    ibuprofen (ADVIL;MOTRIN) 600 MG tablet Take 1 tablet by mouth every 6 hours as needed for Pain 30 tablet 0    guaiFENesin (ROBITUSSIN) 100 MG/5ML SOLN oral solution Take 10 mLs by mouth every 4 hours as needed for Cough (Patient not taking: Reported on 2019) 1200 mL 0    prazosin (MINIPRESS) 1 MG capsule Take 3 capsules by mouth nightly 90 capsule 3    ondansetron (ZOFRAN ODT) 4 MG disintegrating tablet Take 1 tablet by mouth every 8 hours as needed for Nausea or Vomiting 15 tablet 0    dicyclomine (BENTYL) 10 MG capsule Take 1 capsule by mouth 4 times daily as needed (abdominal cramping) 20 capsule 0    norethindrone (AYGESTIN) 5 MG tablet Take 5 mg by mouth daily      fluticasone (FLONASE) 50 MCG/ACT nasal spray 1 spray by Each Nare route daily      Progesterone Micronized (PROGESTERONE PO) Take by mouth      acetaminophen (APAP EXTRA STRENGTH) 500 MG tablet Take 1 tablet by mouth every 6 hours as needed for Pain 120 tablet 3     No current facility-administered medications for this visit. Mental Status Exam:   Appearance: loose clothing, hygiene good   Gait:limping due to knee pain  Behavior: Cooperative and Appropriate   Eye Contact: Maintained good eye contact   Psychomotor Activity: Normal   Speech: Slow   Mood: Depressed   Affect: Dysphoric   Thought Process: Goal directed and coherent   Thought content: The thought content was appropriate to questions. The patient denies any suicidal ideation or homicidal ideation. Perceptions: Perceptions were normal, the patient denied any auditory, tactile or visual hallucinations   Cognition: Patient is alert and oriented to time, place and person, no gross cognitive deficits   Memory:grossly intact  Attention:ok  Judgment: Fair   Insight: Fair   Impulse control: Intact     Assessment:   Mood low, sx likely exacerbated by divorce proceedings   Diagnosis:   1. Moderate episode of recurrent major depressive disorder ([facility] [address]    2. PTSD (post-traumatic stress disorder)    3.  Panic disorder        Plan:   Continue bupropion  mg  Continue duloxetine 60 mg  Continue clonazepam 0.5 mg bid prn  Continue outside counselling  Return x 1 month

## 2020-01-16 NOTE — PROGRESS NOTES
with Atrovent. Continue Atrovent    Wheal- self-limiting. Encouraged Benadryl. Health Maintenance Due   Topic Date Due    Pneumococcal 0-64 years Vaccine (1 of 1 - PPSV23) 03/28/1985    Cervical cancer screen  03/28/2000         Return in about 4 weeks (around 2/13/2020).       Luis Manuel Ortiz

## 2020-01-27 ENCOUNTER — HOSPITAL ENCOUNTER (OUTPATIENT)
Dept: MRI IMAGING | Age: 40
Discharge: HOME | End: 2020-01-27
Payer: COMMERCIAL

## 2020-01-27 PROCEDURE — 73721 MRI JNT OF LWR EXTRE W/O DYE: CPT

## 2020-02-20 ENCOUNTER — OFFICE VISIT (OUTPATIENT)
Dept: FAMILY MEDICINE CLINIC | Age: 40
End: 2020-02-20
Payer: COMMERCIAL

## 2020-02-20 VITALS
OXYGEN SATURATION: 98 % | HEART RATE: 98 BPM | BODY MASS INDEX: 31.21 KG/M2 | SYSTOLIC BLOOD PRESSURE: 126 MMHG | RESPIRATION RATE: 12 BRPM | WEIGHT: 194.2 LBS | DIASTOLIC BLOOD PRESSURE: 84 MMHG | TEMPERATURE: 98.1 F | HEIGHT: 66 IN

## 2020-02-20 PROCEDURE — 4004F PT TOBACCO SCREEN RCVD TLK: CPT | Performed by: FAMILY MEDICINE

## 2020-02-20 PROCEDURE — G8484 FLU IMMUNIZE NO ADMIN: HCPCS | Performed by: FAMILY MEDICINE

## 2020-02-20 PROCEDURE — G8417 CALC BMI ABV UP PARAM F/U: HCPCS | Performed by: FAMILY MEDICINE

## 2020-02-20 PROCEDURE — G8427 DOCREV CUR MEDS BY ELIG CLIN: HCPCS | Performed by: FAMILY MEDICINE

## 2020-02-20 PROCEDURE — 99214 OFFICE O/P EST MOD 30 MIN: CPT | Performed by: FAMILY MEDICINE

## 2020-02-20 RX ORDER — AMITRIPTYLINE HYDROCHLORIDE 25 MG/1
25 TABLET, FILM COATED ORAL NIGHTLY PRN
COMMUNITY
Start: 2020-01-15 | End: 2020-04-20

## 2020-02-20 RX ORDER — IBUPROFEN 200 MG
200 TABLET ORAL EVERY 6 HOURS PRN
COMMUNITY
End: 2022-06-01

## 2020-02-20 RX ORDER — NAPROXEN 500 MG/1
500 TABLET ORAL 2 TIMES DAILY WITH MEALS
Qty: 60 TABLET | Refills: 0 | Status: SHIPPED | OUTPATIENT
Start: 2020-02-20 | End: 2020-06-22 | Stop reason: SDUPTHER

## 2020-02-20 RX ORDER — ACETAMINOPHEN AND CODEINE PHOSPHATE 120; 12 MG/5ML; MG/5ML
SOLUTION ORAL DAILY
COMMUNITY
Start: 2020-01-18 | End: 2021-07-30

## 2020-02-24 ENCOUNTER — OFFICE VISIT (OUTPATIENT)
Dept: PSYCHIATRY | Age: 40
End: 2020-02-24
Payer: COMMERCIAL

## 2020-02-24 ENCOUNTER — HOSPITAL ENCOUNTER (OUTPATIENT)
Dept: PHYSICAL THERAPY | Age: 40
Setting detail: THERAPIES SERIES
Discharge: HOME | End: 2020-02-24
Payer: COMMERCIAL

## 2020-02-24 PROCEDURE — 99214 OFFICE O/P EST MOD 30 MIN: CPT | Performed by: PSYCHIATRY & NEUROLOGY

## 2020-02-24 PROCEDURE — G8484 FLU IMMUNIZE NO ADMIN: HCPCS | Performed by: PSYCHIATRY & NEUROLOGY

## 2020-02-24 PROCEDURE — 97162 PT EVAL MOD COMPLEX 30 MIN: CPT

## 2020-02-24 PROCEDURE — 4004F PT TOBACCO SCREEN RCVD TLK: CPT | Performed by: PSYCHIATRY & NEUROLOGY

## 2020-02-24 PROCEDURE — G8417 CALC BMI ABV UP PARAM F/U: HCPCS | Performed by: PSYCHIATRY & NEUROLOGY

## 2020-02-24 PROCEDURE — 97110 THERAPEUTIC EXERCISES: CPT

## 2020-02-24 PROCEDURE — G8428 CUR MEDS NOT DOCUMENT: HCPCS | Performed by: PSYCHIATRY & NEUROLOGY

## 2020-02-24 PROCEDURE — 90833 PSYTX W PT W E/M 30 MIN: CPT | Performed by: PSYCHIATRY & NEUROLOGY

## 2020-02-24 RX ORDER — DULOXETIN HYDROCHLORIDE 30 MG/1
30 CAPSULE, DELAYED RELEASE ORAL DAILY
Qty: 30 CAPSULE | Refills: 3 | Status: SHIPPED | OUTPATIENT
Start: 2020-02-24 | End: 2020-04-20

## 2020-02-24 ASSESSMENT — PAIN DESCRIPTION - LOCATION: LOCATION: KNEE

## 2020-02-24 ASSESSMENT — PAIN DESCRIPTION - ORIENTATION: ORIENTATION: RIGHT

## 2020-02-24 ASSESSMENT — PAIN SCALES - GENERAL: PAINLEVEL_OUTOF10: 7

## 2020-02-24 ASSESSMENT — PAIN DESCRIPTION - PAIN TYPE: TYPE: SURGICAL PAIN

## 2020-02-24 NOTE — FLOWSHEET NOTE
** PLEASE SIGN, DATE AND TIME CERTIFICATION BELOW AND RETURN TO [facility] OUTPATIENT REHABILITATION (FAX #: [phone]). ATTEST/CO-SIGN IF ACCESSING VIA [employer]. THANK YOU.**    I certify that I have examined the patient below and determined that Physical Medicine and Rehabilitation service is necessary and that I approve the established plan of care for up to 90 days or as specifically noted. Attestation, signature or co-signature of physician indicates approval of certification requirements.    ________________________ ____________ __________  Physician Signature   Date   Time       [place]     Time In: 9344  Time Out: 5972  Minutes: 38  Timed Code Treatment Minutes: 15 Minutes           LEFS  =28%    Date: 2020  Patient Name: Sandra Li,  Gender:  female        CSN: [id number]   : 1980  (35 y.o.)        Referring Practitioner: Sushma Curiel PA-C      Diagnosis: M17.11- Unilateral primary osteoarthritis, right knee; S83.241D - Other tear of medial meniscus, current injury, right knee, subsequent encounter  Treatment Diagnosis: s/p R knee scope 20, post-op right knee pain, decreased right knee AROM, right hip and knee weakness, difficulty walking and climbing stairs  Additional Pertinent Hx: PMH: OA in knees, anxiety/depression, scoliosis, right knee scope . General:  PT Visit Information  Onset Date: 20  PT Insurance Information: [place]- allowed 30 visits per calendar year, aquatics and modalities covered except ionto, HP/CP  Total # of Visits Approved: 30  Total # of Visits to Date: 1  Plan of Care/Certification Expiration Date: 20  Progress Note Counter:  for PN. See Medical History Questionnaire for information related to allergies and medications.     Subjective:  Chart Reviewed: Yes  Patient assessed for rehabilitation services?: Balance Training, Gait Training, Stair training, Manual Therapy - Soft Tissue Mobilization, Modalities, Home Exercise Program, Patient/Caregiver Education & Training  Plan Comment: POC initiated. History: Personal factors or comorbidities that impact plan of care -  Moderate Complexity: 1-2 personal factors or comorbidities. See history section above for details. Examination: Body structures and functions, activity limitations, participation restrictions; using standardized tests and measures - Moderate Complexity: 3 or morebody structures and functional, activity limitations and/or participation restrictions. See restrictions and objective section above for details. Clinical Presentation: Moderate - Evolving with Changing Characteristics: recent surgery, uncomplicated    Decision Making: Moderate Complexity due to above reasons, higher pain levels, compliance concerns. Decision making was based on patient assessment and decision making process in determining plan of care and establishing reasonable expectations for measurable functional outcomes. Evaluation Complexity: Based on the findings of patient history, examination, clinical presentation, and decision making during this evaluation, the evaluation of Jesse Brown  is of medium complexity. Goals:       Short term goals  Time Frame for Short term goals: 4 weeks  Short term goal 1: Pt will improve right knee AROM to 0-140 degrees for equal squat to floor to retrieve objects. Short term goal 2: Pt will improve right hip and knee strength to 5/5 to negotiate flight of steps recirpocally without handrail or deviations. Short term goal 3: Pt will ambulate in community with good heel/toe pattern and no antalgia for safe and normal mobility. Short term goal 4: Pt will report <2/10 right knee pain at end of therapy sessions to tolerate standing and walking longer durations.      Long term goals  Time Frame for Long term goals : 15 weeks  Long term goal 1: Pt will be independent and compliant with HEP daily to achieve above goals. Long term goal 2: Pt will improve LEFS score from 22/80 to 32/80 for ease with mobility and housework.      Ugo Cary, PT

## 2020-02-24 NOTE — PROGRESS NOTES
Memory:grossly intact  Attention:ok  Judgment: Fair   Insight: Fair   Impulse control: Fair     Assessment:   Mood low   Relationships tumultuous   Diagnosis:   1. Moderate episode of recurrent major depressive disorder ([facility]    2. PTSD (post-traumatic stress disorder)    3.  Personality disorder, unspecified ([facility]        Plan:   Continue bupropion  mg  Continue prazosin 3 mg  Continue clonazepam 0.5 mg bid prn  Increase duloxetine to 90 mg  Resume outside counseling  Return x 6 weeks, Teresa Gonzalez is aware she will be seeing a new psychiatrist

## 2020-02-27 ENCOUNTER — HOSPITAL ENCOUNTER (OUTPATIENT)
Dept: PHYSICAL THERAPY | Age: 40
Setting detail: THERAPIES SERIES
End: 2020-02-27
Payer: COMMERCIAL

## 2020-03-02 ENCOUNTER — HOSPITAL ENCOUNTER (OUTPATIENT)
Dept: PHYSICAL THERAPY | Age: 40
Setting detail: THERAPIES SERIES
Discharge: HOME | End: 2020-03-02
Payer: COMMERCIAL

## 2020-03-02 PROCEDURE — 97110 THERAPEUTIC EXERCISES: CPT

## 2020-03-02 ASSESSMENT — PAIN DESCRIPTION - PAIN TYPE: TYPE: SURGICAL PAIN

## 2020-03-02 ASSESSMENT — PAIN SCALES - GENERAL: PAINLEVEL_OUTOF10: 6

## 2020-03-02 ASSESSMENT — PAIN DESCRIPTION - LOCATION: LOCATION: KNEE

## 2020-03-02 ASSESSMENT — PAIN DESCRIPTION - ORIENTATION: ORIENTATION: RIGHT

## 2020-03-02 NOTE — PROGRESS NOTES
[place]     Time In: 5027  Time Out: 1113  Minutes: 34  Timed Code Treatment Minutes: 34 Minutes                Date: 3/2/2020  Patient Name: Tiffanie Campos,  Gender:  female        CSN: [id number]   : 1980  (35 y.o.)       Referring Practitioner: Xiang Yung PA-C      Diagnosis: M17.11- Unilateral primary osteoarthritis, right knee; S83.241D - Other tear of medial meniscus, current injury, right knee, subsequent encounter  Treatment Diagnosis: s/p R knee scope 20, post-op right knee pain, decreased right knee AROM, right hip and knee weakness, difficulty walking and climbing stairs   Additional Pertinent Hx: PMH: OA in knees, anxiety/depression, scoliosis, right knee scope . General:  PT Visit Information  Onset Date: 20  PT Insurance Information: [place] Medicaid- allowed 30 visits per calendar year, aquatics and modalities covered except ionto, HP/CP  Total # of Visits Approved: 30  Total # of Visits to Date: 2  Plan of Care/Certification Expiration Date: 20  Progress Note Counter: 2/6 for PN. Subjective:  Comments: Pt unsure of follow up with surgeon (Dr. Juanita Garcia), 3-4 weeks. Subjective: Patient reporting pain 6/10 and using aleve at times. Patient admits to not using ice and not doing HEP.       Pain:  Patient Currently in Pain: Yes  Pain Assessment: 0-10  Pain Level: 6  Pain Type: Surgical pain  Pain Location: Knee  Pain Orientation: Right      Objective  Exercises  Exercise 1: Right quad sets 15x5 sec hold, 4 way leg raise x10, heel slides x10  Exercise 2: Matrix bike S7 x5 min, L1   Exercise 3: Step stretches: right hamstring, knee flexion and calf 3x10 sec hold  Exercise 4: R LAQ x10 -one pop today during LAQ's  Exercise 5: heel raises, marching, 3 way hip, hamstring curls, squats x 10   Exercise 6: Rocker Board 2 directions x 10; balance 30 seconds each   Exercise 7: 4 inch step ups forward and lateral x 10 each   Exercise 8: Foam: narrow stance 30 seconds, step stance 30 seconds each, tandem stance 30 seconds each weight shifts x 10          Activity Tolerance:  Activity Tolerance: Patient Tolerated treatment well    Assessment: Body structures, Functions, Activity limitations: Decreased functional mobility , Decreased ROM, Decreased strength, Decreased balance, Increased pain, Decreased high-level IADL's  Assessment: Progressed with exercises as noted with patient tolerating well. Patient having a little fatigue during session and a little more pain at end of session but having no other complains. Prognosis: Good       Patient Education: Importance of HEP. Plan:  Times per week: 2x  Plan weeks: 12 weeks  Specific instructions for Next Treatment: review HEP, add standing and balance exercises, step ups  Current Treatment Recommendations: Strengthening, ROM, Balance Training, Gait Training, Stair training, Manual Therapy - Soft Tissue Mobilization, Modalities, Home Exercise Program, Patient/Caregiver Education & Training  Plan Comment: Continue per POC. Goals:       Short term goals  Time Frame for Short term goals: 4 weeks  Short term goal 1: Pt will improve right knee AROM to 0-140 degrees for equal squat to floor to retrieve objects. Short term goal 2: Pt will improve right hip and knee strength to 5/5 to negotiate flight of steps recirpocally without handrail or deviations. Short term goal 3: Pt will ambulate in community with good heel/toe pattern and no antalgia for safe and normal mobility. Short term goal 4: Pt will report <2/10 right knee pain at end of therapy sessions to tolerate standing and walking longer durations. Long term goals  Time Frame for Long term goals : 12 weeks  Long term goal 1: Pt will be independent and compliant with HEP daily to achieve above goals.    Long term goal 2: Pt will

## 2020-03-05 ENCOUNTER — HOSPITAL ENCOUNTER (OUTPATIENT)
Dept: PHYSICAL THERAPY | Age: 40
Setting detail: THERAPIES SERIES
Discharge: HOME | End: 2020-03-05
Payer: COMMERCIAL

## 2020-03-05 PROCEDURE — 97110 THERAPEUTIC EXERCISES: CPT

## 2020-03-05 ASSESSMENT — PAIN DESCRIPTION - PAIN TYPE: TYPE: SURGICAL PAIN

## 2020-03-05 ASSESSMENT — PAIN DESCRIPTION - LOCATION: LOCATION: KNEE

## 2020-03-05 ASSESSMENT — PAIN DESCRIPTION - ORIENTATION: ORIENTATION: RIGHT

## 2020-03-05 ASSESSMENT — PAIN SCALES - GENERAL: PAINLEVEL_OUTOF10: 7

## 2020-03-05 NOTE — PROGRESS NOTES
be independent and compliant with HEP daily to achieve above goals. Long term goal 2: Pt will improve LEFS score from 22/80 to 32/80 for ease with mobility and housework.      Danita Steve, PTA

## 2020-03-09 ENCOUNTER — HOSPITAL ENCOUNTER (OUTPATIENT)
Dept: PHYSICAL THERAPY | Age: 40
Setting detail: THERAPIES SERIES
Discharge: HOME | End: 2020-03-09
Payer: COMMERCIAL

## 2020-03-09 RX ORDER — BUDESONIDE AND FORMOTEROL FUMARATE DIHYDRATE 160; 4.5 UG/1; UG/1
AEROSOL RESPIRATORY (INHALATION)
Qty: 11 G | Refills: 5 | Status: SHIPPED | OUTPATIENT
Start: 2020-03-09 | End: 2020-06-22 | Stop reason: SDUPTHER

## 2020-03-09 RX ORDER — DICYCLOMINE HYDROCHLORIDE 10 MG/1
CAPSULE ORAL
Qty: 28 CAPSULE | Refills: 0 | OUTPATIENT
Start: 2020-03-09

## 2020-03-09 NOTE — TELEPHONE ENCOUNTER
Last visit- 2/20/2020  Next visit- 5/21/2020 with Dr. Chris Mercado 160-4.5 MCG/ACT AERO [Pharmacy Med Name: Symbicort 160-4.5 MCG/ACT Inhalation Aerosol] 11 g 5     Sig: Inhale 2 puffs by mouth twice daily       Pended with 5 refills.   Please review, approve or deny

## 2020-03-12 ENCOUNTER — HOSPITAL ENCOUNTER (OUTPATIENT)
Dept: PHYSICAL THERAPY | Age: 40
Setting detail: THERAPIES SERIES
End: 2020-03-12
Payer: COMMERCIAL

## 2020-03-20 ENCOUNTER — HOSPITAL ENCOUNTER (OUTPATIENT)
Dept: PHYSICAL THERAPY | Age: 40
Setting detail: THERAPIES SERIES
End: 2020-03-20
Payer: COMMERCIAL

## 2020-03-23 RX ORDER — CLONAZEPAM 0.5 MG/1
0.5 TABLET ORAL 2 TIMES DAILY PRN
Qty: 60 TABLET | Refills: 0 | Status: SHIPPED | OUTPATIENT
Start: 2020-03-23 | End: 2020-04-20

## 2020-03-23 NOTE — TELEPHONE ENCOUNTER
Evelyn Li called into the office stating that she needs a couple medication refills: she has requested Wellbutrin XL 150mg, klonopin 0.5mg and Cymbalta 60mg. Records indicate that the Wellbutrin and Cymbalta should have remaining refills; last sent on 01/16/20 with 3 refills. The Klonopin 0.5mg;#60 with 1 refill; last with a start date of 01/16/20 is pending your approval for a 30 day supply with 0 refills. She is scheduled to return with this provider being transferred from Dr. Esperanza Olmstead on 04/20/20.

## 2020-04-20 ENCOUNTER — TELEPHONE (OUTPATIENT)
Dept: PSYCHIATRY | Age: 40
End: 2020-04-20

## 2020-04-20 ENCOUNTER — VIRTUAL VISIT (OUTPATIENT)
Dept: PSYCHIATRY | Age: 40
End: 2020-04-20

## 2020-04-20 PROBLEM — F40.01 PANIC DISORDER WITH AGORAPHOBIA: Status: ACTIVE | Noted: 2020-04-20

## 2020-04-20 PROCEDURE — 4004F PT TOBACCO SCREEN RCVD TLK: CPT | Performed by: PSYCHIATRY & NEUROLOGY

## 2020-04-20 PROCEDURE — 90792 PSYCH DIAG EVAL W/MED SRVCS: CPT | Performed by: PSYCHIATRY & NEUROLOGY

## 2020-04-20 RX ORDER — DULOXETIN HYDROCHLORIDE 60 MG/1
60 CAPSULE, DELAYED RELEASE ORAL 2 TIMES DAILY
Qty: 60 CAPSULE | Refills: 3 | Status: SHIPPED | OUTPATIENT
Start: 2020-04-20 | End: 2020-09-09

## 2020-04-20 RX ORDER — CLONAZEPAM 1 MG/1
1 TABLET ORAL 2 TIMES DAILY PRN
Qty: 60 TABLET | Refills: 0 | Status: SHIPPED | OUTPATIENT
Start: 2020-04-20 | End: 2020-05-19
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
## 2020-05-18 ENCOUNTER — VIRTUAL VISIT (OUTPATIENT)
Dept: PSYCHIATRY | Age: 40
End: 2020-05-18
Payer: COMMERCIAL

## 2020-05-18 PROCEDURE — 4004F PT TOBACCO SCREEN RCVD TLK: CPT | Performed by: PSYCHIATRY & NEUROLOGY

## 2020-05-18 PROCEDURE — G8428 CUR MEDS NOT DOCUMENT: HCPCS | Performed by: PSYCHIATRY & NEUROLOGY

## 2020-05-18 PROCEDURE — G8417 CALC BMI ABV UP PARAM F/U: HCPCS | Performed by: PSYCHIATRY & NEUROLOGY

## 2020-05-18 PROCEDURE — 99213 OFFICE O/P EST LOW 20 MIN: CPT | Performed by: PSYCHIATRY & NEUROLOGY
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
## 2020-06-01 ENCOUNTER — E-VISIT (OUTPATIENT)
Dept: FAMILY MEDICINE CLINIC | Age: 40
End: 2020-06-01
Payer: COMMERCIAL

## 2020-06-01 PROCEDURE — 99213 OFFICE O/P EST LOW 20 MIN: CPT | Performed by: FAMILY MEDICINE

## 2020-06-01 RX ORDER — CYCLOBENZAPRINE HCL 10 MG
10 TABLET ORAL NIGHTLY PRN
Qty: 30 TABLET | Refills: 0 | Status: SHIPPED | OUTPATIENT
Start: 2020-06-01 | End: 2020-06-11

## 2020-06-01 RX ORDER — MELOXICAM 15 MG/1
15 TABLET ORAL DAILY PRN
Qty: 30 TABLET | Refills: 0 | Status: SHIPPED | OUTPATIENT
Start: 2020-06-01 | End: 2021-07-30

## 2020-06-01 NOTE — PROGRESS NOTES
2020     Victorino Ring (:  1980) is a 38 y.o. female, here for evaluation of the following medical concerns:    HPI  reviewed patient recorded answers for ED visit. Low back pain radiating into the right hip for about 7 days worse with walking sitting or running. Diclofenac gel seems to be helping some. She has a recent surgery on her right knee about 4 months ago. Reported as severe and unable to move without pain. Constant pain    Review of Systems  Denies cp or sob. No abd pain. Prior to Visit Medications    Medication Sig Taking?  Authorizing Provider   cyclobenzaprine (FLEXERIL) 10 MG tablet Take 1 tablet by mouth nightly as needed for Muscle spasms Yes Aixa Malin DO   meloxicam (MOBIC) 15 MG tablet Take 1 tablet by mouth daily as needed for Pain Yes Aixa Malin DO   levothyroxine (SYNTHROID) 50 MCG tablet Take 1 tablet by mouth once daily  Aixa Malin DO   clonazePAM (KLONOPIN) 1 MG tablet Take 1 tablet by mouth twice daily as needed for anxiety  Ashley Taylor MD   prazosin (MINIPRESS) 1 MG capsule Take 3 capsules by mouth nightly  Ashley Taylor MD   DULoxetine (CYMBALTA) 60 MG extended release capsule Take 1 capsule by mouth 2 times daily Start after completing the 30's  Ashley Taylor MD   SYMBICORT 160-4.5 MCG/ACT AERO Inhale 2 puffs by mouth twice daily  Aixa Malin DO   ibuprofen (ADVIL;MOTRIN) 200 MG tablet Take 200 mg by mouth every 6 hours as needed for Pain  Historical Provider, MD   norethindrone (MICRONOR) 0.35 MG tablet daily  Historical Provider, MD   naproxen (NAPROSYN) 500 MG tablet Take 1 tablet by mouth 2 times daily (with meals)  Aixa Malin DO   buPROPion (WELLBUTRIN XL) 150 MG extended release tablet Take 1 tablet by mouth every morning  Ursula Ladd MD   ipratropium (ATROVENT) 0.06 % nasal spray 2 sprays by Nasal route 3 times daily  Aixa Malin DO   albuterol sulfate HFA (PROVENTIL HFA) 108 (90 Base) MCG/ACT inhaler Inhale 2 puffs into

## 2020-06-16 ENCOUNTER — VIRTUAL VISIT (OUTPATIENT)
Dept: PSYCHIATRY | Age: 40
End: 2020-06-16
Payer: COMMERCIAL

## 2020-06-16 PROCEDURE — G8417 CALC BMI ABV UP PARAM F/U: HCPCS | Performed by: PSYCHIATRY & NEUROLOGY

## 2020-06-16 PROCEDURE — 4004F PT TOBACCO SCREEN RCVD TLK: CPT | Performed by: PSYCHIATRY & NEUROLOGY

## 2020-06-16 PROCEDURE — 99213 OFFICE O/P EST LOW 20 MIN: CPT | Performed by: PSYCHIATRY & NEUROLOGY

## 2020-06-16 PROCEDURE — G8428 CUR MEDS NOT DOCUMENT: HCPCS | Performed by: PSYCHIATRY & NEUROLOGY

## 2020-06-16 RX ORDER — ARIPIPRAZOLE 2 MG/1
2 TABLET ORAL DAILY
Qty: 30 TABLET | Refills: 1 | Status: SHIPPED | OUTPATIENT
Start: 2020-06-16 | End: 2020-07-09

## 2020-06-16 RX ORDER — BUPROPION HYDROCHLORIDE 150 MG/1
150 TABLET ORAL EVERY MORNING
Qty: 30 TABLET | Refills: 5 | Status: SHIPPED | OUTPATIENT
Start: 2020-06-16 | End: 2020-07-09

## 2020-06-16 RX ORDER — CLONAZEPAM 1 MG/1
TABLET ORAL
Qty: 60 TABLET | Refills: 2 | Status: SHIPPED | OUTPATIENT
Start: 2020-06-16 | End: 2020-10-05

## 2020-06-16 NOTE — PROGRESS NOTES
Chief Complaint   Patient presents with    1 Month Follow-Up     MDD PTSD Panic     Jakob Prater is a 38 y.o. female being evaluated by a Virtual Visit (video visit) encounter to address concerns as mentioned above. A caregiver was present when appropriate. Due to this being a TeleHealth encounter (During [facility] public health emergency), evaluation of the following organ systems was limited: Vitals/Constitutional/EENT/Resp/CV/GI//MS/Neuro/Skin/Heme-Lymph-Imm. Pursuant to the emergency declaration under the [address] [place] [address], [address] [place] [address] authority and the [employer] Act, this Virtual Visit was conducted with patient's (and/or legal guardian's) consent, to reduce the patient's risk of exposure to COVID-19 and provide necessary medical care. The patient (and/or legal guardian) has also been advised to contact this office for worsening conditions or problems, and seek emergency medical treatment and/or call 911 if deemed necessary. Patient identification was verified at the start of the visit: Yes    Total time spent for this encounter: Not billed by time    Services were provided through a video synchronous discussion virtually to substitute for in-person clinic visit. Patient and provider were located at their individual homes. --Jeanie Pereira MD on 6/16/2020 at 2:06 PM    An electronic signature was used to authenticate this note    Tao Bell was interviewed by video felix for this appointment. She began by telling me that she is very distressed about her mother. She was trying to reconcile her relationship with her mother, and mother began sending her gifts. She began with the money, then with a computer, and then was trying to demand that Tao Bell support her in a family dispute of some sort. Tao Bell felt that she was trying to buy her loyalty and that precipitated an argument between them.     That has resulted in increased depression. She is also reporting some increase in difficulty sleeping, and an increase in panics. I am going to see if Abilify addition will augment her duloxetine 60 mg twice daily.     Mental Status Examination    Level of consciousness:  within normal limits  Appearance:  well-appearing, street clothes, in chair, good grooming and good hygiene  Behavior/Motor:  no abnormalities noted  Attitude toward examiner:  cooperative, attentive and good eye contact  Speech:  spontaneous, normal rate, normal volume and well articulated  Mood:  depressed  Affect:  mood congruent  Thought processes:  linear, goal directed and coherent  Thought content:  Homocidal ideation: none  Suicidal Ideation:  denies suicidal ideation  Delusions:  no evidence of delusions  Perceptual Disturbance:  denies any perceptual disturbance  Cognition:  oriented to person, place, and time  Concentration succeeded  Memory intact  Fund of knowledge:  good  Abstract thinking:  good  Insight:  good  Judgment:  good  Anxiety:   Generalized: No  Excessive Worry: No  Panic Attacks: Yes  Frequency:  Housebound: No   Obsession: No   Compulsion: No  Flashbacks:Yes  Nightmares Yes  Hyperarousal Yes     DIAGNOSTIC IMPRESSION  MDD chronic partial  PTSD  Panic with agoraphobia    Plan  Add Abiify  Current Outpatient Medications   Medication Sig Dispense Refill    buPROPion (WELLBUTRIN XL) 150 MG extended release tablet Take 1 tablet by mouth every morning 30 tablet 5    clonazePAM (KLONOPIN) 1 MG tablet Take 1 tablet by mouth twice daily as needed for anxiety 60 tablet 2    ARIPiprazole (ABILIFY) 2 MG tablet Take 1 tablet by mouth daily 30 tablet 1    meloxicam (MOBIC) 15 MG tablet Take 1 tablet by mouth daily as needed for Pain 30 tablet 0    levothyroxine (SYNTHROID) 50 MCG tablet Take 1 tablet by mouth once daily 90 tablet 3    prazosin (MINIPRESS) 1 MG capsule Take 3 capsules by mouth nightly 90 capsule 3    DULoxetine (CYMBALTA) 60 MG extended release capsule Take 1 capsule by mouth 2 times daily Start after completing the 30's 60 capsule 3    SYMBICORT 160-4.5 MCG/ACT AERO Inhale 2 puffs by mouth twice daily 11 g 5    ibuprofen (ADVIL;MOTRIN) 200 MG tablet Take 200 mg by mouth every 6 hours as needed for Pain      norethindrone (MICRONOR) 0.35 MG tablet daily      naproxen (NAPROSYN) 500 MG tablet Take 1 tablet by mouth 2 times daily (with meals) 60 tablet 0    ipratropium (ATROVENT) 0.06 % nasal spray 2 sprays by Nasal route 3 times daily 1 Bottle 3    albuterol sulfate HFA (PROVENTIL HFA) 108 (90 Base) MCG/ACT inhaler Inhale 2 puffs into the lungs every 4 hours as needed for Wheezing or Shortness of Breath (Space out to every 6 hours as symptoms improve) Space out to every 6 hours as symptoms improve. 1 Inhaler 0    guaiFENesin (ROBITUSSIN) 100 MG/5ML SOLN oral solution Take 10 mLs by mouth every 4 hours as needed for Cough 1200 mL 0    ondansetron (ZOFRAN ODT) 4 MG disintegrating tablet Take 1 tablet by mouth every 8 hours as needed for Nausea or Vomiting 15 tablet 0    dicyclomine (BENTYL) 10 MG capsule Take 1 capsule by mouth 4 times daily as needed (abdominal cramping) (Patient not taking: Reported on 2/20/2020) 20 capsule 0    fluticasone (FLONASE) 50 MCG/ACT nasal spray 1 spray by Each Nare route daily      acetaminophen (APAP EXTRA STRENGTH) 500 MG tablet Take 1 tablet by mouth every 6 hours as needed for Pain 120 tablet 3     No current facility-administered medications for this visit. Shyann Hoyos

## 2020-06-23 NOTE — TELEPHONE ENCOUNTER
Last visit- 2/20/2020  Next visit- Visit date not found    Requested Prescriptions     Pending Prescriptions Disp Refills    naproxen (NAPROSYN) 500 MG tablet 60 tablet 0     Sig: Take 1 tablet by mouth 2 times daily (with meals)    SYMBICORT 160-4.5 MCG/ACT AERO 11 g 5     Please review, approve or deny

## 2020-06-25 RX ORDER — NAPROXEN 500 MG/1
500 TABLET ORAL 2 TIMES DAILY WITH MEALS
Qty: 60 TABLET | Refills: 0 | Status: SHIPPED | OUTPATIENT
Start: 2020-06-25 | End: 2021-07-30

## 2020-06-25 RX ORDER — BUDESONIDE AND FORMOTEROL FUMARATE DIHYDRATE 160; 4.5 UG/1; UG/1
2 AEROSOL RESPIRATORY (INHALATION) 2 TIMES DAILY
Qty: 11 G | Refills: 5 | Status: SHIPPED | OUTPATIENT
Start: 2020-06-25 | End: 2020-06-29 | Stop reason: SDUPTHER

## 2020-06-29 ENCOUNTER — VIRTUAL VISIT (OUTPATIENT)
Dept: FAMILY MEDICINE CLINIC | Age: 40
End: 2020-06-29
Payer: COMMERCIAL

## 2020-06-29 PROCEDURE — 99213 OFFICE O/P EST LOW 20 MIN: CPT | Performed by: STUDENT IN AN ORGANIZED HEALTH CARE EDUCATION/TRAINING PROGRAM

## 2020-06-29 PROCEDURE — G8428 CUR MEDS NOT DOCUMENT: HCPCS | Performed by: STUDENT IN AN ORGANIZED HEALTH CARE EDUCATION/TRAINING PROGRAM

## 2020-06-29 RX ORDER — BUDESONIDE AND FORMOTEROL FUMARATE DIHYDRATE 160; 4.5 UG/1; UG/1
2 AEROSOL RESPIRATORY (INHALATION) 2 TIMES DAILY
Qty: 11 G | Refills: 5 | Status: SHIPPED | OUTPATIENT
Start: 2020-06-29 | End: 2021-06-01 | Stop reason: SDUPTHER

## 2020-06-29 RX ORDER — GUAIFENESIN 100 MG/5ML
200 SOLUTION ORAL EVERY 4 HOURS PRN
Qty: 1200 ML | Refills: 0 | Status: SHIPPED | OUTPATIENT
Start: 2020-06-29 | End: 2021-01-04

## 2020-06-29 RX ORDER — GUAIFENESIN 600 MG/1
600 TABLET, EXTENDED RELEASE ORAL 2 TIMES DAILY
Qty: 10 TABLET | Refills: 0 | Status: SHIPPED | OUTPATIENT
Start: 2020-06-29 | End: 2020-07-04

## 2020-06-29 RX ORDER — IPRATROPIUM BROMIDE 42 UG/1
2 SPRAY, METERED NASAL NIGHTLY PRN
Qty: 1 BOTTLE | Refills: 3 | Status: SHIPPED | OUTPATIENT
Start: 2020-06-29 | End: 2021-05-18 | Stop reason: SDUPTHER

## 2020-06-29 ASSESSMENT — ENCOUNTER SYMPTOMS
SINUS PRESSURE: 0
COUGH: 1
WHEEZING: 1
SINUS PAIN: 0
SORE THROAT: 0
ABDOMINAL PAIN: 0
EYE PAIN: 0
SHORTNESS OF BREATH: 0

## 2020-06-29 NOTE — PROGRESS NOTES
2020    TELEHEALTH EVALUATION -- Audio/Visual (During [facility]-16 public health emergency)    HPI:    Tayla Little (:  1980) has requested an audio/video evaluation for the following concern(s):    Wheezing and chest pain. Cough + sputum, yellow/green from nose x 2 weeks. Has no fever, does daily temp checks. + sick contacts  - son was sick with URI about 1 month ago. Using Aleve, Sudafed, Robitussin - helping with symptoms. Still smoking 1.5 packs a day. More stressed lately so smoking more. Has to deal with not having a job,  has also been out of work since December. Also dealing with [place] has increased depression, son is also more curious about the world. +decreased appetite, sleeping more, feeling more depressed. Denies any SI. Taking all medications. Follows with Psychiatry. Just had a virtual visit with counselor earlier today. Encouraged to increase exercise. Review of Systems   Constitutional: Positive for appetite change. Negative for fatigue and fever. HENT: Positive for congestion. Negative for sinus pressure, sinus pain and sore throat. Eyes: Negative for pain and visual disturbance. Respiratory: Positive for cough and wheezing. Negative for shortness of breath. Cardiovascular: Positive for chest pain. Negative for palpitations. Gastrointestinal: Negative for abdominal pain. Skin: Negative for rash. Neurological: Negative for dizziness. Psychiatric/Behavioral: Positive for confusion and sleep disturbance. Negative for suicidal ideas. Prior to Visit Medications    Medication Sig Taking?  Authorizing Provider   SYMBICORT 160-4.5 MCG/ACT AERO Inhale 2 puffs into the lungs 2 times daily Yes Hilary Montes De Oca MD   ipratropium (ATROVENT) 0.06 % nasal spray 2 sprays by Nasal route nightly as needed for Rhinitis Yes Hilary Montes De Oca MD   guaiFENesin (ROBITUSSIN) 100 MG/5ML SOLN oral solution Take 10 mLs by mouth every 4 hours as needed for Cough Yes Trisha Samaniego MD   guaiFENesin (MUCINEX) 600 MG extended release tablet Take 1 tablet by mouth 2 times daily for 5 days Yes Trisha Samaniego MD   naproxen (NAPROSYN) 500 MG tablet Take 1 tablet by mouth 2 times daily (with meals)  Lia Vidal DO   buPROPion (WELLBUTRIN XL) 150 MG extended release tablet Take 1 tablet by mouth every morning  Salvador Santoro MD   clonazePAM (KLONOPIN) 1 MG tablet Take 1 tablet by mouth twice daily as needed for anxiety  Salvador Santoro MD   ARIPiprazole (ABILIFY) 2 MG tablet Take 1 tablet by mouth daily  Salvador Santoro MD   meloxicam (MOBIC) 15 MG tablet Take 1 tablet by mouth daily as needed for Pain  Lia Vidal DO   levothyroxine (SYNTHROID) 50 MCG tablet Take 1 tablet by mouth once daily  iLa Vidal DO   prazosin (MINIPRESS) 1 MG capsule Take 3 capsules by mouth nightly  Salvador Santoro MD   DULoxetine (CYMBALTA) 60 MG extended release capsule Take 1 capsule by mouth 2 times daily Start after completing the 30's  Salvador Santoro MD   ibuprofen (ADVIL;MOTRIN) 200 MG tablet Take 200 mg by mouth every 6 hours as needed for Pain  Historical Provider, MD   norethindrone (MICRONOR) 0.35 MG tablet daily  Historical Provider, MD   albuterol sulfate HFA (PROVENTIL HFA) 108 (90 Base) MCG/ACT inhaler Inhale 2 puffs into the lungs every 4 hours as needed for Wheezing or Shortness of Breath (Space out to every 6 hours as symptoms improve) Space out to every 6 hours as symptoms improve.   Alyssia Barbosa PA-C   ondansetron (ZOFRAN ODT) 4 MG disintegrating tablet Take 1 tablet by mouth every 8 hours as needed for Nausea or Vomiting  Lia Vidal DO   dicyclomine (BENTYL) 10 MG capsule Take 1 capsule by mouth 4 times daily as needed (abdominal cramping)  Patient not taking: Reported on 2/20/2020  SHANA Perdomo CNP   fluticasone (FLONASE) 50 MCG/ACT nasal spray 1 spray by Each Nare route daily  Historical Provider, MD   acetaminophen (APAP EXTRA STRENGTH) 500 MG tablet Take 1 tablet by mouth every 6 hours as needed for Pain  Sergo Kyle PA-C       Social History     Tobacco Use    Smoking status: Current Every Day Smoker     Packs/day: 1.00     Years: 22.00     Pack years: 22.00     Types: Cigarettes     Start date: 1/16/1998    Smokeless tobacco: Never Used   Substance Use Topics    Alcohol use: Yes     Comment: social drinking    Drug use: No            PHYSICAL EXAMINATION:  Constitutional: [] Appears well-developed and well-nourished [] No apparent distress      [] Abnormal-   Mental status  [x] Alert and awake  [] Oriented to person/place/time []Able to follow commands      Eyes:  EOM    [x]  Normal  [] Abnormal-  Sclera  []  Normal  [] Abnormal -         Discharge [x]  None visible  [] Abnormal -    HENT:   [x] Normocephalic, atraumatic. [] Abnormal   [] Mouth/Throat: Mucous membranes are moist.     External Ears [x] Normal  [] Abnormal-     Neck: [x] No visualized mass     Pulmonary/Chest: [x] Respiratory effort normal.  [] No visualized signs of difficulty breathing or respiratory distress        [] Abnormal-      Musculoskeletal:   [] Normal gait with no signs of ataxia         [x] Normal range of motion of neck        [] Abnormal-       Neurological:        [x] No Facial Asymmetry (Cranial nerve 7 motor function) (limited exam to video visit)          [x] No gaze palsy        [] Abnormal-         Skin:        [x] No significant exanthematous lesions or discoloration noted on facial skin         [] Abnormal-            Psychiatric:       [] Normal Affect [] No Hallucinations        [x] Abnormal- depressed mood      ASSESSMENT/PLAN:  1. Viral URI  Likely viral URI. Advised if becomes febrile to quarantine. Will continue with Robitussin. Will Rx Mucinex  - guaiFENesin (ROBITUSSIN) 100 MG/5ML SOLN oral solution; Take 10 mLs by mouth every 4 hours as needed for Cough  Dispense: 1200 mL; Refill: 0  - guaiFENesin (MUCINEX) 600 MG extended release tablet;  Take 1 tablet by mouth 2 times daily for 5 days  Dispense: 10 tablet; Refill: 0    2. Moderate persistent reactive airway disease without complication  Discussed smoking cessation  Refills sent  - SYMBICORT 160-4.5 MCG/ACT AERO; Inhale 2 puffs into the lungs 2 times daily  Dispense: 11 g; Refill: 5  - ipratropium (ATROVENT) 0.06 % nasal spray; 2 sprays by Nasal route nightly as needed for Rhinitis  Dispense: 1 Bottle; Refill: 3      Return if symptoms worsen or fail to improve. Jaren Michaud is a 38 y.o. female being evaluated by a Virtual Visit (video visit) encounter to address concerns as mentioned above. A caregiver was present when appropriate. Due to this being a TeleHealth encounter (During [facility]-69 public health emergency), evaluation of the following organ systems was limited: Vitals/Constitutional/EENT/Resp/CV/GI//MS/Neuro/Skin/Heme-Lymph-Imm. Pursuant to the emergency declaration under the [address] [place] [address], [address] [place] [address] authority and the [employer] Act, this Virtual Visit was conducted with patient's (and/or legal guardian's) consent, to reduce the patient's risk of exposure to COVID-19 and provide necessary medical care. The patient (and/or legal guardian) has also been advised to contact this office for worsening conditions or problems, and seek emergency medical treatment and/or call 911 if deemed necessary. Patient identification was verified at the start of the visit: Yes    Total time spent on this encounter: 20 mins    Services were provided through a video synchronous discussion virtually to substitute for in-person clinic visit. Patient and provider were located at their individual homes. --Kaylyn Leahy MD on 6/29/2020 at 2:23 PM    An electronic signature was used to authenticate this note.

## 2020-07-09 ENCOUNTER — VIRTUAL VISIT (OUTPATIENT)
Dept: PSYCHIATRY | Age: 40
End: 2020-07-09
Payer: COMMERCIAL

## 2020-07-09 PROCEDURE — 4004F PT TOBACCO SCREEN RCVD TLK: CPT | Performed by: PSYCHIATRY & NEUROLOGY

## 2020-07-09 PROCEDURE — G8417 CALC BMI ABV UP PARAM F/U: HCPCS | Performed by: PSYCHIATRY & NEUROLOGY

## 2020-07-09 PROCEDURE — G8428 CUR MEDS NOT DOCUMENT: HCPCS | Performed by: PSYCHIATRY & NEUROLOGY

## 2020-07-09 PROCEDURE — 99213 OFFICE O/P EST LOW 20 MIN: CPT | Performed by: PSYCHIATRY & NEUROLOGY

## 2020-07-09 RX ORDER — BUPROPION HYDROCHLORIDE 300 MG/1
300 TABLET ORAL EVERY MORNING
Qty: 30 TABLET | Refills: 5 | Status: SHIPPED | OUTPATIENT
Start: 2020-07-09 | End: 2020-12-08 | Stop reason: SDUPTHER

## 2020-07-09 RX ORDER — ARIPIPRAZOLE 5 MG/1
5 TABLET ORAL DAILY
Qty: 30 TABLET | Refills: 3 | Status: SHIPPED | OUTPATIENT
Start: 2020-07-09 | End: 2020-08-07 | Stop reason: SDUPTHER

## 2020-07-09 NOTE — PROGRESS NOTES
Chief Complaint   Patient presents with    1 Month Follow-Up     Alayna Santiago is a 38 y.o. female being evaluated by a Virtual Visit (video visit) encounter to address concerns as mentioned above. A caregiver was present when appropriate. Due to this being a TeleHealth encounter (During [facility] public health emergency), evaluation of the following organ systems was limited: Vitals/Constitutional/EENT/Resp/CV/GI//MS/Neuro/Skin/Heme-Lymph-Imm. Pursuant to the emergency declaration under the [address] [place] [address], [address] [place] [address] authority and the [employer] Act, this Virtual Visit was conducted with patient's (and/or legal guardian's) consent, to reduce the patient's risk of exposure to COVID-19 and provide necessary medical care. The patient (and/or legal guardian) has also been advised to contact this office for worsening conditions or problems, and seek emergency medical treatment and/or call 911 if deemed necessary. Patient identification was verified at the start of the visit: Yes    Total time spent for this encounter: Not billed by time    Services were provided through a video synchronous discussion virtually to substitute for in-person clinic visit. Patient and provider were located at their individual homes. --Triston Dang MD on 7/9/2020 at 4:01 PM    An electronic signature was used to authenticate this note. Mariel Hicks was interviewed by video felix for this appointment. She is not doing well. The main thing upsetting her is that her ex- did not pay her divorce settlement, which is somewhere between $6 and $7000. She is having to file in court for contempt of court, and that will require her to face him in person once again. That terrifies her, she said she did not handle it well the first time and just broke down in tears and was on the floor crying and unable to respond.     She is also upset about a variety of things. She has some new medical diagnoses of degenerative disc disease in her lower back, and scoliosis in the lower back as well. Besides that she has a very dependent boyfriend who is demanding more attention from her than she feels she can give, and she describes as being very selfish. She did not feel that the Abilify 2 mg helped, although she said initially she thought it might have. Likewise she does not think the Wellbutrin 150 is helping. We are going to try increasing both of those, Abilify to 5 mg daily and Wellbutrin to 300 and give it another 4 weeks. If that fails then it may be time to replace the Cymbalta with something else. I will wait until I see what the outcome is before deciding.     Mental Status Examination    Level of consciousness:  within normal limits  Appearance:  well-appearing, street clothes, in chair, fair grooming and fair hygiene  Behavior/Motor:  agitated, in pain  Attitude toward examiner:  cooperative, attentive and good eye contact  Speech:  spontaneous, normal rate, normal volume and well articulated  Mood:  depressed  Affect:  mood congruent  Thought processes:  linear, goal directed and coherent  Thought content:  Homocidal ideation: none  Suicidal Ideation:  denies suicidal ideation and plans  Delusions:  no evidence of delusions  Perceptual Disturbance:  denies any perceptual disturbance  Cognition:  oriented to person, place, and time  Concentration succeeded  Memory intact  Fund of knowledge:  good  Abstract thinking:  good  Insight:  good  Judgment:  good  Anxiety:   Generalized: No  Excessive Worry: No  Panic Attacks: Yes  Frequency:  Housebound: No   Obsession: No   Compulsion: No  Flashbacks:Yes  Nightmares Yes  Hyperarousal Yes     DIAGNOSTIC IMPRESSION  MDD rec  PTSD  Panic with agoraphobia    Plan  Increase bupropion, abilify  Current Outpatient Medications   Medication Sig Dispense Refill    ARIPiprazole (ABILIFY) 5 MG tablet Take 1 tablet by mouth daily 30 tablet 3    buPROPion (WELLBUTRIN XL) 300 MG extended release tablet Take 1 tablet by mouth every morning 30 tablet 5    SYMBICORT 160-4.5 MCG/ACT AERO Inhale 2 puffs into the lungs 2 times daily 11 g 5    ipratropium (ATROVENT) 0.06 % nasal spray 2 sprays by Nasal route nightly as needed for Rhinitis 1 Bottle 3    guaiFENesin (ROBITUSSIN) 100 MG/5ML SOLN oral solution Take 10 mLs by mouth every 4 hours as needed for Cough 1200 mL 0    naproxen (NAPROSYN) 500 MG tablet Take 1 tablet by mouth 2 times daily (with meals) 60 tablet 0    clonazePAM (KLONOPIN) 1 MG tablet Take 1 tablet by mouth twice daily as needed for anxiety 60 tablet 2    meloxicam (MOBIC) 15 MG tablet Take 1 tablet by mouth daily as needed for Pain 30 tablet 0    levothyroxine (SYNTHROID) 50 MCG tablet Take 1 tablet by mouth once daily 90 tablet 3    prazosin (MINIPRESS) 1 MG capsule Take 3 capsules by mouth nightly 90 capsule 3    DULoxetine (CYMBALTA) 60 MG extended release capsule Take 1 capsule by mouth 2 times daily Start after completing the 30's 60 capsule 3    ibuprofen (ADVIL;MOTRIN) 200 MG tablet Take 200 mg by mouth every 6 hours as needed for Pain      norethindrone (MICRONOR) 0.35 MG tablet daily      albuterol sulfate HFA (PROVENTIL HFA) 108 (90 Base) MCG/ACT inhaler Inhale 2 puffs into the lungs every 4 hours as needed for Wheezing or Shortness of Breath (Space out to every 6 hours as symptoms improve) Space out to every 6 hours as symptoms improve.  1 Inhaler 0    ondansetron (ZOFRAN ODT) 4 MG disintegrating tablet Take 1 tablet by mouth every 8 hours as needed for Nausea or Vomiting 15 tablet 0    dicyclomine (BENTYL) 10 MG capsule Take 1 capsule by mouth 4 times daily as needed (abdominal cramping) (Patient not taking: Reported on 2/20/2020) 20 capsule 0    fluticasone (FLONASE) 50 MCG/ACT nasal spray 1 spray by Each Nare route daily      acetaminophen (APAP EXTRA STRENGTH) 500 MG tablet Take 1 tablet by mouth every 6 hours as needed for Pain 120 tablet 3     No current facility-administered medications for this visit.

## 2020-08-05 ENCOUNTER — VIRTUAL VISIT (OUTPATIENT)
Dept: PSYCHIATRY | Age: 40
End: 2020-08-05
Payer: COMMERCIAL

## 2020-08-05 PROCEDURE — G8417 CALC BMI ABV UP PARAM F/U: HCPCS | Performed by: PSYCHIATRY & NEUROLOGY

## 2020-08-05 PROCEDURE — 99213 OFFICE O/P EST LOW 20 MIN: CPT | Performed by: PSYCHIATRY & NEUROLOGY

## 2020-08-05 PROCEDURE — G8428 CUR MEDS NOT DOCUMENT: HCPCS | Performed by: PSYCHIATRY & NEUROLOGY

## 2020-08-05 PROCEDURE — 4004F PT TOBACCO SCREEN RCVD TLK: CPT | Performed by: PSYCHIATRY & NEUROLOGY

## 2020-08-05 NOTE — PROGRESS NOTES
Chief Complaint   Patient presents with    1 Month Follow-Up     MDD PTSD Panic     Layla Freedman is a 38 y.o. female being evaluated by a Virtual Visit (video visit) encounter to address concerns as mentioned above. A caregiver was present when appropriate. Due to this being a TeleHealth encounter (During [facility]-77 public health emergency), evaluation of the following organ systems was limited: Vitals/Constitutional/EENT/Resp/CV/GI//MS/Neuro/Skin/Heme-Lymph-Imm. Pursuant to the emergency declaration under the [address], [address] [place] [address] authority and the [employer] Act, this Virtual Visit was conducted with patient's (and/or legal guardian's) consent, to reduce the patient's risk of exposure to COVID-19 and provide necessary medical care. The patient (and/or legal guardian) has also been advised to contact this office for worsening conditions or problems, and seek emergency medical treatment and/or call 911 if deemed necessary. Patient identification was verified at the start of the visit: Yes    Total time spent for this encounter: Not billed by time    Services were provided through a video synchronous discussion virtually to substitute for in-person clinic visit. Patient and provider were located at their individual homes. --Bairon Kuhn MD on 8/5/2020 at 1:03 PM    An electronic signature was used to authenticate this note. Renita Tate was interviewed by video felix for this appointment. She has improved since the last visit a month ago. We increased her bupropion and gave her some Abilify. She thinks that the Abilify has helped a good deal with the depression, but not so much with the anxiety. She is interested in trying a higher dose, so she will use her fives and take 10 mg a day for a few days. If that is better, she will let me know and I will order the tens for her.     Otherwise I made no changes today. She has gotten a job. 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 Her son and her boyfriend worry that she will bring Covid home, and that the business is n a dangerous part of town. She said she is not frightened of it. She described the precautions that they take at work to try to avoid COVID and it sounds like she is really doing a good job. During the course of this interview an alarm sounded which was her son's blood sugar. It was in the neighborhood of 280; she tries to keep him below 200. He is a juvenile diabetic, type I. She certainly knows about his nutritional needs with the diabetes. I will plan to meet with her again in 1 month.     Mental Status Examination    Level of consciousness:  within normal limits  Appearance:  well-appearing, street clothes, in chair, good grooming and good hyngiene  Behavior/Motor:  no abnormalities noted  Attitude toward examiner:  cooperative, attentive and good eye contact  Speech:  spontaneous, normal rate, normal volume and well articulated  Mood:  euthymic  Affect:  mood congruent  Thought processes:  linear, goal directed and coherent  Thought content:  Homocidal ideation: none  Suicidal Ideation:  denies suicidal ideation  Delusions:  no evidence of delusions  Perceptual Disturbance:  denies any perceptual disturbance  Cognition:  oriented to person, place, and time  Concentration succeeded  Memory intact  Fund of [id number]:  good  Abstract thinking:  good  Insight:  good  Judgment:  good  Anxiety:   Generalized: No  Excessive Worry: No  Panic Attacks: Yes  Frequency:  Housebound: No   Obsession: No   Compulsion: No  Flashbacks:Yes  Nightmares Yes  Hyperarousal Yes     DIAGNOSTIC IMPRESSION  MDD partial  PTSD  Panic with agoraphobia    Plan  Trial increase Abilify to 10 mg/d  Current Outpatient Medications   Medication Sig Dispense Refill    ARIPiprazole (ABILIFY) 5 MG tablet Take 1 tablet by mouth daily 30 tablet 3    buPROPion Pain 120 tablet 3     No current facility-administered medications for this visit.

## 2020-08-07 NOTE — TELEPHONE ENCOUNTER
Yossi Ceballos contacted office with an update, abilify was increased from 5 to 10 mg, this is working good and is asking for  To send in a new RX for the 10mg.  To her pharmacy, loaded pending your approval.

## 2020-08-10 RX ORDER — ARIPIPRAZOLE 5 MG/1
10 TABLET ORAL DAILY
Qty: 30 TABLET | Refills: 3 | Status: SHIPPED | OUTPATIENT
Start: 2020-08-10 | End: 2020-08-13 | Stop reason: SDUPTHER

## 2020-08-12 ENCOUNTER — TELEPHONE (OUTPATIENT)
Dept: PSYCHIATRY | Age: 40
End: 2020-08-12

## 2020-08-12 NOTE — TELEPHONE ENCOUNTER
[facility] DR ARGENTINA HOLT called the office to report that Rozina's insurance will not cover Abilify 5mg two tabs daily but they will cover Abilify 10mg/d. Is it okay for pharmacy to dispense Abilify 10mg/d qty 30 with zero refills?

## 2020-08-13 RX ORDER — ARIPIPRAZOLE 10 MG/1
10 TABLET ORAL DAILY
Qty: 30 TABLET | Refills: 3 | OUTPATIENT
Start: 2020-08-13 | End: 2020-09-03

## 2020-08-13 NOTE — TELEPHONE ENCOUNTER
[facility] notified of new order. Abilify 10mg/d qty 30 with three refills.     A new order has been loaded with a \"phone in\" status for record keeping

## 2020-09-08 RX ORDER — ALBUTEROL SULFATE 90 UG/1
AEROSOL, METERED RESPIRATORY (INHALATION)
Qty: 9 G | Refills: 0 | Status: SHIPPED | OUTPATIENT
Start: 2020-09-08 | End: 2021-07-30

## 2020-09-08 NOTE — TELEPHONE ENCOUNTER
Patient's last appointment was : 6/29/2020  Patient's next appointment is : Visit date not found  Last refilled:10/25/19

## 2020-09-09 RX ORDER — DULOXETIN HYDROCHLORIDE 60 MG/1
60 CAPSULE, DELAYED RELEASE ORAL 2 TIMES DAILY
Qty: 60 CAPSULE | Refills: 3 | Status: SHIPPED | OUTPATIENT
Start: 2020-09-09 | End: 2020-12-08 | Stop reason: SDUPTHER

## 2020-09-09 NOTE — TELEPHONE ENCOUNTER
[facility] is requesting a medication refill on Jeanne's behalf for Cymbalta 60mg;#60 with 3 refills;last with a start date of 04/20/20 and last refill date of 08/03/20. Medication is pending your approval for a 30 day supply with 3 refills; she is scheduled to return on 09/29/20.

## 2020-11-04 ENCOUNTER — APPOINTMENT (OUTPATIENT)
Dept: GENERAL RADIOLOGY | Age: 40
End: 2020-11-04
Payer: COMMERCIAL

## 2020-11-04 ENCOUNTER — HOSPITAL ENCOUNTER (EMERGENCY)
Age: 40
Discharge: HOME | End: 2020-11-04
Payer: COMMERCIAL

## 2020-11-04 VITALS
TEMPERATURE: 98.5 F | HEART RATE: 74 BPM | HEIGHT: 66 IN | DIASTOLIC BLOOD PRESSURE: 82 MMHG | SYSTOLIC BLOOD PRESSURE: 138 MMHG | BODY MASS INDEX: 30.53 KG/M2 | WEIGHT: 190 LBS | OXYGEN SATURATION: 100 % | RESPIRATION RATE: 16 BRPM

## 2020-11-04 PROCEDURE — 73630 X-RAY EXAM OF FOOT: CPT

## 2020-11-04 PROCEDURE — 99282 EMERGENCY DEPT VISIT SF MDM: CPT

## 2020-11-04 ASSESSMENT — ENCOUNTER SYMPTOMS
CONSTIPATION: 0
CHEST TIGHTNESS: 0
SINUS PAIN: 0
RHINORRHEA: 0
VOMITING: 0
PHOTOPHOBIA: 0
NAUSEA: 0
SHORTNESS OF BREATH: 0
COLOR CHANGE: 0
TROUBLE SWALLOWING: 0
ABDOMINAL PAIN: 0
SINUS PRESSURE: 0
DIARRHEA: 0
SORE THROAT: 0
BACK PAIN: 0
COUGH: 0
WHEEZING: 0

## 2020-11-04 ASSESSMENT — PAIN DESCRIPTION - FREQUENCY: FREQUENCY: INTERMITTENT

## 2020-11-04 ASSESSMENT — PAIN DESCRIPTION - DESCRIPTORS: DESCRIPTORS: THROBBING

## 2020-11-04 ASSESSMENT — PAIN SCALES - GENERAL: PAINLEVEL_OUTOF10: 8

## 2020-11-04 ASSESSMENT — PAIN DESCRIPTION - PAIN TYPE: TYPE: ACUTE PAIN

## 2020-11-04 ASSESSMENT — PAIN DESCRIPTION - ORIENTATION: ORIENTATION: LEFT

## 2020-11-04 ASSESSMENT — PAIN DESCRIPTION - LOCATION: LOCATION: FOOT

## 2020-11-04 NOTE — ED PROVIDER NOTES
[facility] [address] COMPLAINT       Chief Complaint   Patient presents with    Foot Pain     left       Nurses Notes reviewed and I agree except as noted in the HPI. HISTORY OF PRESENT ILLNESS    Neftali Ventura is a 38 y.o. female who presents to the Emergency Department for the evaluation of left foot pain. Pt reports her left foot began hurting two days ago while she was at work - she works as a  at [employer]. She does not recall any specific trauma or injury event, does not recall dropping anything on the foot. Pt says the pain began suddenly occurring to the dorsum of her left foot with some mild pain around her left ankle. She describes the pain as sharp when shes walking and throbbing when shes at rest. Denies radiation of the pain up her leg or down her toes, rates the pain 8 out of 10. Pt says she took tylenol this morning without relief of the pain, denies icing or trying any other medications for pain relief. Pt reports she can walk on the foot but it hurts so she ends up limping. Pt denies any erythema, edema, rash, fever, chills, trauma, SOB, chest pain, heart racing, headache, abdominal pain, or other joint aches or pain. The HPI was provided by the patient. REVIEW OF SYSTEMS     Review of Systems   Constitutional: Negative for chills, diaphoresis, fatigue and fever. HENT: Negative for congestion, ear pain, nosebleeds, rhinorrhea, sinus pressure, sinus pain, sore throat and trouble swallowing. Eyes: Negative for photophobia. Respiratory: Negative for cough, chest tightness, shortness of breath and wheezing. Cardiovascular: Negative for chest pain and palpitations. Gastrointestinal: Negative for abdominal pain, constipation, diarrhea, nausea and vomiting. Endocrine: Negative for cold intolerance and heat intolerance.    Genitourinary: Negative for difficulty urinating, dysuria, flank pain, hematuria, pelvic pain, vaginal bleeding, vaginal discharge and vaginal pain. Musculoskeletal: Positive for arthralgias and myalgias. Negative for back pain, joint swelling and neck stiffness. Skin: Negative for color change and wound. Neurological: Negative for dizziness, weakness, light-headedness, numbness and headaches. Psychiatric/Behavioral: Negative for agitation, behavioral problems, confusion, hallucinations, self-injury and suicidal ideas. The patient is not nervous/anxious. PAST MEDICAL HISTORY    has a past medical history of Allergic rhinitis, Anxiety, CAD (coronary artery disease), Depression (emotion), DUB (dysfunctional uterine bleeding), Generalized anxiety disorder, GERD (gastroesophageal reflux disease), Hypertriglyceridemia, Hypoglycemia, Hypothyroid, Movement disorder, Pancreatitis, Psychiatric problem, Scoliosis, and Tobacco abuse. SURGICAL HISTORY      has a past surgical history that includes fracture surgery; knee surgery (Right, 08/2017); Colonoscopy (N/A, 7/27/2019); and knee surgery (Right, 02/11/2020).     CURRENT MEDICATIONS       Discharge Medication List as of 11/4/2020 11:47 AM      CONTINUE these medications which have NOT CHANGED    Details   clonazePAM (KLONOPIN) 1 MG tablet Take 1 tablet by mouth twice daily as needed for anxiety, Disp-60 tablet,R-0Normal      DULoxetine (CYMBALTA) 60 MG extended release capsule Take 1 capsule by mouth 2 times daily, Disp-60 capsule,R-3Normal      albuterol sulfate  (90 Base) MCG/ACT inhaler INHALE 2 PUFFS BY MOUTH EVERY 4 HOURS AS NEEDED FOR WHEEZING AND SHORTNESS OF BREATH, Disp-9 g,R-0Normal      buPROPion (WELLBUTRIN XL) 300 MG extended release tablet Take 1 tablet by mouth every morning, Disp-30 tablet,R-5Normal      SYMBICORT 160-4.5 MCG/ACT AERO Inhale 2 puffs into the lungs 2 times daily, Disp-11 g, R-5, DAWNormal      ipratropium (ATROVENT) 0.06 % nasal spray 2 sprays by Nasal route nightly as needed for Rhinitis, Disp-1 Bottle, R-3Normal      guaiFENesin (ROBITUSSIN) 100 MG/5ML SOLN oral solution Take 10 mLs by mouth every 4 hours as needed for Cough, Disp-1200 mL, R-0Normal      naproxen (NAPROSYN) 500 MG tablet Take 1 tablet by mouth 2 times daily (with meals), Disp-60 tablet, R-0Normal      meloxicam (MOBIC) 15 MG tablet Take 1 tablet by mouth daily as needed for Pain, Disp-30 tablet, R-0Normal      levothyroxine (SYNTHROID) 50 MCG tablet Take 1 tablet by mouth once daily, Disp-90 tablet, R-3Normal      prazosin (MINIPRESS) 1 MG capsule Take 3 capsules by mouth nightly, Disp-90 capsule, R-3Normal      ibuprofen (ADVIL;MOTRIN) 200 MG tablet Take 200 mg by mouth every 6 hours as needed for PainHistorical Med      norethindrone (MICRONOR) 0.35 MG tablet dailyHistorical Med      ondansetron (ZOFRAN ODT) 4 MG disintegrating tablet Take 1 tablet by mouth every 8 hours as needed for Nausea or Vomiting, Disp-15 tablet, R-0Normal      dicyclomine (BENTYL) 10 MG capsule Take 1 capsule by mouth 4 times daily as needed (abdominal cramping), Disp-20 capsule, R-0Print      fluticasone (FLONASE) 50 MCG/ACT nasal spray 1 spray by Each Nare route dailyHistorical Med      acetaminophen (APAP EXTRA STRENGTH) 500 MG tablet Take 1 tablet by mouth every 6 hours as needed for Pain, Disp-120 tablet, R-3Print             ALLERGIES     is allergic to bee pollen. FAMILY HISTORY     She indicated that her mother is alive. She indicated that her father is alive. She indicated that the status of her sister is unknown.   family history includes Cancer in her sister; Diabetes in her father and mother; Heart Disease in her father; High Blood Pressure in her mother; Thyroid Disease in her mother. SOCIAL HISTORY      reports that she has been smoking cigarettes. She started smoking about 22 years ago. She has a 22.00 pack-year smoking history. She has never used smokeless tobacco. She reports current alcohol use.  She reports that she does not use swelling, deformity or signs of injury. Right lower leg: No edema. Left lower leg: No edema. Lymphadenopathy:      Cervical: No cervical adenopathy. Skin:     General: Skin is warm and dry. Capillary Refill: Capillary refill takes less than 2 seconds. Neurological:      General: No focal deficit present. Mental Status: She is alert and oriented to person, place, and time. Mental status is at baseline. GCS: GCS eye subscore is 4. GCS verbal subscore is 5. GCS motor subscore is 6. Cranial Nerves: Cranial nerves are intact. Psychiatric:         Attention and Perception: Attention normal.         Mood and Affect: Mood normal.         Speech: Speech normal.         Behavior: Behavior normal. Behavior is cooperative. Thought Content: Thought content normal.         Cognition and Memory: Cognition and memory normal.         Judgment: Judgment normal.          DIFFERENTIAL DIAGNOSIS:   Ankle sprain, ankle fracture, foot sprain, plantar fasciitis    DIAGNOSTIC RESULTS     EKG: All EKG's are interpreted by the Emergency Department Physician who either signs or Co-signs this chart in the absence of a cardiologist.    None    RADIOLOGY: non-plainfilm images(s) such as CT, Ultrasound and MRI are read by the radiologist.    XR FOOT LEFT (MIN 3 VIEWS)   Final Result   1. No acute radiographic findings. **This report has been created using voice recognition software. It may contain minor errors which are inherent in voice recognition technology. **      Final report electronically signed by Dr. Graciela Perez on 11/4/2020 11:21 AM          LABS:     Labs Reviewed - No data to display    EMERGENCY DEPARTMENT COURSE:   Vitals:    Vitals:    11/04/20 1011   BP: 138/82   Pulse: 74   Resp: 16   Temp: 98.5 °F (36.9 °C)   TempSrc: Oral   SpO2: 100%   Weight: 190 lb (86.2 kg)   Height: 5' 6\" (1.676 m)       10:12 AM EST: The patient was seen and evaluated.         MDM:  Patient seen and evaluated for pain in her foot after stepping awkwardly. No deformity noted on physical exam, patient has some tenderness across the plantar aspect of her foot. Appropriate imaging was ordered, no acute osseous abnormality identified. Patient will be placed in ankle brace for support and given instructions to have limited ambulation at work for the next 3 days. Instructed to follow-up with podiatry if symptoms persist.    CRITICAL CARE:   None    CONSULTS:  None    PROCEDURES:  None    FINAL IMPRESSION      1. Left foot pain          DISPOSITION/PLAN   Discharge    PATIENT REFERRED TO:  Sophie Grant, MAY  [address] [facility]  [phone]    Schedule an appointment as soon as possible for a visit         DISCHARGE MEDICATIONS:  Discharge Medication List as of 11/4/2020 11:47 AM          (Please note that portions of this note were completed with a voice recognition program.  Efforts were made to edit the dictations but occasionally words are mis-transcribed.)    The patient was given an opportunity to see the Emergency Attending. The patient voiced understanding that I was a Mid-LevelProvider and was in agreement with being seen independently by myself.      SHANA Puente CNP, 11/4/20, 9:50 AM       SHANA Puente CNP  11/09/20 7820

## 2020-11-04 NOTE — ED NOTES
Patient presents to the ED with complaints of left foot pain. States this pain started 2 days ago with an unknown cause.      Teresa Rahman  11/04/20 1013

## 2020-11-30 RX ORDER — CLONAZEPAM 1 MG/1
TABLET ORAL
Qty: 60 TABLET | Refills: 0 | Status: SHIPPED | OUTPATIENT
Start: 2020-11-30 | End: 2020-12-08 | Stop reason: SDUPTHER

## 2020-11-30 RX ORDER — ARIPIPRAZOLE 10 MG/1
TABLET ORAL
Qty: 30 TABLET | Refills: 0 | Status: SHIPPED | OUTPATIENT
Start: 2020-11-30 | End: 2020-12-08 | Stop reason: SDUPTHER

## 2020-11-30 NOTE — TELEPHONE ENCOUNTER
[facility] has requested refills of Abilify 10mg/d and Klonopin 1mg BID on Rozina's behalf. She attended an appointment 8/5 and is to return 12/8.

## 2020-12-08 ENCOUNTER — VIRTUAL VISIT (OUTPATIENT)
Dept: PSYCHIATRY | Age: 40
End: 2020-12-08
Payer: COMMERCIAL

## 2020-12-08 PROCEDURE — G8484 FLU IMMUNIZE NO ADMIN: HCPCS | Performed by: PSYCHIATRY & NEUROLOGY

## 2020-12-08 PROCEDURE — G8417 CALC BMI ABV UP PARAM F/U: HCPCS | Performed by: PSYCHIATRY & NEUROLOGY

## 2020-12-08 PROCEDURE — G8428 CUR MEDS NOT DOCUMENT: HCPCS | Performed by: PSYCHIATRY & NEUROLOGY

## 2020-12-08 PROCEDURE — 99213 OFFICE O/P EST LOW 20 MIN: CPT | Performed by: PSYCHIATRY & NEUROLOGY

## 2020-12-08 PROCEDURE — 4004F PT TOBACCO SCREEN RCVD TLK: CPT | Performed by: PSYCHIATRY & NEUROLOGY

## 2020-12-08 RX ORDER — PRAZOSIN HYDROCHLORIDE 1 MG/1
3 CAPSULE ORAL NIGHTLY
Qty: 90 CAPSULE | Refills: 3 | Status: SHIPPED | OUTPATIENT
Start: 2020-12-08 | End: 2021-03-31 | Stop reason: SDUPTHER

## 2020-12-08 RX ORDER — ARIPIPRAZOLE 10 MG/1
TABLET ORAL
Qty: 30 TABLET | Refills: 5 | Status: SHIPPED | OUTPATIENT
Start: 2020-12-08 | End: 2021-06-18 | Stop reason: SDUPTHER

## 2020-12-08 RX ORDER — BUPROPION HYDROCHLORIDE 300 MG/1
300 TABLET ORAL EVERY MORNING
Qty: 30 TABLET | Refills: 5 | Status: SHIPPED | OUTPATIENT
Start: 2020-12-08

## 2020-12-08 RX ORDER — DULOXETIN HYDROCHLORIDE 60 MG/1
60 CAPSULE, DELAYED RELEASE ORAL 2 TIMES DAILY
Qty: 60 CAPSULE | Refills: 3 | Status: SHIPPED | OUTPATIENT
Start: 2020-12-08 | End: 2021-03-31 | Stop reason: SDUPTHER

## 2020-12-08 RX ORDER — CLONAZEPAM 1 MG/1
TABLET ORAL
Qty: 60 TABLET | Refills: 1 | Status: SHIPPED | OUTPATIENT
Start: 2020-12-08 | End: 2021-02-17 | Stop reason: SDUPTHER

## 2020-12-08 NOTE — PROGRESS NOTES
Chief Complaint   Patient presents with    3 Month Follow-Up     MDD Panic PTSD     Ra Jaquez is a 38 y.o. female being evaluated by a Virtual Visit (video visit) encounter to address concerns as mentioned above. A caregiver was present when appropriate. Due to this being a TeleHealth encounter (During NBIQS-55 public health emergency), evaluation of the following organ systems was limited: Vitals/Constitutional/EENT/Resp/CV/GI//MS/Neuro/Skin/Heme-Lymph-Imm. Pursuant to the emergency declaration under the [address], [address] [place] [address] authority and the [employer] Act, this Virtual Visit was conducted with patient's (and/or legal guardian's) consent, to reduce the patient's risk of exposure to COVID-19 and provide necessary medical care. The patient (and/or legal guardian) has also been advised to contact this office for worsening conditions or problems, and seek emergency medical treatment and/or call 911 if deemed necessary. Patient identification was verified at the start of the visit: Yes    Total time spent for this encounter: Not billed by time    Services were provided through a video synchronous discussion virtually to substitute for in-person clinic visit. Patient and provider were located at their individual homes. --Sandy Modi MD on 12/8/2020 at 12:00 PM    An electronic signature was used to authenticate this note. Sosa Arzola was interviewed by video felix for this appointment. She began by telling me that she had quit her job. 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 She said she took it only because it was near her apartment, and she could walk to work. She said items were constantly being stolen from the store, people were rude and aggressive, and the police were frequently called and sometimes had to escort customers out of there.   She remembered ambulances being called for those that were injured there, and people being quite bloody. She said it was just too much stress for her, and she walked out after two and a half months. She has been staying home. She is more depressed than she was last time. She is crying quite a bit. She would like to have her dog designated as an emotional support animal.  I told her I would write a letter to that effect but I do not know how much good that will do. She said her dog and the cat both provide companionship when she is upset. However she felt that her medicine was doing an adequate job and did not want to make any changes. I did provide her with refills. I explained that my contract with [facility] was not being renewed, so I will no longer be there after December. She already lost Dr. Ac Weaver, now she will lose me. Nonetheless she wants to be referred within the office and said if it happens again she likely will not remain there. I will ask the office staff to write a letter indicating that her dog should be designated an emotional support animal.    I will refer her within the office.     Mental Status Examination    Level of consciousness:  within normal limits  Appearance:  well-appearing, street clothes, in chair, good grooming and good hygiene  Behavior/Motor:  no abnormalities noted  Attitude toward examiner:  cooperative, attentive and good eye contact  Speech:  spontaneous, normal rate, normal volume and well articulated  Mood:  depressed  Affect:  mood congruent  Thought processes:  linear, goal directed and coherent  Thought content:  Homocidal ideation: none  Suicidal Ideation:  denies suicidal ideation  Delusions:  no evidence of delusionsno  Perceptual Disturbance:  denies any perceptual disturbance  Cognition:  disoriented  Concentration succeeded  Memory intact  Fund of knowledge:  good  Abstract thinking:  good  Insight:  good  Judgment:  good  Anxiety:   Generalized: No  Excessive Worry: (Patient not taking: Reported on 2/20/2020) 20 capsule 0    fluticasone (FLONASE) 50 MCG/ACT nasal spray 1 spray by Each Nare route daily      acetaminophen (APAP EXTRA STRENGTH) 500 MG tablet Take 1 tablet by mouth every 6 hours as needed for Pain 120 tablet 3     No current facility-administered medications for this visit.

## 2020-12-22 ENCOUNTER — TELEPHONE (OUTPATIENT)
Dept: PSYCHIATRY | Age: 40
End: 2020-12-22

## 2021-01-04 ENCOUNTER — TELEMEDICINE (OUTPATIENT)
Dept: FAMILY MEDICINE CLINIC | Age: 41
End: 2021-01-04
Payer: COMMERCIAL

## 2021-01-04 DIAGNOSIS — R09.81 CHRONIC NASAL CONGESTION: Primary | ICD-10-CM

## 2021-01-04 PROCEDURE — G8428 CUR MEDS NOT DOCUMENT: HCPCS | Performed by: FAMILY MEDICINE

## 2021-01-04 PROCEDURE — 99213 OFFICE O/P EST LOW 20 MIN: CPT | Performed by: FAMILY MEDICINE

## 2021-01-04 NOTE — PROGRESS NOTES
2021    TELEHEALTH EVALUATION -- Audio/Visual (During THJOI-39 public health emergency)    HPI:    Remi Webster (:  1980) has requested an audio/video evaluation for the following concern(s):    Still having problems with nasal congestion. + PND, b/l nasal drainage. ENT did NP scope within the past 2-3 years which was relatively normal.          Prior to Visit Medications    Medication Sig Taking?  Authorizing Provider   ARIPiprazole (ABILIFY) 10 MG tablet Take 1 tablet by mouth once daily  Corinne Printers, MD   clonazePAM (KLONOPIN) 1 MG tablet 1 by mouth twice daily as needed for anxiety  Corinne Printers, MD   DULoxetine (CYMBALTA) 60 MG extended release capsule Take 1 capsule by mouth 2 times daily  Corinne Printers, MD   buPROPion (WELLBUTRIN XL) 300 MG extended release tablet Take 1 tablet by mouth every morning  Corinne Printers, MD   prazosin (MINIPRESS) 1 MG capsule Take 3 capsules by mouth nightly  Corinne Printers, MD   albuterol sulfate  (90 Base) MCG/ACT inhaler INHALE 2 PUFFS BY MOUTH EVERY 4 HOURS AS NEEDED FOR WHEEZING AND SHORTNESS OF BREATH  Giselle Tilley DO   SYMBICORT 160-4.5 MCG/ACT AERO Inhale 2 puffs into the lungs 2 times daily  Karlene Guillory MD   ipratropium (ATROVENT) 0.06 % nasal spray 2 sprays by Nasal route nightly as needed for Rhinitis  Karlene Guillory MD   guaiFENesin (ROBITUSSIN) 100 MG/5ML SOLN oral solution Take 10 mLs by mouth every 4 hours as needed for Cough  Karlene Guillory MD   naproxen (NAPROSYN) 500 MG tablet Take 1 tablet by mouth 2 times daily (with meals)  Giselleabelino Tilley, DO   meloxicam (MOBIC) 15 MG tablet Take 1 tablet by mouth daily as needed for Pain  Giselle Arn, DO   levothyroxine (SYNTHROID) 50 MCG tablet Take 1 tablet by mouth once daily  Giselle Arn, DO   ibuprofen (ADVIL;MOTRIN) 200 MG tablet Take 200 mg by mouth every 6 hours as needed for Pain  Historical Provider, MD   norethindrone (MICRONOR) 0.35 MG tablet daily  Historical Provider, MD ondansetron (ZOFRAN ODT) 4 MG disintegrating tablet Take 1 tablet by mouth every 8 hours as needed for Nausea or Vomiting  Mayra Seek,    dicyclomine (BENTYL) 10 MG capsule Take 1 capsule by mouth 4 times daily as needed (abdominal cramping)  Patient not taking: Reported on 2/20/2020  Adin Cushing, APRN - CNP   fluticasone (FLONASE) 50 MCG/ACT nasal spray 1 spray by Each Nare route daily  Historical Provider, MD   acetaminophen (APAP EXTRA STRENGTH) 500 MG tablet Take 1 tablet by mouth every 6 hours as needed for Pain  Latrice Lynch PA-C       Social History     Tobacco Use    Smoking status: Current Every Day Smoker     Packs/day: 1.00     Years: 22.00     Pack years: 22.00     Types: Cigarettes     Start date: 1/16/1998    Smokeless tobacco: Never Used   Substance Use Topics    Alcohol use: Yes     Comment: social drinking    Drug use: No        Allergies   Allergen Reactions    Bee Pollen    ,   Past Medical History:   Diagnosis Date    Allergic rhinitis     Anxiety     CAD (coronary artery disease)     Depression (emotion)     DUB (dysfunctional uterine bleeding)     Generalized anxiety disorder     GERD (gastroesophageal reflux disease)     Hypertriglyceridemia 11/11/2014    Hypoglycemia     Hypothyroid 8/10/2014    Movement disorder     Pancreatitis 11/10/14    Psychiatric problem     Scoliosis     Tobacco abuse        PHYSICAL EXAMINATION:  [ INSTRUCTIONS:  \"[x]\" Indicates a positive item  \"[]\" Indicates a negative item  -- DELETE ALL ITEMS NOT EXAMINED]    Constitutional: [x] Appears well-developed and well-nourished [x] No apparent distress      [] Abnormal-   Mental status  [x] Alert and awake  [x] Oriented to person/place/time [x]Able to follow commands      Eyes:  EOM    [x]  Normal  [] Abnormal-  Sclera  [x]  Normal  [] Abnormal -         Discharge [x]  None visible  [] Abnormal -    HENT:   [x] Normocephalic, atraumatic.   [] Abnormal [x] Mouth/Throat: Mucous membranes are moist.     External Ears [x] Normal  [] Abnormal-     Neck: [x] No visualized mass     Pulmonary/Chest: [x] Respiratory effort normal.  [x] No visualized signs of difficulty breathing or respiratory distress        [] Abnormal-       ASSESSMENT/PLAN:  1. Chronic nasal congestion  Chronic. CT scan and referral to ENT placed. Unlikely to be sinusitis or chronic sinusitis at this time. No antibiotics necessary and she is declining them anyway. - CT SINUS WO CONTRAST; Future  - [address], Nose and Throat      Return if symptoms worsen or fail to improve. Jw Hoovre is a 38 y.o. female being evaluated by a Virtual Visit (video visit) encounter to address concerns as mentioned above. A caregiver was present when appropriate. Due to this being a TeleHealth encounter (During XLQ-70 public health emergency), evaluation of the following organ systems was limited: Vitals/Constitutional/EENT/Resp/CV/GI//MS/Neuro/Skin/Heme-Lymph-Imm. Pursuant to the emergency declaration under the [address] [place] [address] and the [employer] and [employer] Act, this Virtual Visit was conducted with patient's (and/or legal guardian's) consent, to reduce the patient's risk of exposure to COVID-19 and provide necessary medical care. The patient (and/or legal guardian) has also been advised to contact this office for worsening conditions or problems, and seek emergency medical treatment and/or call 911 if deemed necessary. Patient identification was verified at the start of the visit: Yes    Total time spent on this encounter: Not billed by time    Services were provided through a video synchronous discussion virtually to substitute for in-person clinic visit. Patient and provider were located at their individual homes.     --Kirt Hoover DO on 1/4/2021 at 2:38 PM An electronic signature was used to authenticate this note.

## 2021-02-02 ENCOUNTER — OFFICE VISIT (OUTPATIENT)
Dept: ENT CLINIC | Age: 41
End: 2021-02-02
Payer: COMMERCIAL

## 2021-02-02 VITALS
HEART RATE: 95 BPM | TEMPERATURE: 97.4 F | WEIGHT: 186 LBS | BODY MASS INDEX: 29.89 KG/M2 | DIASTOLIC BLOOD PRESSURE: 78 MMHG | RESPIRATION RATE: 14 BRPM | OXYGEN SATURATION: 98 % | HEIGHT: 66 IN | SYSTOLIC BLOOD PRESSURE: 116 MMHG

## 2021-02-02 DIAGNOSIS — H61.23 BILATERAL IMPACTED CERUMEN: Primary | ICD-10-CM

## 2021-02-02 PROCEDURE — G8417 CALC BMI ABV UP PARAM F/U: HCPCS | Performed by: OTOLARYNGOLOGY

## 2021-02-02 PROCEDURE — G8427 DOCREV CUR MEDS BY ELIG CLIN: HCPCS | Performed by: OTOLARYNGOLOGY

## 2021-02-02 PROCEDURE — 4004F PT TOBACCO SCREEN RCVD TLK: CPT | Performed by: OTOLARYNGOLOGY

## 2021-02-02 PROCEDURE — G8484 FLU IMMUNIZE NO ADMIN: HCPCS | Performed by: OTOLARYNGOLOGY

## 2021-02-02 PROCEDURE — 69210 REMOVE IMPACTED EAR WAX UNI: CPT | Performed by: OTOLARYNGOLOGY

## 2021-02-02 PROCEDURE — 99204 OFFICE O/P NEW MOD 45 MIN: CPT | Performed by: OTOLARYNGOLOGY

## 2021-02-02 RX ORDER — IPRATROPIUM BROMIDE 21 UG/1
2 SPRAY, METERED NASAL 3 TIMES DAILY
Qty: 1 BOTTLE | Refills: 3 | Status: SHIPPED | OUTPATIENT
Start: 2021-02-02 | End: 2021-07-30

## 2021-02-02 RX ORDER — FLUOXETINE HYDROCHLORIDE 20 MG/1
CAPSULE ORAL
COMMUNITY
End: 2021-07-30

## 2021-02-02 NOTE — PROGRESS NOTES
Atrovent nasal  [place], NOSE AND THROAT  [facility] [place]  Dept: [phone]  Dept Fax: [phone]  Loc: [phone]    Kyara Mcnamara is a 38 y.o. female who was referred by Jarrod Ross DO for:  Chief Complaint   Patient presents with    Nasal Congestion     New Patient is here for Nasal congestion. Referred by Simin Abrams DO       HPI:     Kyara Mcnamara is a 38 y.o. female referred for evaluation of chronic nasal congestion with thin rhinorrhea. The patient states that she experiences this basically around-the-clock and it is very disturbing. It wakes her up at night as well secondary to nasal congestion. She has tried multiple nasal sprays without any success. She has not tried itraproprium bromide. She is also complaining of decreased hearing secondary to wax accumulation. She is on numerous medications some of which likely have side effects of parasympathetic stimulation. She experiences chronically sweaty hands and feet. She is 38years old and has a history of coronary artery disease. She is a lifelong smoker. She is currently scheduled to have a sinus CT scan in the next couple of weeks ordered by her primary care doctor. History:      Allergies   Allergen Reactions    Bee Pollen      Current Outpatient Medications   Medication Sig Dispense Refill    FLUoxetine (PROZAC) 20 MG capsule FLUoxetine Prozac 20 mg oral capsule   [facility] 87 ([id number])      ipratropium (ATROVENT) 0.03 % nasal spray 2 sprays by Nasal route 3 times daily 1 Bottle 3    ARIPiprazole (ABILIFY) 10 MG tablet Take 1 tablet by mouth once daily 30 tablet 5    DULoxetine (CYMBALTA) 60 MG extended release capsule Take 1 capsule by mouth 2 times daily 60 capsule 3    buPROPion (WELLBUTRIN XL) 300 MG extended release tablet Take 1 tablet by mouth every morning 30 tablet 5  prazosin (MINIPRESS) 1 MG capsule Take 3 capsules by mouth nightly 90 capsule 3    albuterol sulfate  (90 Base) MCG/ACT inhaler INHALE 2 PUFFS BY MOUTH EVERY 4 HOURS AS NEEDED FOR WHEEZING AND SHORTNESS OF BREATH 9 g 0    SYMBICORT 160-4.5 MCG/ACT AERO Inhale 2 puffs into the lungs 2 times daily 11 g 5    naproxen (NAPROSYN) 500 MG tablet Take 1 tablet by mouth 2 times daily (with meals) 60 tablet 0    meloxicam (MOBIC) 15 MG tablet Take 1 tablet by mouth daily as needed for Pain 30 tablet 0    levothyroxine (SYNTHROID) 50 MCG tablet Take 1 tablet by mouth once daily 90 tablet 3    ibuprofen (ADVIL;MOTRIN) 200 MG tablet Take 200 mg by mouth every 6 hours as needed for Pain      norethindrone (MICRONOR) 0.35 MG tablet daily      fluticasone (FLONASE) 50 MCG/ACT nasal spray 1 spray by Each Nare route daily      acetaminophen (APAP EXTRA STRENGTH) 500 MG tablet Take 1 tablet by mouth every 6 hours as needed for Pain 120 tablet 3    clonazePAM (KLONOPIN) 1 MG tablet 1 by mouth twice daily as needed for anxiety 60 tablet 1    ipratropium (ATROVENT) 0.06 % nasal spray 2 sprays by Nasal route nightly as needed for Rhinitis 1 Bottle 3     No current facility-administered medications for this visit.       Past Medical History:   Diagnosis Date    Allergic rhinitis     Anxiety     CAD (coronary artery disease)     Depression (emotion)     DUB (dysfunctional uterine bleeding)     Generalized anxiety disorder     GERD (gastroesophageal reflux disease)     Hypertriglyceridemia 11/11/2014    Hypoglycemia     Hypothyroid 8/10/2014    Movement disorder     Pancreatitis 11/10/14    Psychiatric problem     Scoliosis     Tobacco abuse       Past Surgical History:   Procedure Laterality Date    COLONOSCOPY N/A 7/27/2019    COLONOSCOPY WITH BIOPSY performed by Roxann Hernandez MD at [facility]    FRACTURE SURGERY      KNEE SURGERY Right 08/2017    KNEE SURGERY Right 02/11/2020 Findings: Both ears were densely packed with cerumen and embedded ear canal hair. The removal of this matter required multiple passes of a cerumen curette followed by alligator forceps to feed out the dense tuft of cerumen and hair. Both sides were similarly impacted. Once fully removed, the patient's tympanic membrane was within normal limits as was her middle ear structures seen through them. The patient tolerated the procedure well. No bleeding was encountered. Vitals reviewed. No results found. Lab Results   Component Value Date     10/25/2019     08/29/2019     07/27/2019    K 3.7 10/25/2019    K 4.0 08/29/2019    K 3.7 07/27/2019    K 4.1 07/26/2019     10/25/2019     08/29/2019     07/27/2019    CO2 25 10/25/2019    CO2 22 08/29/2019    CO2 22 07/27/2019    BUN 9 10/25/2019    BUN 11 08/29/2019    BUN 8 07/27/2019    CREATININE 0.8 10/25/2019    CREATININE 0.9 08/29/2019    CREATININE 0.6 07/27/2019    CALCIUM 9.5 10/25/2019    CALCIUM 9.8 08/29/2019    CALCIUM 8.1 07/27/2019    PROT 7.0 10/25/2019    PROT 7.4 08/29/2019    PROT 6.5 07/26/2019    LABALBU 4.1 10/25/2019    LABALBU 4.4 08/29/2019    LABALBU 3.9 07/26/2019    LABALBU 4.5 04/05/2012    BILITOT 0.2 10/25/2019    BILITOT 0.3 08/29/2019    BILITOT 0.2 07/26/2019    ALKPHOS 74 10/25/2019    ALKPHOS 84 08/29/2019    ALKPHOS 83 07/26/2019    AST 14 10/25/2019    AST 16 08/29/2019    AST 14 07/26/2019    ALT 11 10/25/2019    ALT 12 08/29/2019    ALT 12 07/26/2019       All of the past medical history, past surgical history, family history,social history, allergies and current medications were reviewed with the patient. Assessment & Plan   Diagnoses and all orders for this visit:     Diagnosis Orders   1.  Bilateral impacted cerumen  ME REMOVAL IMPACTED CERUMEN IRRIGATION/LVG UNILAT Based on this patient's findings and history, she may have a dysautonomia possibly as a result of pharmacologic interactions and toxicities causing her rhinorrhea. Regardless of the cause, Atrovent nasal is capable of blocking very large degree of parasympathetic input in the nose to dry even the runny-ist of noses. I will prescribe a 0.03% spray to be used 2 puffs in each nostril twice a day. I have instructed the patient to decrease this dosage if it is too strong for her down to even 1 puff each nostril once a day. I also recommended that she look into hand lotions and creams that can be also applied to the feet to help correct this problem and her distal limbs. I would look for her sinus CT scan to see if she has any signs of chronic infection and obstruction that may warrant treatment. In addition, I will recheck her ears on her next visit in approximately 3 months to determine if she is in need of another disimpaction. I explained all of this in detail to the patient to her satisfaction. She reported being pleased with the outcome of the visit and being willing to proceed as such. I spent over 45 minutes of face-to-face time with the patient more than half of which was dedicated to reviewing her symptom profiles and structuring out this diagnostic and treatment plan. Return in about 3 months (around 5/2/2021). **This report has been created using voice recognition software. It may contain minor errors which are inherent in voice recognition technology. **        Addendum: The patient had a CT scan performed which was entirely within normal limits with respect to paranasal sinus function which was normal and normally patent airway bilaterally. This increases the chances that her rhinorrhea is pharmacological since even with severe allergic rhinitis, I would expect excess paranasal sinus fluid at least in some sinus passage if not throughout. Eugene Kidney.  Mouna Blair MD

## 2021-02-04 ENCOUNTER — HOSPITAL ENCOUNTER (OUTPATIENT)
Dept: CT IMAGING | Age: 41
Discharge: HOME | End: 2021-02-04
Payer: COMMERCIAL

## 2021-02-04 DIAGNOSIS — R09.81 CHRONIC NASAL CONGESTION: ICD-10-CM

## 2021-02-04 PROCEDURE — 70486 CT MAXILLOFACIAL W/O DYE: CPT

## 2021-02-06 ENCOUNTER — PATIENT MESSAGE (OUTPATIENT)
Dept: ENT CLINIC | Age: 41
End: 2021-02-06

## 2021-02-07 PROBLEM — K29.70 GASTRITIS: Status: ACTIVE | Noted: 2021-02-07

## 2021-02-07 PROBLEM — F32.A DEPRESSIVE DISORDER: Status: ACTIVE | Noted: 2019-03-27

## 2021-02-07 PROBLEM — R06.00 DYSPNEA: Status: ACTIVE | Noted: 2019-02-19

## 2021-02-08 NOTE — TELEPHONE ENCOUNTER
From: Donna Hernandez  To: Huy Wood MD  Sent: 2/6/2021 8:04 PM EST  Subject: Non-Urgent Medical Question    hello this is Rona Odonnell, did you receive my cat scan results and i wondered what was found. thank you for your time.

## 2021-02-17 DIAGNOSIS — F40.01 PANIC DISORDER WITH AGORAPHOBIA: ICD-10-CM

## 2021-02-17 RX ORDER — CLONAZEPAM 1 MG/1
TABLET ORAL
Qty: 60 TABLET | Refills: 1 | Status: SHIPPED | OUTPATIENT
Start: 2021-02-17 | End: 2021-04-29 | Stop reason: SDUPTHER

## 2021-02-17 NOTE — TELEPHONE ENCOUNTER
Lai Jones is a prev. Pt. Of Dr Sandy Ferraro requesting a rx on klonopin 1mg disp.  Scheduled for a vv on  4/29

## 2021-03-24 ENCOUNTER — PATIENT MESSAGE (OUTPATIENT)
Dept: FAMILY MEDICINE CLINIC | Age: 41
End: 2021-03-24

## 2021-03-24 RX ORDER — LEVOTHYROXINE SODIUM 0.05 MG/1
TABLET ORAL
Qty: 90 TABLET | Refills: 3 | Status: SHIPPED | OUTPATIENT
Start: 2021-03-24 | End: 2022-01-03

## 2021-03-24 NOTE — TELEPHONE ENCOUNTER
Patient's last appointment was : 1/4/2021  Patient's next appointment is : Visit date not found  Last refilled: 05/21/2020

## 2021-03-24 NOTE — TELEPHONE ENCOUNTER
From: Tejinder Venegas  To: Enedelia Bowen DO  Sent: 3/24/2021 10:55 AM EDT  Subject: Prescription Question    Hello I am just asking if I can get a refill on my levothyroxine pill. Pharmacy says no refill.

## 2021-03-31 RX ORDER — DULOXETIN HYDROCHLORIDE 60 MG/1
60 CAPSULE, DELAYED RELEASE ORAL 2 TIMES DAILY
Qty: 60 CAPSULE | Refills: 0 | Status: SHIPPED | OUTPATIENT
Start: 2021-03-31 | End: 2021-05-18 | Stop reason: ALTCHOICE

## 2021-03-31 RX ORDER — PRAZOSIN HYDROCHLORIDE 1 MG/1
3 CAPSULE ORAL NIGHTLY
Qty: 90 CAPSULE | Refills: 0 | OUTPATIENT
Start: 2021-03-31 | End: 2022-05-31

## 2021-03-31 NOTE — TELEPHONE ENCOUNTER
Gabriella Ruiz called into the office stating that she needs a couple medication refills:     Duloxetine 60mg;#60 with 3 refills and Minipress 1mg;#90 with 3 refills; both last sent on 12/08/20. She reports that she has about 3 left on her Minipress and \"some\" left on her Duloxetine. Both medications are pending your approval for a 30 day supply with 0 refills; she was a previous Dr. Sandy Ferraro patient; she is scheduled with this provider on 04/29/21.

## 2021-04-29 ENCOUNTER — VIRTUAL VISIT (OUTPATIENT)
Dept: PSYCHIATRY | Age: 41
End: 2021-04-29
Payer: COMMERCIAL

## 2021-04-29 DIAGNOSIS — F41.1 GAD (GENERALIZED ANXIETY DISORDER): ICD-10-CM

## 2021-04-29 DIAGNOSIS — E66.9 OBESITY WITH BODY MASS INDEX (BMI) OF 30.0 TO 39.9: ICD-10-CM

## 2021-04-29 DIAGNOSIS — F40.01 PANIC DISORDER WITH AGORAPHOBIA: Primary | ICD-10-CM

## 2021-04-29 DIAGNOSIS — F17.210 TOBACCO DEPENDENCE DUE TO CIGARETTES: ICD-10-CM

## 2021-04-29 DIAGNOSIS — F43.23 ADJUSTMENT DISORDER WITH MIXED ANXIETY AND DEPRESSED MOOD: ICD-10-CM

## 2021-04-29 DIAGNOSIS — F43.10 PTSD (POST-TRAUMATIC STRESS DISORDER): ICD-10-CM

## 2021-04-29 PROCEDURE — 4004F PT TOBACCO SCREEN RCVD TLK: CPT | Performed by: REGISTERED NURSE

## 2021-04-29 PROCEDURE — 90792 PSYCH DIAG EVAL W/MED SRVCS: CPT | Performed by: REGISTERED NURSE

## 2021-04-29 RX ORDER — CLONAZEPAM 1 MG/1
1.5 TABLET ORAL 2 TIMES DAILY PRN
Qty: 90 TABLET | Refills: 0 | Status: SHIPPED | OUTPATIENT
Start: 2021-04-29 | End: 2021-08-13 | Stop reason: SDUPTHER

## 2021-04-29 RX ORDER — VENLAFAXINE HYDROCHLORIDE 37.5 MG/1
CAPSULE, EXTENDED RELEASE ORAL
Qty: 56 CAPSULE | Refills: 0 | Status: SHIPPED | OUTPATIENT
Start: 2021-04-29 | End: 2021-06-18 | Stop reason: SDUPTHER
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
## 2021-05-18 ENCOUNTER — OFFICE VISIT (OUTPATIENT)
Dept: ENT CLINIC | Age: 41
End: 2021-05-18
Payer: COMMERCIAL

## 2021-05-18 VITALS
WEIGHT: 191.2 LBS | HEIGHT: 66 IN | DIASTOLIC BLOOD PRESSURE: 64 MMHG | TEMPERATURE: 97.3 F | RESPIRATION RATE: 14 BRPM | HEART RATE: 80 BPM | SYSTOLIC BLOOD PRESSURE: 132 MMHG | BODY MASS INDEX: 30.73 KG/M2

## 2021-05-18 DIAGNOSIS — J30.9 ALLERGIC RHINITIS, UNSPECIFIED SEASONALITY, UNSPECIFIED TRIGGER: ICD-10-CM

## 2021-05-18 DIAGNOSIS — J34.89 RHINORRHEA: Primary | ICD-10-CM

## 2021-05-18 PROCEDURE — 4004F PT TOBACCO SCREEN RCVD TLK: CPT | Performed by: OTOLARYNGOLOGY

## 2021-05-18 PROCEDURE — 99213 OFFICE O/P EST LOW 20 MIN: CPT | Performed by: OTOLARYNGOLOGY

## 2021-05-18 PROCEDURE — G8417 CALC BMI ABV UP PARAM F/U: HCPCS | Performed by: OTOLARYNGOLOGY

## 2021-05-18 PROCEDURE — G8427 DOCREV CUR MEDS BY ELIG CLIN: HCPCS | Performed by: OTOLARYNGOLOGY

## 2021-05-18 NOTE — PROGRESS NOTES
[facility] [place] [facility]  [facility] EAR, NOSE AND THROAT  [facility]  Dept: [phone]  Dept Fax: [phone]  Loc: [phone]    Leyla Carrington is a 42 y.o. female who was referred by No ref. provider found for:  Chief Complaint   Patient presents with    Nasal Congestion     Patient is here for follow up nasal congestion last seen 2/2/21       HPI:     Leyla Carrington is a 42 y.o. female with a history of chronic rhinorrhea and nasal congestion seen originally by me several months ago. I started her on a program of Atrovent nasal and daily antihistamine. The patient reports having taken Atrovent nasal on average once per week feeling it worked when she used it and not otherwise. She also did not take the antihistamine on a regular basis. When asked why she was not taking it on a daily basis, she was not sure of her reasoning but realized in hindsight it might actually helped her and she taken her medications as prescribed. History: Allergies   Allergen Reactions    Bee Pollen      Current Outpatient Medications   Medication Sig Dispense Refill    venlafaxine (EFFEXOR XR) 37.5 MG extended release capsule Take one capsule (37.5mg) each morning for 4 days, then increase to two capsules (75mg) on day five. 56 capsule 0    clonazePAM (KLONOPIN) 1 MG tablet Take 1.5 tablets by mouth 2 times daily as needed for Anxiety for up to 30 days.  90 tablet 0    prazosin (MINIPRESS) 1 MG capsule Take 3 capsules by mouth nightly 90 capsule 0    levothyroxine (SYNTHROID) 50 MCG tablet Take 1 tablet by mouth once daily 90 tablet 3    pseudoephedrine (DECONGESTANT) 30 MG tablet Take 1 tablet by mouth every 6 hours as needed for Congestion 28 tablet 1    FLUoxetine (PROZAC) 20 MG capsule FLUoxetine Prozac 20 mg oral capsule   [facility] 87 ([id number])      ipratropium (ATROVENT) 0.03 % nasal spray 2 sprays by Nasal route 3 times daily 1 Bottle 3    ARIPiprazole (ABILIFY) 10 MG tablet Take 1 tablet by mouth once daily 30 tablet 5    buPROPion (WELLBUTRIN XL) 300 MG extended release tablet Take 1 tablet by mouth every morning 30 tablet 5    albuterol sulfate  (90 Base) MCG/ACT inhaler INHALE 2 PUFFS BY MOUTH EVERY 4 HOURS AS NEEDED FOR WHEEZING AND SHORTNESS OF BREATH 9 g 0    SYMBICORT 160-4.5 MCG/ACT AERO Inhale 2 puffs into the lungs 2 times daily 11 g 5    naproxen (NAPROSYN) 500 MG tablet Take 1 tablet by mouth 2 times daily (with meals) 60 tablet 0    meloxicam (MOBIC) 15 MG tablet Take 1 tablet by mouth daily as needed for Pain 30 tablet 0    ibuprofen (ADVIL;MOTRIN) 200 MG tablet Take 200 mg by mouth every 6 hours as needed for Pain      norethindrone (MICRONOR) 0.35 MG tablet daily      fluticasone (FLONASE) 50 MCG/ACT nasal spray 1 spray by Each Nare route daily      acetaminophen (APAP EXTRA STRENGTH) 500 MG tablet Take 1 tablet by mouth every 6 hours as needed for Pain 120 tablet 3     No current facility-administered medications for this visit.      Past Medical History:   Diagnosis Date    Allergic rhinitis     Anxiety     CAD (coronary artery disease)     Depression (emotion)     DUB (dysfunctional uterine bleeding)     Generalized anxiety disorder     GERD (gastroesophageal reflux disease)     Hypertriglyceridemia 11/11/2014    Hypoglycemia     Hypothyroid 8/10/2014    Movement disorder     Pancreatitis 11/10/14    Psychiatric problem     Scoliosis     Tobacco abuse       Past Surgical History:   Procedure Laterality Date    COLONOSCOPY N/A 7/27/2019    COLONOSCOPY WITH BIOPSY performed by Ludin Carlin MD [facility]      KNEE SURGERY Right 08/2017    KNEE SURGERY Right 02/11/2020     Family History   Problem Relation Age of Onset    Diabetes Mother     High Blood Pressure Mother     Thyroid Disease Mother     Heart Disease Father     Diabetes Father  Cancer Sister         cervical     Social History     Tobacco Use    Smoking status: Current Every Day Smoker     Packs/day: 1.00     Years: 22.00     Pack years: 22.00     Types: Cigarettes     Start date: 1/16/1998    Smokeless tobacco: Never Used   Substance Use Topics    Alcohol use: Yes     Comment: social drinking        Subjective:      Review of Systems  Rest of review of systems are negative, except as noted in HPI. Objective:     /64 (Site: Right Upper Arm, Position: Sitting)   Pulse 80   Temp 97.3 °F (36.3 °C) (Infrared)   Resp 14   Ht 5' 6\" (1.676 m)   Wt 191 lb 3.2 oz (86.7 kg)   BMI 30.86 kg/m²     Physical Exam       The patient is a pleasant alert oriented and cooperative adult female in no acute distress. Her voice and her speech pattern are within normal limits for her age and gender. I heard no throat clearing coughing or inspiratory stridor. She did not appear to have any rhinorrhea at the time of this visit. She is breathing without labor. Her nose is free of crusting or signs of recent bleeding. Vitals reviewed. No results found.    Lab Results   Component Value Date     10/25/2019     08/29/2019     07/27/2019    K 3.7 10/25/2019    K 4.0 08/29/2019    K 3.7 07/27/2019    K 4.1 07/26/2019     10/25/2019     08/29/2019     07/27/2019    CO2 25 10/25/2019    CO2 22 08/29/2019    CO2 22 07/27/2019    BUN 9 10/25/2019    BUN 11 08/29/2019    BUN 8 07/27/2019    CREATININE 0.8 10/25/2019    CREATININE 0.9 08/29/2019    CREATININE 0.6 07/27/2019    CALCIUM 9.5 10/25/2019    CALCIUM 9.8 08/29/2019    CALCIUM 8.1 07/27/2019    PROT 7.0 10/25/2019    PROT 7.4 08/29/2019    PROT 6.5 07/26/2019    LABALBU 4.1 10/25/2019    LABALBU 4.4 08/29/2019    LABALBU 3.9 07/26/2019    LABALBU 4.5 04/05/2012    BILITOT 0.2 10/25/2019    BILITOT 0.3 08/29/2019    BILITOT 0.2 07/26/2019    ALKPHOS 74 10/25/2019    ALKPHOS 84 08/29/2019    ALKPHOS 83 07/26/2019    AST 14 10/25/2019    AST 16 08/29/2019    AST 14 07/26/2019    ALT 11 10/25/2019    ALT 12 08/29/2019    ALT 12 07/26/2019       All of the past medical history, past surgical history, family history,social history, allergies and current medications were reviewed with the patient. Assessment & Plan   Diagnoses and all orders for this visit:     Diagnosis Orders   1. Rhinorrhea     2. Allergic rhinitis, unspecified seasonality, unspecified trigger           Based on her history and his physical findings as well as her lack of response to the minimal treatment regimen she adopted, I recommended that we start over and she use the Atrovent nasal at least twice daily and take her antihistamine once a day. I will see her back in approximately 8 weeks to register her treatment effects. We will plan further care as indicated thereafter. She agreed to proceed as such. Return in about 2 months (around 7/18/2021) for Follow-up care. **This report has been created using voice recognition software. It may contain minor errors which are inherent in voice recognition technology. **

## 2021-05-29 ENCOUNTER — PATIENT MESSAGE (OUTPATIENT)
Dept: FAMILY MEDICINE CLINIC | Age: 41
End: 2021-05-29

## 2021-05-29 DIAGNOSIS — J45.40 MODERATE PERSISTENT REACTIVE AIRWAY DISEASE WITHOUT COMPLICATION: ICD-10-CM

## 2021-06-01 RX ORDER — BUDESONIDE AND FORMOTEROL FUMARATE DIHYDRATE 160; 4.5 UG/1; UG/1
2 AEROSOL RESPIRATORY (INHALATION) 2 TIMES DAILY
Qty: 11 G | Refills: 5 | Status: SHIPPED | OUTPATIENT
Start: 2021-06-01 | End: 2021-06-03 | Stop reason: SDUPTHER

## 2021-06-01 NOTE — TELEPHONE ENCOUNTER
From: Aisha Kc  To: Den Melissa DO  Sent: 5/29/2021 8:35 AM EDT  Subject: Prescription Question    I was wondering if you could refill my symbicort please

## 2021-06-03 ENCOUNTER — PATIENT MESSAGE (OUTPATIENT)
Dept: FAMILY MEDICINE CLINIC | Age: 41
End: 2021-06-03

## 2021-06-03 DIAGNOSIS — J45.40 MODERATE PERSISTENT REACTIVE AIRWAY DISEASE WITHOUT COMPLICATION: ICD-10-CM

## 2021-06-03 RX ORDER — BUDESONIDE AND FORMOTEROL FUMARATE DIHYDRATE 160; 4.5 UG/1; UG/1
2 AEROSOL RESPIRATORY (INHALATION) 2 TIMES DAILY
Qty: 11 G | Refills: 5 | Status: SHIPPED | OUTPATIENT
Start: 2021-06-03

## 2021-06-03 NOTE — TELEPHONE ENCOUNTER
From: Charl Holter  To: Randee Alvarez DO  Sent: 6/3/2021 12:22 PM EDT  Subject: Prescription Question    I was wondering if you can put refills on my symbicort I'm alf done with the current one I have.

## 2021-06-17 ENCOUNTER — TELEPHONE (OUTPATIENT)
Dept: FAMILY MEDICINE CLINIC | Age: 41
End: 2021-06-17

## 2021-06-17 DIAGNOSIS — F43.10 PTSD (POST-TRAUMATIC STRESS DISORDER): ICD-10-CM

## 2021-06-17 DIAGNOSIS — F41.1 GAD (GENERALIZED ANXIETY DISORDER): ICD-10-CM

## 2021-06-17 DIAGNOSIS — F40.01 PANIC DISORDER WITH AGORAPHOBIA: ICD-10-CM

## 2021-06-17 NOTE — TELEPHONE ENCOUNTER
Pt called in and stated that she is a pt of [facility] and she missed an appt and they are not filling her medications that she is in need of, she is wanting to know if PCP could take care of them EFFEXOR 37.5 MG KLONOPIN 1MG AND ABILIFY 10MG  Trena Gambles is this not able to be done is wanting to know what her options are please follow up

## 2021-06-18 RX ORDER — ARIPIPRAZOLE 10 MG/1
TABLET ORAL
Qty: 30 TABLET | Refills: 5 | Status: SHIPPED | OUTPATIENT
Start: 2021-06-18 | End: 2022-07-13 | Stop reason: ALTCHOICE

## 2021-06-18 RX ORDER — VENLAFAXINE HYDROCHLORIDE 37.5 MG/1
CAPSULE, EXTENDED RELEASE ORAL
Qty: 56 CAPSULE | Refills: 0 | Status: SHIPPED | OUTPATIENT
Start: 2021-06-18 | End: 2022-07-13 | Stop reason: ALTCHOICE

## 2021-06-18 NOTE — TELEPHONE ENCOUNTER
We don't do the klonopin but we can do the effexor and abilify - sent them in! Can we do a referral to jennifer for her? Or jaden johnson for counseling?

## 2021-07-30 ENCOUNTER — HOSPITAL ENCOUNTER (EMERGENCY)
Age: 41
Discharge: HOME | End: 2021-07-30
Payer: COMMERCIAL

## 2021-07-30 VITALS
DIASTOLIC BLOOD PRESSURE: 74 MMHG | HEIGHT: 67 IN | SYSTOLIC BLOOD PRESSURE: 119 MMHG | RESPIRATION RATE: 16 BRPM | WEIGHT: 180 LBS | HEART RATE: 64 BPM | OXYGEN SATURATION: 99 % | BODY MASS INDEX: 28.25 KG/M2 | TEMPERATURE: 97.9 F

## 2021-07-30 DIAGNOSIS — Z77.098 CHEMICAL EXPOSURE OF EYE: Primary | ICD-10-CM

## 2021-07-30 PROCEDURE — 99213 OFFICE O/P EST LOW 20 MIN: CPT

## 2021-07-30 PROCEDURE — 99213 OFFICE O/P EST LOW 20 MIN: CPT | Performed by: EMERGENCY MEDICINE

## 2021-07-30 PROCEDURE — 2500000003 HC RX 250 WO HCPCS: Performed by: EMERGENCY MEDICINE

## 2021-07-30 RX ORDER — PROPARACAINE HYDROCHLORIDE 5 MG/ML
2 SOLUTION/ DROPS OPHTHALMIC ONCE
Status: COMPLETED | OUTPATIENT
Start: 2021-07-30 | End: 2021-07-30

## 2021-07-30 RX ADMIN — PROPARACAINE HYDROCHLORIDE 2 DROP: 5 SOLUTION/ DROPS OPHTHALMIC at 11:41

## 2021-07-30 ASSESSMENT — VISUAL ACUITY
OD: 20/40
OU: 20/40
OS: 20/30
OU: 1

## 2021-07-30 ASSESSMENT — ENCOUNTER SYMPTOMS
COUGH: 0
EYE PAIN: 1
EYE DISCHARGE: 0
COLOR CHANGE: 0
EYE REDNESS: 0
SHORTNESS OF BREATH: 0
EYE ITCHING: 0

## 2021-07-30 NOTE — ED NOTES
Flushed OU with saline solution approx 500 ml. Pt c/o tickle with flush OS and OD itching.   Pt request stop procedure stating\" Feeling better now\"     Rena Alexandre RN  07/30/21 1216

## 2021-07-30 NOTE — ED PROVIDER NOTES
[facility]  Urgent Care Encounter       CHIEF COMPLAINT       Chief Complaint   Patient presents with    Eye Problem     Bilateral        Nurses Notes reviewed and I agree except as noted in the HPI. HISTORY OF PRESENT ILLNESS   Michelle Ventura is a 42 y.o. female who presents for complaints of eye irritation. The patient states that she was applying a flea collar onto her dog. She states she rubbed both of her eyes after applying a flea collar and this irritated her eyes. She is complaining of a burning sensation to the eyes and her eyelids are red and inflamed. This began approximately 2 hours prior to arrival.  The patient has been applying cool washcloths to the area and applied lubricating eyedrops with no improvement of her symptoms. HPI    REVIEW OF SYSTEMS     Review of Systems   Constitutional: Negative for fatigue and fever. Eyes: Positive for pain. Negative for discharge, redness, itching and visual disturbance. Respiratory: Negative for cough and shortness of breath. Skin: Negative for color change. Neurological: Negative for dizziness and headaches. Psychiatric/Behavioral: Negative for behavioral problems. PAST MEDICAL HISTORY         Diagnosis Date    Allergic rhinitis     Anxiety     CAD (coronary artery disease)     Depression (emotion)     DUB (dysfunctional uterine bleeding)     Generalized anxiety disorder     GERD (gastroesophageal reflux disease)     Hypertriglyceridemia 11/11/2014    Hypoglycemia     Hypothyroid 8/10/2014    Movement disorder     Pancreatitis 11/10/14    Psychiatric problem     Scoliosis     Tobacco abuse        SURGICALHISTORY     Patient  has a past surgical history that includes fracture surgery; knee surgery (Right, 08/2017); Colonoscopy (N/A, 7/27/2019); and knee surgery (Right, 02/11/2020).     CURRENT MEDICATIONS       Previous Medications    ACETAMINOPHEN (APAP EXTRA STRENGTH) 500 MG TABLET    Take 1 tablet by mouth every 6 hours as needed for Pain    ARIPIPRAZOLE (ABILIFY) 10 MG TABLET    Take 1 tablet by mouth once daily    BUPROPION (WELLBUTRIN XL) 300 MG EXTENDED RELEASE TABLET    Take 1 tablet by mouth every morning    CLONAZEPAM (KLONOPIN) 1 MG TABLET    Take 1.5 tablets by mouth 2 times daily as needed for Anxiety for up to 30 days. FLUTICASONE (FLONASE) 50 MCG/ACT NASAL SPRAY    1 spray by Each Nare route daily    IBUPROFEN (ADVIL;MOTRIN) 200 MG TABLET    Take 200 mg by mouth every 6 hours as needed for Pain    LEVOTHYROXINE (SYNTHROID) 50 MCG TABLET    Take 1 tablet by mouth once daily    PRAZOSIN (MINIPRESS) 1 MG CAPSULE    Take 3 capsules by mouth nightly    SYMBICORT 160-4.5 MCG/ACT AERO    Inhale 2 puffs into the lungs 2 times daily    VENLAFAXINE (EFFEXOR XR) 37.5 MG EXTENDED RELEASE CAPSULE    Take one capsule (37.5mg) each morning for 4 days, then increase to two capsules (75mg) on day five. ALLERGIES     Patient is is allergic to bee pollen. Patients   Immunization History   Administered Date(s) Administered    Influenza Virus Vaccine 10/04/2019    Tdap (Boostrix, Adacel) 01/14/2018       FAMILY HISTORY     Patient's family history includes Cancer in her sister; Diabetes in her father and mother; Heart Disease in her father; High Blood Pressure in her mother; Thyroid Disease in her mother. SOCIAL HISTORY     Patient  reports that she has been smoking cigarettes. She started smoking about 23 years ago. She has a 22.00 pack-year smoking history. She has never used smokeless tobacco. She reports current alcohol use. She reports that she does not use drugs. PHYSICAL EXAM     ED TRIAGE VITALS  BP: 119/74, Temp: 97.9 °F (36.6 °C), Pulse: 64, Resp: 16, SpO2: 99 %,Estimated body mass index is 28.19 kg/m² as calculated from the following:    Height as of this encounter: 5' 7\" (1.702 m). Weight as of this encounter: 180 lb (81.6 kg). ,Patient's last menstrual period was 07/25/2021. Physical Exam  Constitutional:       General: She is not in acute distress. Appearance: She is normal weight. She is not ill-appearing. HENT:      Head: Normocephalic and atraumatic. Nose: Nose normal.      Mouth/Throat:      Mouth: Mucous membranes are moist.   Eyes:      General: Vision grossly intact. Gaze aligned appropriately. Extraocular Movements: Extraocular movements intact. Conjunctiva/sclera: Conjunctivae normal.      Right eye: Right conjunctiva is not injected. No chemosis or exudate. Left eye: Left conjunctiva is not injected. No chemosis or exudate. Pupils: Pupils are equal, round, and reactive to light. Comments: Bilateral upper eyelids are erythematous and mildly inflamed, tender to touch   Cardiovascular:      Rate and Rhythm: Normal rate. Pulmonary:      Effort: Pulmonary effort is normal.   Skin:     General: Skin is warm and dry. Capillary Refill: Capillary refill takes less than 2 seconds. Neurological:      General: No focal deficit present. Mental Status: She is alert. Psychiatric:         Mood and Affect: Mood normal.         Behavior: Behavior normal.         DIAGNOSTIC RESULTS     Labs:No results found for this visit on 07/30/21. IMAGING:    No orders to display         EKG:      URGENT CARE COURSE:     Vitals:    07/30/21 1113   BP: 119/74   Pulse: 64   Resp: 16   Temp: 97.9 °F (36.6 °C)   TempSrc: Temporal   SpO2: 99%   Weight: 180 lb (81.6 kg)   Height: 5' 7\" (1.702 m)       Medications   proparacaine (ALCAINE) 0.5 % ophthalmic solution 2 drop (2 drops Both Eyes Given 7/30/21 1141)            PROCEDURES:  None    FINAL IMPRESSION      1. Chemical exposure of eye          DISPOSITION/ PLAN     Patient presents for chemical irritation to bilateral eyes. Patient's vision is intact. There is no chemosis or injection of the eyes. Alcaine drops were applied to bilateral eyes with relief of her discomfort.   Eyes were irrigated using sterile normal saline with improvement of the patient's symptoms. Patient states she was feeling much better after eye irrigation. Visual acuity was checked after eye irrigation and is satisfactory. Patient will be discharged. Advised to apply cool compresses to the area for any further irritation. She may use her lubricating eyedrops as needed. Advised to return for increased eye swelling, irritation, loss of vision, drainage from the eyes or new concerns. Patient verbalized understanding of all instructions.       PATIENT REFERRED TO:  Daly Barfield DO  [address] [place] [address] / [facility] [address]      DISCHARGE MEDICATIONS:  New Prescriptions    No medications on file       Discontinued Medications    ALBUTEROL SULFATE  (90 BASE) MCG/ACT INHALER    INHALE 2 PUFFS BY MOUTH EVERY 4 HOURS AS NEEDED FOR WHEEZING AND SHORTNESS OF BREATH    FLUOXETINE (PROZAC) 20 MG CAPSULE    FLUoxetine Prozac 20 mg oral capsule   [facility] 87 ([id number])    IPRATROPIUM (ATROVENT) 0.03 % NASAL SPRAY    2 sprays by Nasal route 3 times daily    MELOXICAM (MOBIC) 15 MG TABLET    Take 1 tablet by mouth daily as needed for Pain    NAPROXEN (NAPROSYN) 500 MG TABLET    Take 1 tablet by mouth 2 times daily (with meals)    NORETHINDRONE (MICRONOR) 0.35 MG TABLET    daily    PSEUDOEPHEDRINE (DECONGESTANT) 30 MG TABLET    Take 1 tablet by mouth every 6 hours as needed for Congestion       Current Discharge Medication List          SHANA Yoder CNP    (Please note that portions of this note were completed with a voice recognition program. Efforts were made to edit the dictations but occasionally words are mis-transcribed.)          SHANA Yoder CNP  07/30/21 1200

## 2021-07-30 NOTE — ED TRIAGE NOTES
Pt to [facility] ambulatory with bilateral eye pain. This started this AM around 1000. Pt touched a Flea collar and then touched her eyes.

## 2021-08-12 DIAGNOSIS — F40.01 PANIC DISORDER WITH AGORAPHOBIA: ICD-10-CM

## 2021-08-12 NOTE — TELEPHONE ENCOUNTER
Pt states she is currently looking for a new psych doctor d/t Dr Kamlesh Silverio has left town. Pt is asking if Dr Rafael Paul would refill her Klonopin until she can get a new psych doctor. [address]. Please advise/send.

## 2021-08-12 NOTE — TELEPHONE ENCOUNTER
----- Message from Stan Market sent at 8/12/2021 12:21 PM EDT -----  Subject: Message to Provider    QUESTIONS  Information for Provider? Patient states that she has a few questions   about trying to get her prescription refilled due to her physiatrists   ---------------------------------------------------------------------------  --------------  [address]  What is the best way for the office to contact you? OK to leave message on   voicemail  Preferred Call Back Phone Number? [phone]  ---------------------------------------------------------------------------  --------------  SCRIPT ANSWERS  Relationship to Patient?  Self

## 2021-08-13 RX ORDER — CLONAZEPAM 1 MG/1
1.5 TABLET ORAL 2 TIMES DAILY PRN
Qty: 90 TABLET | Refills: 0 | Status: SHIPPED | OUTPATIENT
Start: 2021-08-13 | End: 2022-01-24

## 2021-11-20 ENCOUNTER — HOSPITAL ENCOUNTER (EMERGENCY)
Age: 41
Discharge: HOME | End: 2021-11-20
Payer: COMMERCIAL

## 2021-11-20 VITALS
TEMPERATURE: 98.1 F | RESPIRATION RATE: 14 BRPM | DIASTOLIC BLOOD PRESSURE: 93 MMHG | BODY MASS INDEX: 29.82 KG/M2 | SYSTOLIC BLOOD PRESSURE: 152 MMHG | HEIGHT: 67 IN | HEART RATE: 65 BPM | WEIGHT: 190 LBS | OXYGEN SATURATION: 99 %

## 2021-11-20 DIAGNOSIS — B97.89 VIRAL SINUSITIS: Primary | ICD-10-CM

## 2021-11-20 DIAGNOSIS — J32.9 VIRAL SINUSITIS: Primary | ICD-10-CM

## 2021-11-20 PROCEDURE — 99213 OFFICE O/P EST LOW 20 MIN: CPT

## 2021-11-20 PROCEDURE — 99213 OFFICE O/P EST LOW 20 MIN: CPT | Performed by: NURSE PRACTITIONER

## 2021-11-20 RX ORDER — DEXTROMETHORPHAN HYDROBROMIDE AND PROMETHAZINE HYDROCHLORIDE 15; 6.25 MG/5ML; MG/5ML
5 SYRUP ORAL 4 TIMES DAILY PRN
Qty: 150 ML | Refills: 0 | Status: SHIPPED | OUTPATIENT
Start: 2021-11-20 | End: 2021-11-27

## 2021-11-20 RX ORDER — PREDNISONE 20 MG/1
20 TABLET ORAL 2 TIMES DAILY
Qty: 10 TABLET | Refills: 0 | Status: SHIPPED | OUTPATIENT
Start: 2021-11-20 | End: 2021-11-25

## 2021-11-20 ASSESSMENT — ENCOUNTER SYMPTOMS
GASTROINTESTINAL NEGATIVE: 1
SINUS PRESSURE: 1
CHEST TIGHTNESS: 0
EYE REDNESS: 0
EYE ITCHING: 0
WHEEZING: 0
SHORTNESS OF BREATH: 0
SORE THROAT: 1
COUGH: 1
HOARSE VOICE: 1
EYE PAIN: 0
SINUS PAIN: 1

## 2021-11-20 ASSESSMENT — PAIN SCALES - GENERAL: PAINLEVEL_OUTOF10: 7

## 2021-11-20 ASSESSMENT — PAIN DESCRIPTION - ORIENTATION: ORIENTATION: LEFT

## 2021-11-20 ASSESSMENT — PAIN DESCRIPTION - FREQUENCY: FREQUENCY: INTERMITTENT

## 2021-11-20 ASSESSMENT — PAIN DESCRIPTION - DESCRIPTORS: DESCRIPTORS: ACHING

## 2021-11-20 ASSESSMENT — PAIN DESCRIPTION - LOCATION: LOCATION: NECK

## 2021-11-20 ASSESSMENT — PAIN DESCRIPTION - PAIN TYPE: TYPE: ACUTE PAIN

## 2021-11-20 NOTE — ED PROVIDER NOTES
[address]       Chief Complaint   Patient presents with    Sinusitis     lump on right side of neck x 6 months    Cough       Nurses Notes reviewed and I agree except as noted in the HPI. HISTORY OF PRESENT ILLNESS   Sheree Cowden is a 42 y.o. female who presents Pt also has a small lump at base of hairline behind right ear that has been present x 6 months, and not changing. The history is provided by the patient. Sinusitis  Pain details:     Location:  Frontal    Quality:  Aching, burning and dull    Severity:  Moderate    Duration:  1 week    Timing:  Intermittent  Progression:  Worsening  Chronicity:  New  Context: allergies and recent URI    Relieved by:  None tried  Worsened by:  Lying down, position changes and sitting upright  Ineffective treatments:  Saline sprays  Associated symptoms: congestion, cough, fatigue, headaches, hoarse voice, mouth breathing, sore throat and tooth pain    Associated symptoms: no chest pain, no shortness of breath and no wheezing    Congestion:     Location:  Nasal    Interferes with sleep: yes      Interferes with eating/drinking: yes    Risk factors: no allergic reaction, no BiPAP, no nasal polyps and no smoke exposure          REVIEW OF SYSTEMS     Review of Systems   Constitutional: Positive for activity change, appetite change and fatigue. HENT: Positive for congestion, hoarse voice, sinus pressure, sinus pain and sore throat. Eyes: Negative for pain, redness and itching. Respiratory: Positive for cough. Negative for chest tightness, shortness of breath and wheezing. Cardiovascular: Negative for chest pain and leg swelling. Gastrointestinal: Negative. Endocrine: Negative. Genitourinary: Negative. Musculoskeletal: Negative. Skin: Negative. Allergic/Immunologic: Positive for environmental allergies. Neurological: Positive for headaches.    Hematological: Negative for adenopathy. Does not bruise/bleed easily. Psychiatric/Behavioral: Negative. PAST MEDICAL HISTORY         Diagnosis Date    Allergic rhinitis     Anxiety     CAD (coronary artery disease)     Depression (emotion)     DUB (dysfunctional uterine bleeding)     Generalized anxiety disorder     GERD (gastroesophageal reflux disease)     Hypertriglyceridemia 11/11/2014    Hypoglycemia     Hypothyroid 8/10/2014    Movement disorder     Pancreatitis 11/10/14    Psychiatric problem     Scoliosis     Tobacco abuse        SURGICAL HISTORY     Patient  has a past surgical history that includes fracture surgery; knee surgery (Right, 08/2017); Colonoscopy (N/A, 7/27/2019); and knee surgery (Right, 02/11/2020). CURRENT MEDICATIONS       Discharge Medication List as of 11/20/2021  3:51 PM      CONTINUE these medications which have NOT CHANGED    Details   venlafaxine (EFFEXOR XR) 37.5 MG extended release capsule Take one capsule (37.5mg) each morning for 4 days, then increase to two capsules (75mg) on day five., Disp-56 capsule, R-0Normal      ARIPiprazole (ABILIFY) 10 MG tablet Take 1 tablet by mouth once daily, Disp-30 tablet, R-5Normal      SYMBICORT 160-4.5 MCG/ACT AERO Inhale 2 puffs into the lungs 2 times daily, Disp-11 g, R-5, DAWNormal      prazosin (MINIPRESS) 1 MG capsule Take 3 capsules by mouth nightly, Disp-90 capsule, R-0Normal      levothyroxine (SYNTHROID) 50 MCG tablet Take 1 tablet by mouth once daily, Disp-90 tablet, R-3Normal      buPROPion (WELLBUTRIN XL) 300 MG extended release tablet Take 1 tablet by mouth every morning, Disp-30 tablet, R-5Normal      clonazePAM (KLONOPIN) 1 MG tablet Take 1.5 tablets by mouth 2 times daily as needed for Anxiety for up to 30 days. , Disp-90 tablet, R-0Normal      ibuprofen (ADVIL;MOTRIN) 200 MG tablet Take 200 mg by mouth every 6 hours as needed for PainHistorical Med      fluticasone (FLONASE) 50 MCG/ACT nasal spray 1 spray by Each Nare route dailyHistorical Med      acetaminophen (APAP EXTRA STRENGTH) 500 MG tablet Take 1 tablet by mouth every 6 hours as needed for Pain, Disp-120 tablet, R-3Print             ALLERGIES     Patient is is allergic to bee pollen. FAMILY HISTORY     Patient'sfamily history includes Cancer in her sister; Diabetes in her father and mother; Heart Disease in her father; High Blood Pressure in her mother; Thyroid Disease in her mother. SOCIAL HISTORY     Patient  reports that she has been smoking cigarettes. She started smoking about 23 years ago. She has a 22.00 pack-year smoking history. She has never used smokeless tobacco. She reports previous alcohol use. She reports that she does not use drugs. PHYSICAL EXAM     ED TRIAGE VITALS  BP: (!) 152/93, Temp: 98.1 °F (36.7 °C), Pulse: 65, Resp: 14, SpO2: 99 %  Physical Exam  Vitals and nursing note reviewed. Constitutional:       Appearance: Normal appearance. She is well-developed and normal weight. She is not ill-appearing. HENT:      Head: Normocephalic and atraumatic. Right Ear: External ear normal.      Left Ear: External ear normal.      Nose: Mucosal edema, congestion and rhinorrhea ( yellow) present. Rhinorrhea is clear. Right Sinus: Frontal sinus tenderness present. Left Sinus: Frontal sinus tenderness present. Mouth/Throat:      Mouth: Mucous membranes are moist.      Pharynx: No oropharyngeal exudate or posterior oropharyngeal erythema. Eyes:      General: Scleral icterus present. Right eye: No discharge. Left eye: No discharge. Conjunctiva/sclera: Conjunctivae normal.   Neck:      Thyroid: No thyromegaly. Cardiovascular:      Rate and Rhythm: Normal rate and regular rhythm. Pulses: Normal pulses. Heart sounds: Normal heart sounds. Pulmonary:      Effort: Pulmonary effort is normal. No respiratory distress. Breath sounds: Normal breath sounds. No wheezing or rales.    Chest:      Chest wall: No tenderness. Abdominal:      General: Bowel sounds are normal. There is no distension. Palpations: Abdomen is soft. Tenderness: There is no abdominal tenderness. Musculoskeletal:         General: No tenderness. Normal range of motion. Cervical back: Normal range of motion and neck supple. No tenderness. No muscular tenderness. Lymphadenopathy:      Cervical: No cervical adenopathy. Skin:     General: Skin is warm and dry. Capillary Refill: Capillary refill takes less than 2 seconds. Coloration: Skin is not pale. Findings: No erythema or rash. Neurological:      General: No focal deficit present. Mental Status: She is alert and oriented to person, place, and time. Mental status is at baseline. Cranial Nerves: No cranial nerve deficit. Motor: No abnormal muscle tone. Coordination: Coordination normal.      Deep Tendon Reflexes: Reflexes are normal and symmetric. Reflexes normal.   Psychiatric:         Behavior: Behavior normal.         Thought Content: Thought content normal.         Judgment: Judgment normal.         DIAGNOSTIC RESULTS   Labs: No results found for this visit on 11/20/21. IMAGING:  No orders to display     URGENT CARE COURSE:     Vitals:    11/20/21 1538   BP: (!) 152/93   Pulse: 65   Resp: 14   Temp: 98.1 °F (36.7 °C)   TempSrc: Temporal   SpO2: 99%   Weight: 190 lb (86.2 kg)   Height: 5' 7\" (1.702 m)       Medications - No data to display  PROCEDURES:  None  FINALIMPRESSION    I have reviewed the patient's medical history in detail and updated the computerized patient record. HPI/ROS per the patient and caregiver. Overall non toxic in appearance. Answers questions appropriately. Conditions discussed and addressed this visit include:     Patient appears ill but non-toxic and well hydrated. Symptoms consistent wit mild illness and viral sinusitis. Patient is to take medications as directed. Continue all home medications.  Potential

## 2021-11-20 NOTE — ED NOTES
Assessment unchanged. Discharge instructions reviewed with patient. Patient informed to go to ER for worsening symptoms, SOB, chest pain or abdominal pain. Patient ambulatory out in stable condition.       Yolie Martínez RN  11/20/21 7829

## 2021-11-20 NOTE — Clinical Note
Ori Roy was seen and treated in our emergency department on 11/20/2021. She may return to work on 11/21/2021. If you have any questions or concerns, please don't hesitate to call.       Pete Appiah, SHANA - CNP

## 2021-11-23 ENCOUNTER — NURSE ONLY (OUTPATIENT)
Dept: LAB | Age: 41
End: 2021-11-23

## 2021-11-23 ENCOUNTER — OFFICE VISIT (OUTPATIENT)
Dept: FAMILY MEDICINE CLINIC | Age: 41
End: 2021-11-23
Payer: COMMERCIAL

## 2021-11-23 VITALS
RESPIRATION RATE: 18 BRPM | SYSTOLIC BLOOD PRESSURE: 122 MMHG | BODY MASS INDEX: 30.13 KG/M2 | HEART RATE: 76 BPM | TEMPERATURE: 96.4 F | DIASTOLIC BLOOD PRESSURE: 76 MMHG | WEIGHT: 192 LBS | OXYGEN SATURATION: 99 % | HEIGHT: 67 IN

## 2021-11-23 DIAGNOSIS — R59.9 SWOLLEN LYMPH NODES: ICD-10-CM

## 2021-11-23 DIAGNOSIS — E03.9 HYPOTHYROIDISM, UNSPECIFIED TYPE: ICD-10-CM

## 2021-11-23 DIAGNOSIS — R07.9 CHEST PAIN, UNSPECIFIED TYPE: ICD-10-CM

## 2021-11-23 DIAGNOSIS — L24.5 IRRITANT CONTACT DERMATITIS DUE TO OTHER CHEMICAL PRODUCTS: Primary | ICD-10-CM

## 2021-11-23 LAB
ALBUMIN SERPL-MCNC: 4.3 G/DL (ref 3.5–5.1)
ALP BLD-CCNC: 81 U/L (ref 38–126)
ALT SERPL-CCNC: 9 U/L (ref 11–66)
ANION GAP SERPL CALCULATED.3IONS-SCNC: 13 MEQ/L (ref 8–16)
AST SERPL-CCNC: 13 U/L (ref 5–40)
BASOPHILS # BLD: 0.7 %
BASOPHILS ABSOLUTE: 0 THOU/MM3 (ref 0–0.1)
BILIRUB SERPL-MCNC: 0.3 MG/DL (ref 0.3–1.2)
BUN BLDV-MCNC: 10 MG/DL (ref 7–22)
CALCIUM SERPL-MCNC: 9.6 MG/DL (ref 8.5–10.5)
CHLORIDE BLD-SCNC: 102 MEQ/L (ref 98–111)
CHOLESTEROL, TOTAL: 196 MG/DL (ref 100–199)
CO2: 25 MEQ/L (ref 23–33)
CREAT SERPL-MCNC: 0.9 MG/DL (ref 0.4–1.2)
EOSINOPHIL # BLD: 1 %
EOSINOPHILS ABSOLUTE: 0.1 THOU/MM3 (ref 0–0.4)
ERYTHROCYTE [DISTWIDTH] IN BLOOD BY AUTOMATED COUNT: 12.8 % (ref 11.5–14.5)
ERYTHROCYTE [DISTWIDTH] IN BLOOD BY AUTOMATED COUNT: 43.9 FL (ref 35–45)
GFR SERPL CREATININE-BSD FRML MDRD: 68 ML/MIN/1.73M2
GLUCOSE BLD-MCNC: 84 MG/DL (ref 70–108)
HCT VFR BLD CALC: 47.4 % (ref 37–47)
HDLC SERPL-MCNC: 50 MG/DL
HEMOGLOBIN: 15.3 GM/DL (ref 12–16)
IMMATURE GRANS (ABS): 0.02 THOU/MM3 (ref 0–0.07)
IMMATURE GRANULOCYTES: 0.3 %
LDL CHOLESTEROL CALCULATED: 120 MG/DL
LYMPHOCYTES # BLD: 35 %
LYMPHOCYTES ABSOLUTE: 2.4 THOU/MM3 (ref 1–4.8)
MCH RBC QN AUTO: 30.3 PG (ref 26–33)
MCHC RBC AUTO-ENTMCNC: 32.3 GM/DL (ref 32.2–35.5)
MCV RBC AUTO: 93.9 FL (ref 81–99)
MONOCYTES # BLD: 7.4 %
MONOCYTES ABSOLUTE: 0.5 THOU/MM3 (ref 0.4–1.3)
NUCLEATED RED BLOOD CELLS: 0 /100 WBC
PLATELET # BLD: 267 THOU/MM3 (ref 130–400)
PMV BLD AUTO: 10.9 FL (ref 9.4–12.4)
POTASSIUM SERPL-SCNC: 3.7 MEQ/L (ref 3.5–5.2)
RBC # BLD: 5.05 MILL/MM3 (ref 4.2–5.4)
SEG NEUTROPHILS: 55.6 %
SEGMENTED NEUTROPHILS ABSOLUTE COUNT: 3.8 THOU/MM3 (ref 1.8–7.7)
SODIUM BLD-SCNC: 140 MEQ/L (ref 135–145)
T4 FREE: 1.19 NG/DL (ref 0.93–1.76)
TOTAL PROTEIN: 7.2 G/DL (ref 6.1–8)
TRIGL SERPL-MCNC: 129 MG/DL (ref 0–199)
TSH SERPL DL<=0.05 MIU/L-ACNC: 4.85 UIU/ML (ref 0.4–4.2)
WBC # BLD: 6.8 THOU/MM3 (ref 4.8–10.8)

## 2021-11-23 PROCEDURE — 4004F PT TOBACCO SCREEN RCVD TLK: CPT | Performed by: STUDENT IN AN ORGANIZED HEALTH CARE EDUCATION/TRAINING PROGRAM

## 2021-11-23 PROCEDURE — 99214 OFFICE O/P EST MOD 30 MIN: CPT | Performed by: STUDENT IN AN ORGANIZED HEALTH CARE EDUCATION/TRAINING PROGRAM

## 2021-11-23 PROCEDURE — G8427 DOCREV CUR MEDS BY ELIG CLIN: HCPCS | Performed by: STUDENT IN AN ORGANIZED HEALTH CARE EDUCATION/TRAINING PROGRAM

## 2021-11-23 PROCEDURE — G8417 CALC BMI ABV UP PARAM F/U: HCPCS | Performed by: STUDENT IN AN ORGANIZED HEALTH CARE EDUCATION/TRAINING PROGRAM

## 2021-11-23 PROCEDURE — G8484 FLU IMMUNIZE NO ADMIN: HCPCS | Performed by: STUDENT IN AN ORGANIZED HEALTH CARE EDUCATION/TRAINING PROGRAM

## 2021-11-23 SDOH — ECONOMIC STABILITY: FOOD INSECURITY: WITHIN THE PAST 12 MONTHS, THE FOOD YOU BOUGHT JUST DIDN'T LAST AND YOU DIDN'T HAVE MONEY TO GET MORE.: NEVER TRUE

## 2021-11-23 SDOH — ECONOMIC STABILITY: FOOD INSECURITY: WITHIN THE PAST 12 MONTHS, YOU WORRIED THAT YOUR FOOD WOULD RUN OUT BEFORE YOU GOT MONEY TO BUY MORE.: NEVER TRUE

## 2021-11-23 ASSESSMENT — ENCOUNTER SYMPTOMS
EYE PAIN: 0
TROUBLE SWALLOWING: 0
CONSTIPATION: 0
DIARRHEA: 0
SHORTNESS OF BREATH: 0
VOMITING: 0
COUGH: 0
NAUSEA: 0
BLOOD IN STOOL: 0
ABDOMINAL PAIN: 0

## 2021-11-23 ASSESSMENT — SOCIAL DETERMINANTS OF HEALTH (SDOH): HOW HARD IS IT FOR YOU TO PAY FOR THE VERY BASICS LIKE FOOD, HOUSING, MEDICAL CARE, AND HEATING?: NOT HARD AT ALL

## 2021-11-23 NOTE — PROGRESS NOTES
[id number] [facility] [place] [id number]  Dept: [phone]  Dept Fax: [phone]  Loc: [phone]      Dede Franz is a 42 y.o. female who presents today for:  Chief Complaint   Patient presents with    Other     lump on neck    Rash     On back; thinks its from her tens unit she wears at work        Goals      Exercise 3x per week (30 min per time)           HPI:     HPI  Patient is a 19-year-old female who presents for multiple issues today. Patient complaining of red itchy rash along the right shoulder and right back. Patient states that this is been on and off for the last several weeks. States that it is very itchy and has itched to open sores multiple times. She states that the rash is located along the same area where she has been placing TENS unit pads for her scoliosis pain. Patient has tried hydrocortisone cream for the rash with severe burning. Patient also complaining of swollen lymph in the right neck. This it is been present for the last 2 weeks. She states that she was diagnosed with a viral sinusitis and has been ill over the last several weeks around the time when the lymph node first began. She states that it is very tender to the touch and has not changed in size over the last 2 weeks. Complaining of chest pain on and off she states that this happens about twice a week. And the most of time is associated with exercise however she will have symptoms at rest on occasion. She usually last for about 5 minutes and then resolves on its own. She states that the pain is located in her mid chest and does not radiate. She states that she had previously seen a cardiologist for this in the past and was ordered to get an echocardiogram and a stress test however these were never completed. No recent EKG.     Past Medical History:   Diagnosis Date    Allergic rhinitis     Anxiety     CAD (coronary artery disease)     Depression (emotion)     DUB (dysfunctional uterine bleeding)     Generalized anxiety disorder     GERD (gastroesophageal reflux disease)     Hypertriglyceridemia 11/11/2014    Hypoglycemia     Hypothyroid 8/10/2014    Movement disorder     Pancreatitis 11/10/14    Psychiatric problem     Scoliosis     Tobacco abuse       Past Surgical History:   Procedure Laterality Date    COLONOSCOPY N/A 7/27/2019    COLONOSCOPY WITH BIOPSY performed by Biju Mcmahon MD at [address] [place]      KNEE SURGERY Right 08/2017    KNEE SURGERY Right 02/11/2020     Family History   Problem Relation Age of Onset    Diabetes Mother     High Blood Pressure Mother     Thyroid Disease Mother     Heart Disease Father     Diabetes Father     Cancer Sister         cervical     Social History     Tobacco Use    Smoking status: Current Every Day Smoker     Packs/day: 1.00     Years: 22.00     Pack years: 22.00     Types: Cigarettes     Start date: 1/16/1998    Smokeless tobacco: Never Used   Substance Use Topics    Alcohol use: Not Currently     Comment: social drinking      Current Outpatient Medications   Medication Sig Dispense Refill    predniSONE (DELTASONE) 20 MG tablet Take 1 tablet by mouth 2 times daily for 5 days 10 tablet 0    promethazine-dextromethorphan (PROMETHAZINE-DM) 6.25-15 MG/5ML syrup Take 5 mLs by mouth 4 times daily as needed for Cough 150 mL 0    venlafaxine (EFFEXOR XR) 37.5 MG extended release capsule Take one capsule (37.5mg) each morning for 4 days, then increase to two capsules (75mg) on day five.  56 capsule 0    ARIPiprazole (ABILIFY) 10 MG tablet Take 1 tablet by mouth once daily 30 tablet 5    SYMBICORT 160-4.5 MCG/ACT AERO Inhale 2 puffs into the lungs 2 times daily 11 g 5    prazosin (MINIPRESS) 1 MG capsule Take 3 capsules by mouth nightly 90 capsule 0    levothyroxine (SYNTHROID) 50 MCG tablet Take 1 tablet by mouth once daily 90 tablet 3    buPROPion (WELLBUTRIN XL) 300 MG extended release tablet Take 1 tablet by mouth every morning 30 tablet 5    ibuprofen (ADVIL;MOTRIN) 200 MG tablet Take 200 mg by mouth every 6 hours as needed for Pain      fluticasone (FLONASE) 50 MCG/ACT nasal spray 1 spray by Each Nare route daily      acetaminophen (APAP EXTRA STRENGTH) 500 MG tablet Take 1 tablet by mouth every 6 hours as needed for Pain 120 tablet 3    clonazePAM (KLONOPIN) 1 MG tablet Take 1.5 tablets by mouth 2 times daily as needed for Anxiety for up to 30 days. 90 tablet 0     No current facility-administered medications for this visit. Allergies   Allergen Reactions    Bee Pollen        Health Maintenance   Topic Date Due    Pneumococcal 0-64 years Vaccine (1 of 2 - PPSV23) Never done    Cervical cancer screen  Never done    TSH testing  01/16/2021    COVID-19 Vaccine (2 - Booster for [employer] series) 05/20/2021    Flu vaccine (1) 09/01/2021    Lipid screen  08/29/2024    DTaP/Tdap/Td vaccine (2 - Td or Tdap) 01/14/2028    Hepatitis C screen  Completed    HIV screen  Completed    Hepatitis A vaccine  Aged Out    Hepatitis B vaccine  Aged Out    Hib vaccine  Aged Out    Meningococcal (ACWY) vaccine  Aged Out       Subjective:      Review of Systems   Constitutional: Negative for chills, fatigue and fever. HENT: Negative for ear pain, postnasal drip and trouble swallowing. Eyes: Negative for pain and visual disturbance. Respiratory: Negative for cough and shortness of breath. Cardiovascular: Negative for chest pain and palpitations. Gastrointestinal: Negative for abdominal pain, blood in stool, constipation, diarrhea, nausea and vomiting. Genitourinary: Negative for dysuria and urgency. Skin: Positive for rash. Negative for wound. Neurological: Negative for dizziness and headaches. Psychiatric/Behavioral: Negative for dysphoric mood. The patient is not nervous/anxious.         Objective: Vitals:    11/23/21 0826   BP: 122/76   Site: Right Upper Arm   Position: Sitting   Cuff Size: Medium Adult   Pulse: 76   Resp: 18   Temp: 96.4 °F (35.8 °C)   TempSrc: Temporal   SpO2: 99%   Weight: 192 lb (87.1 kg)   Height: 5' 7\" (1.702 m)       Body mass index is 30.07 kg/m². Wt Readings from Last 3 Encounters:   11/23/21 192 lb (87.1 kg)   11/20/21 190 lb (86.2 kg)   07/30/21 180 lb (81.6 kg)     BP Readings from Last 3 Encounters:   11/23/21 122/76   11/20/21 (!) 152/93   07/30/21 119/74       Physical Exam  Vitals and nursing note reviewed. Constitutional:       General: She is not in acute distress. Appearance: She is well-developed. She is not diaphoretic. HENT:      Head: Normocephalic and atraumatic. Right Ear: External ear normal.      Left Ear: External ear normal.      Nose: Nose normal.   Eyes:      General: No scleral icterus. Right eye: No discharge. Left eye: No discharge. Conjunctiva/sclera: Conjunctivae normal.   Cardiovascular:      Rate and Rhythm: Normal rate and regular rhythm. Heart sounds: Normal heart sounds. No murmur heard. Pulmonary:      Effort: Pulmonary effort is normal.      Breath sounds: Normal breath sounds. Musculoskeletal:      Cervical back: Normal range of motion. Skin:     General: Skin is warm and dry. Findings: No erythema or rash. Neurological:      Mental Status: She is alert and oriented to person, place, and time. Cranial Nerves: No cranial nerve deficit. Psychiatric:         Behavior: Behavior normal.         Thought Content: Thought content normal.         Judgment: Judgment normal.     1 cm swollen lymph node along the right posterior chain. Tender to palpation but freely movable. 5 to 6 cm erythematous rash along the right upper shoulder and right back. Likely secondary the contact dermatitis.     Lab Results   Component Value Date    WBC 5.5 10/25/2019    HGB 14.6 10/25/2019    HCT 46.0 10/25/2019     10/25/2019    CHOL 197 08/29/2019    TRIG 189 08/29/2019    HDL 34 08/29/2019    LDLCALC 125 08/29/2019    AST 14 10/25/2019     10/25/2019    K 3.7 10/25/2019     10/25/2019    CREATININE 0.8 10/25/2019    BUN 9 10/25/2019    CO2 25 10/25/2019    TSH 1.400 01/16/2020    INR 0.98 07/26/2019    LABA1C 5.4 08/29/2019    LABGLOM 79 (A) 10/25/2019    MG 1.7 07/22/2017    CALCIUM 9.5 10/25/2019       Imaging Results:    No results found. Assessment/Plan:     Ge Dc was seen today for other and rash. Diagnoses and all orders for this visit:    Irritant contact dermatitis due to other chemical products    Chest pain, unspecified type  -     TSH with Reflex; Future  -     CBC With Auto Differential; Future  -     Comprehensive Metabolic Panel; Future  -     Lipid Panel; Future  -     Stress test, myoview; Future  -     EKG 12 Lead; Future    Swollen lymph nodes    Hypothyroidism, unspecified type  -     TSH with Reflex; Future    Lab work ordered today, TSH, CBC, CMP, lipid panel. We will also order stress test with EKG to evaluate chest pain. Follow-up small lymph node in 1 month. Given patient's recent illness suspect that this is reactive in nature. Replaced TENS units pad with other brand. This is likely causing her to have contact dermatitis. She may use zinc oxide paste on the area to try to retain fluid to the area. Follow-up in about 4 weeks. Return in about 4 weeks (around 12/21/2021). Medications Prescribed:  No orders of the defined types were placed in this encounter. Future Appointments   Date Time Provider Yayo Boothe   12/29/2021  1:00 PM Rajan Romo, DO [facility] [place]   1/6/2022  2:00 PM Xavi Castillo DO [facility]  [facility] - [id number]       Patient given educational materials - see patient instructions. Discussed use, benefit, and sideeffects of prescribed medications. All patient questions answered.   Pt voiced understanding. Reviewed health maintenance. Instructed to continue current medications, diet and exercise. Patient agreed with treatment plan. Follow up as directed.      Electronically signed by Rudy Beyer DO on 11/23/2021 at 10:22 AM

## 2021-11-23 NOTE — PROGRESS NOTES
Elo Donahue is a 42 y. o.female    Chief Complaint   Patient presents with    Other     lump on neck    Rash     On back; thinks its from her tens unit she wears at work        Chief complaint, [place], and all pertinent details of the case reviewed with the resident. Please see resident's note for specific details discussed at today's visit. Complains of irritated rash on shoulder/back where TENS unit is placed. Tried steroid cream which burned area. Also has a swollen lymph  Node in back of head on right side, somewhat tender. Noticed it after cold symptoms a few weeks ago. Finally, does occasionally have chest pain and gets dyspneic with exertion.   Has risk factors for CAD and had stress test ordered in past but did not complete    Patient Active Problem List   Diagnosis    Anxiety    Scoliosis deformity of spine    Abdominal pain    Hypertriglyceridemia    Tobacco abuse counseling    Abdominal pain, right upper quadrant    Hypothyroidism    Hypokalemia    Hypopotassemia    Obesity with body mass index (BMI) of 30.0 to 39.9    Tobacco abuse    Metabolic acidosis    Hypocalcemia    Anemia    Asymptomatic bacteriuria    Leukopenia    Headache    Constipation    Acute cystitis    Epigastric pain    Atypical chest pain    Dyspnea    Intermittent palpitations    PTSD (post-traumatic stress disorder)    Depression    Colitis    Hematochezia    Panic disorder with agoraphobia    Gastritis    Depressive disorder    Tobacco dependence due to cigarettes    Adjustment disorder with mixed anxiety and depressed mood    TANMAY (generalized anxiety disorder)    Allergic rhinitis    Rhinorrhea       Current Outpatient Medications   Medication Sig Dispense Refill    predniSONE (DELTASONE) 20 MG tablet Take 1 tablet by mouth 2 times daily for 5 days 10 tablet 0    promethazine-dextromethorphan (PROMETHAZINE-DM) 6.25-15 MG/5ML syrup Take 5 mLs by mouth 4 times daily as needed for Cough 150 mL 0    venlafaxine (EFFEXOR XR) 37.5 MG extended release capsule Take one capsule (37.5mg) each morning for 4 days, then increase to two capsules (75mg) on day five. 56 capsule 0    ARIPiprazole (ABILIFY) 10 MG tablet Take 1 tablet by mouth once daily 30 tablet 5    SYMBICORT 160-4.5 MCG/ACT AERO Inhale 2 puffs into the lungs 2 times daily 11 g 5    prazosin (MINIPRESS) 1 MG capsule Take 3 capsules by mouth nightly 90 capsule 0    levothyroxine (SYNTHROID) 50 MCG tablet Take 1 tablet by mouth once daily 90 tablet 3    buPROPion (WELLBUTRIN XL) 300 MG extended release tablet Take 1 tablet by mouth every morning 30 tablet 5    ibuprofen (ADVIL;MOTRIN) 200 MG tablet Take 200 mg by mouth every 6 hours as needed for Pain      fluticasone (FLONASE) 50 MCG/ACT nasal spray 1 spray by Each Nare route daily      acetaminophen (APAP EXTRA STRENGTH) 500 MG tablet Take 1 tablet by mouth every 6 hours as needed for Pain 120 tablet 3    clonazePAM (KLONOPIN) 1 MG tablet Take 1.5 tablets by mouth 2 times daily as needed for Anxiety for up to 30 days. 90 tablet 0     No current facility-administered medications for this visit. Review of Systems per Dr. Laisha Reddy     /76 (Site: Right Upper Arm, Position: Sitting, Cuff Size: Medium Adult)   Pulse 76   Temp 96.4 °F (35.8 °C) (Temporal)   Resp 18   Ht 5' 7\" (1.702 m)   Wt 192 lb (87.1 kg)   SpO2 99%   BMI 30.07 kg/m²   BP Readings from Last 3 Encounters:   11/23/21 122/76   11/20/21 (!) 152/93   07/30/21 119/74       Wt Readings from Last 3 Encounters:   11/23/21 192 lb (87.1 kg)   11/20/21 190 lb (86.2 kg)   07/30/21 180 lb (81.6 kg)     Body mass index is 30.07 kg/m².     Physical Exam per Dr. Loreto Workman    Lab Results   Component Value Date    LABA1C 5.4 08/29/2019       Lab Results   Component Value Date    CHOL 197 08/29/2019    TRIG 189 08/29/2019    HDL 34 08/29/2019    LDLCALC 125 08/29/2019       The 10-year ASCVD risk score (Carlos Mehta, et al., 2013) is: 5.2%    Values used to calculate the score:      Age: 42 years      Sex: Female      Is Non- : No      Diabetic: No      Tobacco smoker: Yes      Systolic Blood Pressure: 915 mmHg      Is BP treated: No      HDL Cholesterol: 34 mg/dL      Total Cholesterol: 197 mg/dL    Lab Results   Component Value Date     10/25/2019    K 3.7 10/25/2019     10/25/2019    CO2 25 10/25/2019    BUN 9 10/25/2019    CREATININE 0.8 10/25/2019    GLUCOSE 88 10/25/2019    CALCIUM 9.5 10/25/2019    PROT 7.0 10/25/2019    LABALBU 4.1 10/25/2019    BILITOT 0.2 (L) 10/25/2019    ALKPHOS 74 10/25/2019    AST 14 10/25/2019    ALT 11 10/25/2019    LABGLOM 79 (A) 10/25/2019       Lab Results   Component Value Date    TSH 1.400 01/16/2020    T4FREE 0.93 08/13/2019       Lab Results   Component Value Date    WBC 5.5 10/25/2019    HGB 14.6 10/25/2019    HCT 46.0 10/25/2019    MCV 89.3 10/25/2019     10/25/2019         Future Appointments   Date Time Provider Yayo Boothe   11/23/2021  9:45 AM SCHEDULE, [facility] RM 1 [facility] [address]   12/29/2021  1:00 PM Rajan Romo, DO [facility] [address]   1/6/2022  2:00 PM Ingrid Bains, DO [facility]  RES [address]       ASSESSMENT       Diagnosis Orders   1. Irritant contact dermatitis due to other chemical products     2. Chest pain, unspecified type  TSH with Reflex    CBC With Auto Differential    Comprehensive Metabolic Panel    Lipid Panel    Stress test, myoview    EKG 12 Lead   3. Swollen lymph nodes     4. Hypothyroidism, unspecified type  TSH with Reflex       PLAN      After discussion with pt and Dr. Allie Rodgers, we agreed on plan as follows:    1.  fasting labs  2. Stress test ordered  3. Will monitor lymph node. If continues in 4 wks, consider labs/ US          Attending Physician Statement  I have discussed the case, including pertinent history and exam findings with the resident.  I also have seen the patient and performed key portions of the examination. I agree with the documented assessment and plan as documented by the resident.   GC modifier added to this encounter        Electronically signed by Rubén Rivera MD on 11/23/2021 at 9:44 AM

## 2021-12-06 ENCOUNTER — OFFICE VISIT (OUTPATIENT)
Dept: FAMILY MEDICINE CLINIC | Age: 41
End: 2021-12-06
Payer: COMMERCIAL

## 2021-12-06 ENCOUNTER — NURSE TRIAGE (OUTPATIENT)
Dept: OTHER | Facility: CLINIC | Age: 41
End: 2021-12-06

## 2021-12-06 VITALS
DIASTOLIC BLOOD PRESSURE: 86 MMHG | HEIGHT: 67 IN | RESPIRATION RATE: 16 BRPM | OXYGEN SATURATION: 98 % | TEMPERATURE: 96.7 F | HEART RATE: 73 BPM | SYSTOLIC BLOOD PRESSURE: 124 MMHG | WEIGHT: 196.6 LBS | BODY MASS INDEX: 30.86 KG/M2

## 2021-12-06 DIAGNOSIS — R07.9 CHEST PAIN, UNSPECIFIED TYPE: Primary | ICD-10-CM

## 2021-12-06 DIAGNOSIS — R06.02 SHORTNESS OF BREATH: ICD-10-CM

## 2021-12-06 DIAGNOSIS — F40.01 PANIC DISORDER WITH AGORAPHOBIA: ICD-10-CM

## 2021-12-06 PROCEDURE — G8427 DOCREV CUR MEDS BY ELIG CLIN: HCPCS | Performed by: STUDENT IN AN ORGANIZED HEALTH CARE EDUCATION/TRAINING PROGRAM

## 2021-12-06 PROCEDURE — 93000 ELECTROCARDIOGRAM COMPLETE: CPT | Performed by: STUDENT IN AN ORGANIZED HEALTH CARE EDUCATION/TRAINING PROGRAM

## 2021-12-06 PROCEDURE — 99214 OFFICE O/P EST MOD 30 MIN: CPT | Performed by: STUDENT IN AN ORGANIZED HEALTH CARE EDUCATION/TRAINING PROGRAM

## 2021-12-06 PROCEDURE — G8417 CALC BMI ABV UP PARAM F/U: HCPCS | Performed by: STUDENT IN AN ORGANIZED HEALTH CARE EDUCATION/TRAINING PROGRAM

## 2021-12-06 PROCEDURE — G8484 FLU IMMUNIZE NO ADMIN: HCPCS | Performed by: STUDENT IN AN ORGANIZED HEALTH CARE EDUCATION/TRAINING PROGRAM

## 2021-12-06 PROCEDURE — 4004F PT TOBACCO SCREEN RCVD TLK: CPT | Performed by: STUDENT IN AN ORGANIZED HEALTH CARE EDUCATION/TRAINING PROGRAM

## 2021-12-06 RX ORDER — TRAZODONE HYDROCHLORIDE 100 MG/1
TABLET ORAL
COMMUNITY
Start: 2021-11-02

## 2021-12-06 ASSESSMENT — ENCOUNTER SYMPTOMS
VOMITING: 0
BACK PAIN: 0
ABDOMINAL PAIN: 0
SHORTNESS OF BREATH: 1
COUGH: 0
NAUSEA: 0

## 2021-12-06 NOTE — TELEPHONE ENCOUNTER
Reason for Disposition   Chest pain lasting longer than 5 minutes and occurred in last 3 days (72 hours) (Exception: feels exactly the same as previously diagnosed heartburn and has accompanying sour taste in mouth)    Answer Assessment - Initial Assessment Questions  1. LOCATION: \"Where does it hurt? \"        Right in the middle of her chest    2. RADIATION: \"Does the pain go anywhere else? \" (e.g., into neck, jaw, arms, back)      \"Sometimes I feel it in my back but not often\"    3. ONSET: \"When did the chest pain begin? \" (Minutes, hours or days)       Onset was 2 years ago - \"I saw a Cardiologist who was going to order a stress test  My insurance denied the procedure and I never went back - but this pain is getting worse and happening more frequently\"    4. PATTERN \"Does the pain come and go, or has it been constant since it started? \"  \"Does it get worse with exertion? \"       Intermittent pain - random events that are becoming frequent - stress seems to make it hurt more    5. DURATION: \"How long does it last\" (e.g., seconds, minutes, hours)      Usually lasts about 5 minutes    6. SEVERITY: \"How bad is the pain? \"  (e.g., Scale 1-10; mild, moderate, or severe)     - MILD (1-3): doesn't interfere with normal activities      - MODERATE (4-7): interferes with normal activities or awakens from sleep     - SEVERE (8-10): excruciating pain, unable to do any normal activities        During an event pain level is 8/10; Currently not having pain    7. CARDIAC RISK FACTORS: \"Do you have any history of heart problems or risk factors for heart disease? \" (e.g., angina, prior heart attack; diabetes, high blood pressure, high cholesterol, smoker, or strong family history of heart disease)      Smoker, strong family history of heart issues    8. PULMONARY RISK FACTORS: \"Do you have any history of lung disease? \"  (e.g., blood clots in lung, asthma, emphysema, birth control pills)      No    9. CAUSE: \"What do you think is causing the chest pain? \"      Unknown but does report that stress seems to make it worse    10. OTHER SYMPTOMS: \"Do you have any other symptoms? \" (e.g., dizziness, nausea, vomiting, sweating, fever, difficulty breathing, cough)        Sweating, hands and feet get cold and clammy and feet get cold and clammy with pain events     11. PREGNANCY: \"Is there any chance you are pregnant? \" \"When was your last menstrual period? \"        No    Protocols used: CHEST PAIN-ADULT-[place]    Received call from Lucas Paniagua at [place] [facility] with [employer]. Brief description of triage: Patient has been experiencing worsening and more frequent chest pain  She was seen by [facility] on 11/23 who recommended a stress test (per pt). 2nd level triage completed with Blaine Ramesh CNP; provider recommends patient be seen in the next few days by her provider and that she call scheduling to set an appt for the EKG and stress test that are currently ordered. Triage indicates for patient to be seen in the [address] or PCP with approval.    Care advice provided, patient verbalizes understanding; denies any other questions or concerns; instructed to call back for any new or worsening symptoms. Writer provided warm transfer to Dafne Martínez  at [place] [facility] for appointment scheduling and for scheduling the EKG ans Stress test that are currently on order. Attention Provider: Thank you for allowing me to participate in the care of your patient. The patient was connected to triage in response to information provided to the ECC/PSC. Please do not respond through this encounter as the response is not directed to a shared pool.

## 2021-12-06 NOTE — PROGRESS NOTES
Vincent Paul is a 42 y.o. female who presents today for:  Chief Complaint   Patient presents with    Chest Pain     off and on for couple years       Goals      Exercise 3x per week (30 min per time)           HPI:     Chest Pain   This is a chronic problem. The current episode started more than 1 year ago. The onset quality is gradual. The problem occurs 2 to 4 times per day. The problem has been gradually worsening. The pain is present in the substernal region. The pain is at a severity of 0/10. The patient is experiencing no pain. The pain radiates to the right shoulder and upper back. Associated symptoms include dizziness, malaise/fatigue, palpitations and shortness of breath. Pertinent negatives include no abdominal pain, back pain, cough, headaches, irregular heartbeat, nausea, numbness, syncope, vomiting or weakness. She has tried acetaminophen for the symptoms. The treatment provided no relief. Risk factors include smoking/tobacco exposure and obesity. Her past medical history is significant for anxiety/panic attacks. Pertinent negatives for past medical history include no CAD, no CHF and no MI. Her family medical history is significant for CAD and diabetes. Pertinent negatives for family medical history include: no sudden death. Current Outpatient Medications   Medication Sig Dispense Refill    clonazePAM (KLONOPIN) 1 MG tablet Take 1.5 tablets by mouth 2 times daily as needed for Anxiety for up to 30 days. 90 tablet 0    venlafaxine (EFFEXOR XR) 37.5 MG extended release capsule Take one capsule (37.5mg) each morning for 4 days, then increase to two capsules (75mg) on day five.  56 capsule 0    ARIPiprazole (ABILIFY) 10 MG tablet Take 1 tablet by mouth once daily 30 tablet 5    SYMBICORT 160-4.5 MCG/ACT AERO Inhale 2 puffs into the lungs 2 times daily 11 g 5    prazosin (MINIPRESS) 1 MG capsule Take 3 capsules by mouth nightly 90 capsule 0    levothyroxine (SYNTHROID) 50 MCG tablet Take 1 tablet by mouth once daily 90 tablet 3    buPROPion (WELLBUTRIN XL) 300 MG extended release tablet Take 1 tablet by mouth every morning 30 tablet 5    ibuprofen (ADVIL;MOTRIN) 200 MG tablet Take 200 mg by mouth every 6 hours as needed for Pain      fluticasone (FLONASE) 50 MCG/ACT nasal spray 1 spray by Each Nare route daily      acetaminophen (APAP EXTRA STRENGTH) 500 MG tablet Take 1 tablet by mouth every 6 hours as needed for Pain 120 tablet 3    traZODone (DESYREL) 100 MG tablet        No current facility-administered medications for this visit. Food Insecurity: No Food Insecurity    Worried About Running Out of Food in the Last Year: Never true    Ran Out of Food in the Last Year: Never true       Health Maintenance   Topic Date Due    Pneumococcal 0-64 years Vaccine (1 of 2 - PPSV23) Never done    Cervical cancer screen  Never done    COVID-19 Vaccine (2 - Booster for [employer]LL[employer] series) 05/20/2021    Flu vaccine (1) 09/01/2021    TSH testing  11/23/2022    Lipid screen  11/23/2026    DTaP/Tdap/Td vaccine (2 - Td or Tdap) 01/14/2028    Hepatitis C screen  Completed    HIV screen  Completed    Hepatitis A vaccine  Aged Out    Hepatitis B vaccine  Aged Out    Hib vaccine  Aged Out    Meningococcal (ACWY) vaccine  Aged Out       ROS:      Review of Systems   Constitutional: Positive for malaise/fatigue. Respiratory: Positive for shortness of breath. Negative for cough. Cardiovascular: Positive for chest pain and palpitations. Negative for syncope. Gastrointestinal: Negative for abdominal pain, nausea and vomiting. Musculoskeletal: Negative for back pain. Neurological: Positive for dizziness. Negative for weakness, numbness and headaches.          Objective:     Vitals:    12/06/21 1545   BP: 124/86   Site: Right Upper Arm   Position: Sitting   Cuff Size: Medium Adult   Pulse: 73   Resp: 16   Temp: 96.7 °F (35.9 °C)   TempSrc: Skin   SpO2: 98%   Weight: 196 lb 9.6 oz (89.2 kg)   Height: 5' 7\" (1.702 m)       Body mass index is 30.79 kg/m². Wt Readings from Last 3 Encounters:   12/06/21 196 lb 9.6 oz (89.2 kg)   11/23/21 192 lb (87.1 kg)   11/20/21 190 lb (86.2 kg)     BP Readings from Last 3 Encounters:   12/06/21 124/86   11/23/21 122/76   11/20/21 (!) 152/93       Physical Exam  Vitals and nursing note reviewed. Constitutional:       General: She is not in acute distress. Appearance: Normal appearance. She is not ill-appearing, toxic-appearing or diaphoretic. HENT:      Head: Normocephalic and atraumatic. Right Ear: External ear normal.      Left Ear: External ear normal.      Nose: Nose normal.   Eyes:      General:         Right eye: No discharge. Left eye: No discharge. Conjunctiva/sclera: Conjunctivae normal.   Cardiovascular:      Rate and Rhythm: Normal rate and regular rhythm. Heart sounds: Normal heart sounds. No murmur heard. Pulmonary:      Effort: No respiratory distress. Breath sounds: Wheezing present. No rhonchi or rales. Musculoskeletal:      Cervical back: Normal range of motion and neck supple. Right lower leg: No edema. Left lower leg: No edema. Skin:     General: Skin is warm and dry. Capillary Refill: Capillary refill takes less than 2 seconds. Findings: No erythema or rash. Neurological:      General: No focal deficit present. Mental Status: She is alert and oriented to person, place, and time. Psychiatric:      Comments: Anxious affect       Assessment / Plan:     1. Chest pain, unspecified type  Patient presents to clinic today for the evaluation of chest pain. Her chest pain is most likely related to anxiety or asthma. EKG in clinic today showed sinus bradycardia but otherwise was normal.  Patient was instructed to make sure that she gets her stress test done as previously ordered.   She is given strict return precautions to return to clinic if her chest pain were to worsen. Patient will follow up in clinic as needed and at her regularly scheduled appointment at the end of the month. - Full PFT Study With Bronchodilator; Future  - EKG 12 Lead  - EKG 12 Lead    2. Shortness of breath  Patient is complaining of shortness of breath today. She is a smoker. She has not had smoked since her chest pain started. Patient has never had PFTs done. We will check PFTs and evaluate her on her next appointment. Consider chest x-ray if her symptoms do not improve. - Full PFT Study With Bronchodilator; Future    3. Panic disorder with agoraphobia  Patient has a history of panic disorder. She is being followed by psychiatry for this problem. She is encouraged to discuss her symptoms with her psychiatrist to see if her medications need to be adjusted. We will follow this up at her next appointment. Return if symptoms worsen or fail to improve. Medications Prescribed:  No orders of the defined types were placed in this encounter. Future Appointments   Date Time Provider Yayo Boothe   12/29/2021  1:00 PM Rajan Romo DO [facility] [place]   1/6/2022  2:00 PM [facility]       Patient given educational materials - see patient instructions. Discussed use, benefit, and side effects of prescribed medications. All patient questions answered. Patient voiced understanding. Reviewed health maintenance. Instructed to continue current medications, diet and exercise. Patient agreed with treatment plan. Follow up as directed.      Electronically signed by Sariah Ordoñez MD on 12/13/2021 at 12:54 PM

## 2021-12-21 ENCOUNTER — TELEPHONE (OUTPATIENT)
Dept: FAMILY MEDICINE CLINIC | Age: 41
End: 2021-12-21

## 2021-12-21 NOTE — TELEPHONE ENCOUNTER
Spoke to the PA department at [facility]. They said that they work on the PAs for the next date of service. So they are working on tests needing authed that are sched for tomorrow. They said that they pt needs to sched the test first so they can auth it as they need to know where the pt is going to get it done and what date to submit it for. Attempted to contact pt to inform her of this. VM box is not set up.

## 2021-12-21 NOTE — TELEPHONE ENCOUNTER
----- Message from Chela Monge sent at 12/20/2021  9:40 AM EST -----  Subject: Message to Provider    QUESTIONS  Information for Provider? pt states her insurance is supposed to fax over   an approval for a stress test to the office, but insurance has no record   of a stress test being ordered for her. i do not see one in her orders   tab. pt requesting a call back regarding this, thank you.   ---------------------------------------------------------------------------  --------------  CALL BACK INFO  What is the best way for the office to contact you? OK to leave message on   voicemail  Preferred Call Back Phone Number? [phone]  ---------------------------------------------------------------------------  --------------  SCRIPT ANSWERS  Relationship to Patient?  Self

## 2021-12-21 NOTE — LETTER
[address] [address],[address] [facility]  [address] [facility]  Phone: [phone]  Fax: [phone]    Marie Antoine DO      December 23, 2021    [address] [address]      Dear Burke Daniel: We have made several attempts to contact you by phone and have   been unsuccessful. Please call our office at your earliest convenience  At [phone] opt [phone]. Thank you.       Sincerely,        Marie Antoine DO

## 2022-01-02 NOTE — TELEPHONE ENCOUNTER
Patient's last appointment was : 12/6/2021  Patient's next appointment is : 1/6/2022  Last refilled: 3/24/21

## 2022-01-03 RX ORDER — LEVOTHYROXINE SODIUM 0.05 MG/1
TABLET ORAL
Qty: 90 TABLET | Refills: 0 | Status: SHIPPED | OUTPATIENT
Start: 2022-01-03 | End: 2022-07-14 | Stop reason: SDUPTHER

## 2022-01-24 ENCOUNTER — TELEMEDICINE (OUTPATIENT)
Dept: FAMILY MEDICINE CLINIC | Age: 42
End: 2022-01-24
Payer: COMMERCIAL

## 2022-01-24 DIAGNOSIS — R59.9 SWOLLEN LYMPH NODES: Primary | ICD-10-CM

## 2022-01-24 PROCEDURE — G8417 CALC BMI ABV UP PARAM F/U: HCPCS | Performed by: FAMILY MEDICINE

## 2022-01-24 PROCEDURE — G8484 FLU IMMUNIZE NO ADMIN: HCPCS | Performed by: FAMILY MEDICINE

## 2022-01-24 PROCEDURE — 99212 OFFICE O/P EST SF 10 MIN: CPT | Performed by: FAMILY MEDICINE

## 2022-01-24 PROCEDURE — 4004F PT TOBACCO SCREEN RCVD TLK: CPT | Performed by: FAMILY MEDICINE

## 2022-01-24 PROCEDURE — G8427 DOCREV CUR MEDS BY ELIG CLIN: HCPCS | Performed by: FAMILY MEDICINE

## 2022-01-24 ASSESSMENT — ENCOUNTER SYMPTOMS
NAUSEA: 0
COUGH: 0
SHORTNESS OF BREATH: 0
COLOR CHANGE: 0
VOMITING: 0

## 2022-01-24 NOTE — PROGRESS NOTES
S: 42 y.o. female with   Chief Complaint   Patient presents with    Adenopathy       HPI: please see resident note for HPI and ROS. BP Readings from Last 3 Encounters:   12/06/21 124/86   11/23/21 122/76   11/20/21 (!) 152/93     Wt Readings from Last 3 Encounters:   12/06/21 196 lb 9.6 oz (89.2 kg)   11/23/21 192 lb (87.1 kg)   11/20/21 190 lb (86.2 kg)       Dx: LAD    Plan:  Resolved LAD. Health Maintenance Due   Topic Date Due    Pneumococcal 0-64 years Vaccine (1 of 2 - PPSV23) Never done    Depression Monitoring  Never done    Cervical cancer screen  Never done    COVID-19 Vaccine (2 - Booster for [employer] series) 05/20/2021    Flu vaccine (1) 09/01/2021       Attending Physician Statement  I have discussed the case, including pertinent history and exam findings with the resident. I agree with the documented assessment and plan as documented by the resident.         Burak Rider DO 1/24/2022 12:09 PM

## 2022-01-24 NOTE — PROGRESS NOTES
2022    TELEHEALTH EVALUATION -- Audio (During WXADK-08 public health emergency)    HPI:    Max Bethea (:  1980) has requested an audio/video evaluation for the following concern(s):    Reports a huge lump on her neck 2 weeks ago, but not a problem anymore. No swelling anymore. Thinks she had a lump on her neck due to a head cold. Denies any fever, night sweats, or weight loss. No neck pain. Review of Systems   Constitutional: Negative for activity change, chills and fever. Respiratory: Negative for cough and shortness of breath. Cardiovascular: Negative for chest pain. Gastrointestinal: Negative for nausea and vomiting. Skin: Negative for color change and rash. Neurological: Negative for headaches. Prior to Visit Medications    Medication Sig Taking? Authorizing Provider   levothyroxine (EUTHYROX) 50 MCG tablet Take 1 tablet by mouth once daily  Bandar Rosenberg DO   traZODone (DESYREL) 100 MG tablet   Historical Provider, MD   clonazePAM (KLONOPIN) 1 MG tablet Take 1.5 tablets by mouth 2 times daily as needed for Anxiety for up to 30 days. Bandar Rosenberg DO   venlafaxine (EFFEXOR XR) 37.5 MG extended release capsule Take one capsule (37.5mg) each morning for 4 days, then increase to two capsules (75mg) on day five.   Johnnie Henriquez DO   ARIPiprazole (ABILIFY) 10 MG tablet Take 1 tablet by mouth once daily  Johnnie Henriquez DO   SYMBICORT 160-4.5 MCG/ACT AERO Inhale 2 puffs into the lungs 2 times daily  Bandar Rosenberg DO   prazosin (MINIPRESS) 1 MG capsule Take 3 capsules by mouth nightly  Mat Sport, APRN - CNP   buPROPion (WELLBUTRIN XL) 300 MG extended release tablet Take 1 tablet by mouth every morning  Shine Mendez MD   ibuprofen (ADVIL;MOTRIN) 200 MG tablet Take 200 mg by mouth every 6 hours as needed for Pain  Historical Provider, MD   fluticasone (FLONASE) 50 MCG/ACT nasal spray 1 spray by Each Nare route daily  Historical Provider, MD   acetaminophen (APAP EXTRA STRENGTH) 500 MG tablet Take 1 tablet by mouth every 6 hours as needed for Pain  Ángela Wakefield PA-C       Social History     Tobacco Use    Smoking status: Current Every Day Smoker     Packs/day: 1.00     Years: 22.00     Pack years: 22.00     Types: Cigarettes     Start date: 1/16/1998    Smokeless tobacco: Never Used   Vaping Use    Vaping Use: Never used   Substance Use Topics    Alcohol use: Not Currently     Comment: social drinking    Drug use: No          PHYSICAL EXAMINATION:  N/A    ASSESSMENT/PLAN:  1. Swollen lymph nodes  - Resolved. Likely due to viral URI in 2 weeks. Return if symptoms worsen or fail to improve. Sue Crump, was evaluated through a synchronous (real-time) audio-video encounter. The patient (or guardian if applicable) is aware that this is a billable service, which includes applicable co-pays. This Virtual Visit was conducted with patient's (and/or legal guardian's) consent. The visit was conducted pursuant to the emergency declaration under the [address] [place] [address] authority and the [employer] and [employer] General Act. Patient identification was verified, and a caregiver was present when appropriate. The patient was located at home in a state where the provider was licensed to provide care. Total time spent on this encounter: Not billed by time    --Ivelisse Du MD on 1/24/2022 at 12:54 PM    An electronic signature was used to authenticate this note.

## 2022-05-02 ENCOUNTER — HOSPITAL ENCOUNTER (EMERGENCY)
Age: 42
Discharge: HOME | End: 2022-05-02
Attending: EMERGENCY MEDICINE
Payer: COMMERCIAL

## 2022-05-02 ENCOUNTER — APPOINTMENT (OUTPATIENT)
Dept: GENERAL RADIOLOGY | Age: 42
End: 2022-05-02
Payer: COMMERCIAL

## 2022-05-02 VITALS
SYSTOLIC BLOOD PRESSURE: 134 MMHG | OXYGEN SATURATION: 100 % | HEART RATE: 78 BPM | RESPIRATION RATE: 16 BRPM | TEMPERATURE: 98.2 F | DIASTOLIC BLOOD PRESSURE: 97 MMHG | BODY MASS INDEX: 32.96 KG/M2 | WEIGHT: 210 LBS | HEIGHT: 67 IN

## 2022-05-02 DIAGNOSIS — R06.2 WHEEZING: Primary | ICD-10-CM

## 2022-05-02 LAB
FLU A ANTIGEN: NEGATIVE
FLU B ANTIGEN: NEGATIVE
SARS-COV-2, NAAT: NOT  DETECTED

## 2022-05-02 PROCEDURE — 87804 INFLUENZA ASSAY W/OPTIC: CPT

## 2022-05-02 PROCEDURE — 94761 N-INVAS EAR/PLS OXIMETRY MLT: CPT

## 2022-05-02 PROCEDURE — 99284 EMERGENCY DEPT VISIT MOD MDM: CPT

## 2022-05-02 PROCEDURE — 87635 SARS-COV-2 COVID-19 AMP PRB: CPT

## 2022-05-02 PROCEDURE — 71046 X-RAY EXAM CHEST 2 VIEWS: CPT

## 2022-05-02 PROCEDURE — 6370000000 HC RX 637 (ALT 250 FOR IP): Performed by: STUDENT IN AN ORGANIZED HEALTH CARE EDUCATION/TRAINING PROGRAM

## 2022-05-02 PROCEDURE — 94640 AIRWAY INHALATION TREATMENT: CPT

## 2022-05-02 RX ORDER — IPRATROPIUM BROMIDE AND ALBUTEROL SULFATE 2.5; .5 MG/3ML; MG/3ML
3 SOLUTION RESPIRATORY (INHALATION) ONCE
Status: COMPLETED | OUTPATIENT
Start: 2022-05-02 | End: 2022-05-02

## 2022-05-02 RX ORDER — IPRATROPIUM BROMIDE AND ALBUTEROL SULFATE 2.5; .5 MG/3ML; MG/3ML
3 SOLUTION RESPIRATORY (INHALATION)
Status: DISCONTINUED | OUTPATIENT
Start: 2022-05-02 | End: 2022-05-02

## 2022-05-02 RX ADMIN — IPRATROPIUM BROMIDE AND ALBUTEROL SULFATE 3 AMPULE: .5; 3 SOLUTION RESPIRATORY (INHALATION) at 08:36

## 2022-05-02 ASSESSMENT — ENCOUNTER SYMPTOMS
COLOR CHANGE: 0
SORE THROAT: 1
CHEST TIGHTNESS: 0
DIARRHEA: 0
CONSTIPATION: 0
BACK PAIN: 0
WHEEZING: 1
COUGH: 1
SHORTNESS OF BREATH: 1
VOMITING: 0
SINUS PRESSURE: 1
SINUS PAIN: 0
ABDOMINAL PAIN: 0
NAUSEA: 0

## 2022-05-02 ASSESSMENT — PAIN - FUNCTIONAL ASSESSMENT: PAIN_FUNCTIONAL_ASSESSMENT: 0-10

## 2022-05-02 ASSESSMENT — PAIN SCALES - GENERAL: PAINLEVEL_OUTOF10: 5

## 2022-05-02 ASSESSMENT — PAIN DESCRIPTION - LOCATION: LOCATION: CHEST

## 2022-05-02 ASSESSMENT — PAIN DESCRIPTION - DESCRIPTORS: DESCRIPTORS: ACHING

## 2022-05-02 NOTE — ED PROVIDER NOTES
[address] MEDICINE ATTENDING ATTESTATION      Evaluation of Neftali Ventura. Case discussed and care plan developed with resident physician, Dr. Annabella Gates. I agree with the resident physician documentation and plan as documented by him, except if my documentation differs. Patient seen, interviewed and examined by me. I reviewed the medical, surgical, family and social history, medications and allergies. I have reviewed the nursing documentation. I have reviewed the patient's vital signs and are abnormal from Mild hypertension per my interpretation. Body mass index is 32.89 kg/m². Pulsoxymetry is normal per my interpretation. Brief H&P   Patient c/o persistent cough, requesting letter for work work for today. Patient has history of asthma, has his inhaler once at home but has not been using them. Physical exam is notable for well appearing, mild diffuse bilateral wheezing, patient speaks in full sentences. No leg edema. Medical Decision Making   MDM:   1. Mild acute asthma exacerbation  Plan:    Nebulized medication, start steroids   Observation in the ED during treatment. Please see the resident physician completed note for final disposition except as documented on this attestation. I have reviewed and interpreted all available lab, radiology and ekg results available at the moment. Diagnosis, treatment and disposition plans were discussed and agreed upon by patient. This transcription was electronically signed. It was dictated by use of voice recognition software and electronically transcribed. The transcription may contain errors not detected in proofreading.      I performed direct supervision and was present for the critical portion following procedures: None  Critical care time on this case: None    Electronically signed by Farida Osborn MD on 5/2/22 at 9:23 AM EDT       Farida Osborn MD  05/02/22 [id number]

## 2022-05-02 NOTE — ED NOTES
Pt resting in bed, respirations easy and unlabored. Call light in reach.      [facility]) JORGE Brewster RN  05/02/22 [id number]

## 2022-05-02 NOTE — ED NOTES
Presents to ER with complaints of a head cold that has been ongoing for 2 weeks. States she feels she is not getting any better. Takes DayQuil with minimal relief. Pt states she needs a work note for missing work today.      Brandi Brewster RN  05/02/22 [id number]

## 2022-05-02 NOTE — ED PROVIDER NOTES
[facility] ENCOUNTER        Pt Name: Hoa Tim  MRN: [id number]  [place] 1980  Date of evaluation: 5/2/2022  Treating Resident Physician: Nita Zavala DO  Supervising Physician: Geovany       Chief Complaint   Patient presents with    Letter for School/Work    Cough     History obtained from the patient. HISTORY OF PRESENT ILLNESS    HPI  Hoa Tim is a 36 y.o. female who presents to the emergency department for evaluation of head cold, cough, wheezing and sore throat. Symptoms began 2 weeks ago with the discomfort in the throat. That has since resolved but developed into persistent wheezing, shortness of breath and cough. Patient denies taking the Symbicort that she was prescribed by her PCP. Is up-to-date and has been taking all of her psychiatric medications. Attempted DayQuil over-the-counter with minimal relief. Requesting note for work, as she had to miss to come to the emergency department today. No known aggravating or alleviating factors. Associated dry cough and audible wheezes. The patient has no other acute complaints at this time. REVIEW OF SYSTEMS   Review of Systems   Constitutional: Positive for fatigue. Negative for chills and fever. HENT: Positive for congestion, sinus pressure and sore throat. Negative for ear pain, postnasal drip and sinus pain. Respiratory: Positive for cough, shortness of breath and wheezing. Negative for chest tightness. Cardiovascular: Negative for chest pain, palpitations and leg swelling. Gastrointestinal: Negative for abdominal pain, constipation, diarrhea, nausea and vomiting. Endocrine: Negative for cold intolerance and heat intolerance. Genitourinary: Negative for difficulty urinating and dysuria. Musculoskeletal: Negative for back pain, neck pain and neck stiffness. Skin: Negative for color change and rash.    Neurological: Negative for dizziness, syncope, weakness, light-headedness, numbness and headaches. PAST MEDICAL AND SURGICAL HISTORY     Past Medical History:   Diagnosis Date    Abdominal pain, right upper quadrant     Acute cystitis 11/5/2016    Allergic rhinitis     Anxiety     CAD (coronary artery disease)     Depression (emotion)     DUB (dysfunctional uterine bleeding)     Generalized anxiety disorder     GERD (gastroesophageal reflux disease)     Hypertriglyceridemia 11/11/2014    Hypoglycemia     Hypothyroid 8/10/2014    Movement disorder     Pancreatitis 11/10/14    Psychiatric problem     Scoliosis     Tobacco abuse      Past Surgical History:   Procedure Laterality Date    COLONOSCOPY N/A 7/27/2019    COLONOSCOPY WITH BIOPSY performed by Lavinia Montoya MD at [address] Right 08/2017    KNEE SURGERY Right 02/11/2020         MEDICATIONS   No current facility-administered medications for this encounter.     Current Outpatient Medications:     levothyroxine (EUTHYROX) 50 MCG tablet, Take 1 tablet by mouth once daily, Disp: 90 tablet, Rfl: 0    traZODone (DESYREL) 100 MG tablet, , Disp: , Rfl:     venlafaxine (EFFEXOR XR) 37.5 MG extended release capsule, Take one capsule (37.5mg) each morning for 4 days, then increase to two capsules (75mg) on day five., Disp: 56 capsule, Rfl: 0    ARIPiprazole (ABILIFY) 10 MG tablet, Take 1 tablet by mouth once daily, Disp: 30 tablet, Rfl: 5    SYMBICORT 160-4.5 MCG/ACT AERO, Inhale 2 puffs into the lungs 2 times daily, Disp: 11 g, Rfl: 5    prazosin (MINIPRESS) 1 MG capsule, Take 3 capsules by mouth nightly, Disp: 90 capsule, Rfl: 0    buPROPion (WELLBUTRIN XL) 300 MG extended release tablet, Take 1 tablet by mouth every morning, Disp: 30 tablet, Rfl: 5    ibuprofen (ADVIL;MOTRIN) 200 MG tablet, Take 200 mg by mouth every 6 hours as needed for Pain, Disp: , Rfl:     fluticasone (FLONASE) 50 MCG/ACT nasal spray, 1 spray by Each Nare route daily, Disp: , Rfl:     acetaminophen (APAP EXTRA STRENGTH) 500 MG tablet, Take 1 tablet by mouth every 6 hours as needed for Pain, Disp: 120 tablet, Rfl: 3      SOCIAL HISTORY     Social History     Social History Narrative    Not on file     Social History     Tobacco Use    Smoking status: Current Every Day Smoker     Packs/day: 1.00     Years: 22.00     Pack years: 22.00     Types: Cigarettes     Start date: 1/16/1998    Smokeless tobacco: Never Used   Vaping Use    Vaping Use: Never used   Substance Use Topics    Alcohol use: Not Currently     Comment: social drinking    Drug use: No         ALLERGIES     Allergies   Allergen Reactions    Bee Pollen          FAMILY HISTORY     Family History   Problem Relation Age of Onset    Diabetes Mother     High Blood Pressure Mother     Thyroid Disease Mother     Heart Disease Father     Diabetes Father     Cancer Sister         cervical         PREVIOUS RECORDS   Previous records reviewed: Patient last seen in November 2020 for foot pain. Doug Lua PHYSICAL EXAM     ED Triage Vitals [05/02/22 0742]   BP Temp Temp Source Pulse Resp SpO2 Height Weight   (!) 134/97 98.2 °F (36.8 °C) Oral 76 17 98 % 5' 7\" (1.702 m) 210 lb (95.3 kg)     Initial vital signs and nursing assessment reviewed and normal. Body mass index is 32.89 kg/m². Pulsoximetry is normal per my interpretation. Additional Vital Signs:  Vitals:    05/02/22 0852   BP:    Pulse: 78   Resp: 16   Temp:    SpO2: 100%       Physical Exam  Constitutional:       General: She is not in acute distress. Appearance: She is obese. She is not ill-appearing, toxic-appearing or diaphoretic. HENT:      Head: Normocephalic and atraumatic. Mouth/Throat:      Mouth: Mucous membranes are moist.      Pharynx: Oropharynx is clear. No oropharyngeal exudate or posterior oropharyngeal erythema. Eyes:      General: No scleral icterus. Right eye: No discharge. Left eye: No discharge. Extraocular Movements: Extraocular movements intact. Conjunctiva/sclera: Conjunctivae normal.   Cardiovascular:      Rate and Rhythm: Normal rate and regular rhythm. Pulses: Normal pulses. Heart sounds: No murmur heard. No friction rub. No gallop. Pulmonary:      Effort: Pulmonary effort is normal.      Breath sounds: Wheezing (Significant diffuse bilateral end expiratory) present. No rhonchi or rales. Abdominal:      Palpations: Abdomen is soft. Tenderness: There is no abdominal tenderness. There is no guarding or rebound. Musculoskeletal:      Cervical back: Normal range of motion. No rigidity. Right lower leg: No edema. Left lower leg: No edema. Skin:     General: Skin is warm and dry. Capillary Refill: Capillary refill takes less than 2 seconds. Neurological:      General: No focal deficit present. Mental Status: She is alert and oriented to person, place, and time. MEDICAL DECISION MAKING   Initial Assessment:   1. Pleasant 50-year-old female sitting up in the cot no acute distress. Vital signs are stable without hypotension, fever, tachycardia, tachypnea or hypoxia. History of moderate persistent reactive airway disease as diagnosed by PCP. Patient denied taking her Symbicort. Diffuse bilateral end expiratory wheezes. Differential diagnosis includes was not limited to reactive airway exacerbation, pneumonia, viral URI, COVID-19, unlikely pneumothorax, ACS, CHF, pulmonary embolus. Plan:    Breathing treatment   Steroids   Chest x-ray   Viral swabs   Work note   Magnesium if needed   Expected disposition discharge home with outpatient follow-up      ED RESULTS   Laboratory results:  Labs Reviewed   COVID-19, RAPID   RAPID INFLUENZA A/B ANTIGENS       Radiologic studies results:  XR CHEST (2 VW)   Final Result   There is no acute intrathoracic process.                **This report has been created using voice recognition software. It may contain minor errors which are inherent in voice recognition technology. **      Final report electronically signed by Dr Izaiah Easton on 5/2/2022 8:11 AM          ED Medications administered this visit:   Medications   ipratropium-albuterol (DUONEB) nebulizer solution 3 ampule (3 ampules Inhalation Given 5/2/22 0836)         ED COURSE     ED Course as of 05/02/22 0923   Mon May 02, 2022   0920 Patient feeling much better after the DuoNeb treatments. Still with mild end expiratory wheezes but much improved from presentation. She is asking to go home at this time. I counseled her on the importance of taking her home Symbicort. States she has it available and will use it as prescribed. [EM]   8618 Discussed strict return precautions include worsening respiratory distress and difficulty breathing for which the patient agrees. Also recommend she follow-up with her primary doctor in the next 1 to 2 days. [EM]      ED Course User Index  [EM] Luana Leblanc DO         Strict return precautions and follow up instructions were discussed with the patient prior to discharge, with which the patient agrees. MEDICATION CHANGES     Current Discharge Medication List            FINAL DISPOSITION     Final diagnoses:   Wheezing     Condition: condition: stable  Dispo: Discharge to home      This transcription was electronically signed. Parts of this transcriptions may have been dictated by use of voice recognition software and electronically transcribed, and parts may have been transcribed with the assistance of an ED scribe. The transcription may contain errors not detected in proofreading. Please refer to my supervising physician's documentation if my documentation differs.     Electronically Signed: Luana Leblanc DO, 05/02/22, 9:23 AM       Luana Leblanc DO  Resident  05/02/22 7422

## 2022-05-02 NOTE — Clinical Note
Bettina Contreras was seen and treated in our emergency department on 5/2/2022. She may return to work on 05/03/2022. If you have any questions or concerns, please don't hesitate to call.       Maude Kenyon, DO

## 2022-05-31 ENCOUNTER — HOSPITAL ENCOUNTER (EMERGENCY)
Age: 42
Discharge: HOME | End: 2022-05-31
Payer: COMMERCIAL

## 2022-05-31 VITALS
OXYGEN SATURATION: 98 % | SYSTOLIC BLOOD PRESSURE: 120 MMHG | RESPIRATION RATE: 16 BRPM | DIASTOLIC BLOOD PRESSURE: 80 MMHG | TEMPERATURE: 98.3 F | HEART RATE: 66 BPM

## 2022-05-31 DIAGNOSIS — N92.0 MENORRHAGIA WITH REGULAR CYCLE: Primary | ICD-10-CM

## 2022-05-31 DIAGNOSIS — R63.5 WEIGHT GAIN: ICD-10-CM

## 2022-05-31 DIAGNOSIS — E03.9 HYPOTHYROIDISM, UNSPECIFIED TYPE: ICD-10-CM

## 2022-05-31 LAB
BASOPHILS # BLD: 1 %
BASOPHILS ABSOLUTE: 0.1 THOU/MM3 (ref 0–0.1)
BILIRUBIN URINE: NEGATIVE
BLOOD, URINE: ABNORMAL
CHARACTER, URINE: CLEAR
COLOR: YELLOW
EOSINOPHIL # BLD: 2.2 %
EOSINOPHILS ABSOLUTE: 0.1 THOU/MM3 (ref 0–0.4)
GLUCOSE URINE: NEGATIVE MG/DL
HCT VFR BLD CALC: 42.6 % (ref 37–47)
HEMOGLOBIN: 14.4 GM/DL (ref 12–16)
IMMATURE GRANS (ABS): 0.01 THOU/MM3 (ref 0–0.07)
IMMATURE GRANULOCYTES: 0.2 %
KETONES, URINE: NEGATIVE
LEUKOCYTE ESTERASE, URINE: NEGATIVE
LYMPHOCYTES # BLD: 41.5 %
LYMPHOCYTES ABSOLUTE: 2.4 THOU/MM3 (ref 1–4.8)
MCH RBC QN AUTO: 31 PG (ref 27–31)
MCHC RBC AUTO-ENTMCNC: 33.8 GM/DL (ref 33–37)
MCV RBC AUTO: 92 FL (ref 81–99)
MONOCYTES # BLD: 6 %
MONOCYTES ABSOLUTE: 0.4 THOU/MM3 (ref 0.4–1.3)
NITRITE, URINE: NEGATIVE
NUCLEATED RED BLOOD CELLS: 0 /100 WBC
PDW BLD-RTO: 13.3 % (ref 11.5–14.5)
PH, URINE: 5.5 (ref 5–9)
PLATELET # BLD: 303 THOU/MM3 (ref 130–400)
PMV BLD AUTO: 9.7 FL (ref 7.4–10.4)
PROTEIN, URINE: NEGATIVE MG/DL
RBC # BLD: 4.65 MILL/MM3 (ref 4.2–5.4)
SEG NEUTROPHILS: 49.1 %
SEGMENTED NEUTROPHILS ABSOLUTE COUNT: 2.9 THOU/MM3 (ref 1.8–7.7)
SPECIFIC GRAVITY, URINE: 1.03 (ref 1–1.03)
T4 FREE: 1.11 NG/DL (ref 0.93–1.76)
TSH SERPL DL<=0.05 MIU/L-ACNC: 1.74 UIU/ML (ref 0.4–4.2)
UROBILINOGEN, URINE: 0.2 EU/DL (ref 0.2–1)
WBC # BLD: 5.9 THOU/MM3 (ref 4.8–10.8)

## 2022-05-31 PROCEDURE — 6360000002 HC RX W HCPCS: Performed by: NURSE PRACTITIONER

## 2022-05-31 PROCEDURE — 84443 ASSAY THYROID STIM HORMONE: CPT

## 2022-05-31 PROCEDURE — 85025 COMPLETE CBC W/AUTO DIFF WBC: CPT

## 2022-05-31 PROCEDURE — 84439 ASSAY OF FREE THYROXINE: CPT

## 2022-05-31 PROCEDURE — 36415 COLL VENOUS BLD VENIPUNCTURE: CPT

## 2022-05-31 PROCEDURE — 81003 URINALYSIS AUTO W/O SCOPE: CPT

## 2022-05-31 PROCEDURE — 99213 OFFICE O/P EST LOW 20 MIN: CPT | Performed by: NURSE PRACTITIONER

## 2022-05-31 PROCEDURE — 96372 THER/PROPH/DIAG INJ SC/IM: CPT

## 2022-05-31 PROCEDURE — 84480 ASSAY TRIIODOTHYRONINE (T3): CPT

## 2022-05-31 PROCEDURE — 99214 OFFICE O/P EST MOD 30 MIN: CPT

## 2022-05-31 RX ORDER — KETOROLAC TROMETHAMINE 30 MG/ML
60 INJECTION, SOLUTION INTRAMUSCULAR; INTRAVENOUS ONCE
Status: COMPLETED | OUTPATIENT
Start: 2022-05-31 | End: 2022-05-31

## 2022-05-31 RX ORDER — TOPIRAMATE 50 MG/1
TABLET, FILM COATED ORAL
COMMUNITY
Start: 2022-03-31

## 2022-05-31 RX ADMIN — KETOROLAC TROMETHAMINE 60 MG: 30 INJECTION, SOLUTION INTRAMUSCULAR at 10:50

## 2022-05-31 ASSESSMENT — ENCOUNTER SYMPTOMS
APNEA: 0
SHORTNESS OF BREATH: 0
STRIDOR: 0
CONSTIPATION: 0
CHOKING: 0
ABDOMINAL PAIN: 1
CHEST TIGHTNESS: 0
DIARRHEA: 0
NAUSEA: 0
VOMITING: 0
BACK PAIN: 0
WHEEZING: 0
COUGH: 0

## 2022-05-31 NOTE — ED TRIAGE NOTES
Teena Vidal arrives to room with complaint of vaginal bleeding ab cramping weakness symptoms started 3 weeks ago.

## 2022-05-31 NOTE — ED PROVIDER NOTES
[facility]      CHIEF COMPLAINT       Chief Complaint   Patient presents with    Vaginal Bleeding    Abdominal Cramping    Fatigue       Nurses Notes reviewed and I agree except as noted in the HPI. HISTORY OFPRESENT ILLNESS   Ra Home is a 36 y.o. The history is provided by the patient. No  was used. Vaginal Bleeding  Quality:  Bright red and typical of menses  Severity:  Moderate  Onset quality:  Gradual  Duration:  3 weeks  Timing:  Constant  Progression:  Unchanged  Chronicity:  New  Menstrual history:  Regular  Number of pads used:  2  Number of tampons used:  0  Possible pregnancy: no    Context: not after intercourse, not after urination, not at rest, not during intercourse, not during urination, not foreign body, not genital trauma and not spontaneously    Relieved by:  Nothing  Ineffective treatments:  None tried  Associated symptoms: abdominal pain    Associated symptoms: no back pain, no dizziness, no dyspareunia, no dysuria, no fatigue, no fever, no nausea and no vaginal discharge    Risk factors: no bleeding disorder, no hx of ectopic pregnancy, no hx of endometriosis, no gynecological surgery, does not have multiple partners, no new sexual partner, no ovarian cysts, no ovarian torsion, no PID, no prior miscarriage, no STD, no STD exposure, no terminated pregnancies and does not have unprotected sex    Risk factors comment:  Nexplinan inplant left upper arm, in over a year      REVIEW OF SYSTEMS     Review of Systems   Constitutional: Positive for unexpected weight change. Negative for activity change, appetite change, chills, diaphoresis, fatigue and fever. Respiratory: Negative for apnea, cough, choking, chest tightness, shortness of breath, wheezing and stridor. Cardiovascular: Negative for chest pain, palpitations and leg swelling. Gastrointestinal: Positive for abdominal pain.  Negative for constipation, XL) 300 MG extended release tablet Take 1 tablet by mouth every morning, Disp-30 tablet, R-5Normal      ibuprofen (ADVIL;MOTRIN) 200 MG tablet Take 200 mg by mouth every 6 hours as needed for PainHistorical Med      fluticasone (FLONASE) 50 MCG/ACT nasal spray 1 spray by Each Nare route dailyHistorical Med      acetaminophen (APAP EXTRA STRENGTH) 500 MG tablet Take 1 tablet by mouth every 6 hours as needed for Pain, Disp-120 tablet, R-3Print             ALLERGIES     Patient is is allergic to bee pollen. FAMILY HISTORY     Patient's family history includes Cancer in her sister; Diabetes in her father and mother; Heart Disease in her father; High Blood Pressure in her mother; Thyroid Disease in her mother. SOCIAL HISTORY     Patient  reports that she has been smoking cigarettes. She started smoking about 24 years ago. She has a 22.00 pack-year smoking history. She has never used smokeless tobacco. She reports previous alcohol use. She reports that she does not use drugs. PHYSICAL EXAM     ED TRIAGE VITALS  BP: 120/80, Temp: 98.3 °F (36.8 °C), Heart Rate: 66, Resp: 16, SpO2: 98 %  Physical Exam  Vitals and nursing note reviewed. Constitutional:       General: She is not in acute distress. Appearance: Normal appearance. She is obese. She is not ill-appearing, toxic-appearing or diaphoretic. HENT:      Head: Atraumatic. Right Ear: External ear normal.      Left Ear: External ear normal.   Eyes:      Extraocular Movements: Extraocular movements intact. Conjunctiva/sclera: Conjunctivae normal.   Pulmonary:      Effort: Pulmonary effort is normal.   Abdominal:      General: There is no distension. Palpations: There is no mass. Tenderness: There is abdominal tenderness in the periumbilical area and suprapubic area. There is no guarding or rebound. Hernia: No hernia is present. Musculoskeletal:      Cervical back: Normal range of motion.    Neurological:      General: No focal deficit present. Mental Status: She is alert and oriented to person, place, and time. Psychiatric:         Mood and Affect: Mood normal.         Behavior: Behavior normal.         Thought Content:  Thought content normal.         Judgment: Judgment normal.         DIAGNOSTIC RESULTS   Labs:  Results for orders placed or performed during the hospital encounter of 05/31/22   CBC with Auto Differential   Result Value Ref Range    WBC 5.9 4.8 - 10.8 thou/mm3    RBC 4.65 4.20 - 5.40 mill/mm3    Hemoglobin 14.4 12.0 - 16.0 gm/dl    Hematocrit 42.6 37.0 - 47.0 %    MCV 92 81 - 99 fL    MCH 31.0 27.0 - 31.0 pg    MCHC 33.8 33.0 - 37.0 gm/dl    RDW 13.3 11.5 - 14.5 %    Platelets 620 779 - 501 thou/mm3    MPV 9.7 7.4 - 10.4 fL   Differential   Result Value Ref Range    Seg Neutrophils 49.1 %    Lymphocytes 41.5 %    Monocytes 6.0 %    Eosinophils 2.2 %    Basophils 1.0 %    Immature Granulocytes 0.2 %    Segs Absolute 2.9 1.8 - 7.7 thou/mm3    Lymphocytes Absolute 2.4 1.0 - 4.8 thou/mm3    Monocytes Absolute 0.4 0.4 - 1.3 thou/mm3    Eosinophils Absolute 0.1 0.0 - 0.4 thou/mm3    Basophils Absolute 0.1 0.0 - 0.1 thou/mm3    Immature Grans (Abs) 0.01 0.00 - 0.07 thou/mm3    nRBC 0 /100 wbc   TSH   Result Value Ref Range    TSH 1.740 0.400 - 4.200 uIU/mL   T3   Result Value Ref Range    T3, Total 84 60 - 181 ng/dL   T4, Free   Result Value Ref Range    T4 Free 1.11 0.93 - 1.76 ng/dL   Urinalysis   Result Value Ref Range    Glucose, Ur NEGATIVE NEGATIVE mg/dl    Bilirubin Urine NEGATIVE NEGATIVE    Ketones, Urine NEGATIVE NEGATIVE    Specific Gravity, Urine 1.030 1.002 - 1.030    Blood, Urine LARGE (A) NEGATIVE    pH, Urine 5.5 5.0 - 9.0    Protein, Urine NEGATIVE NEGATIVE mg/dl    Urobilinogen, Urine 0.2 0.2 - 1.0 eu/dl    Nitrite, Urine NEGATIVE NEGATIVE    Leukocyte Esterase, Urine NEGATIVE NEGATIVE    Color, UA YELLOW STRAW-YELLOW    Character, Urine CLEAR CLEAR-SL CLOUD       IMAGING:  No orders to display     URGENT CARE COURSE:     Vitals:    05/31/22 1029 05/31/22 1127   BP: 135/86 120/80   Pulse: 66 66   Resp: 16 16   Temp: 98.3 °F (36.8 °C)    TempSrc: Temporal    SpO2: 98%        Medications   ketorolac (TORADOL) injection 60 mg (60 mg IntraMUSCular Given 5/31/22 1050)     PROCEDURES:  None  FINAL IMPRESSION      1. Menorrhagia with regular cycle    2. Hypothyroidism, unspecified type    3. Weight gain        DISPOSITION/PLAN      Discussed physical findings and vital signs with the patient who can be discharged and managed conservatively at home. At the present time Brigid Echevarria is alert and well hydrated, not ill or toxic appearing. The parent is advised to drink lots of fluids, water specifically,  monitor urine output, continue with Tylenol for any fever management of comfort if needed. I also mentioned that any changes such as fever not relieved with Tylenol, decreased urine output, development of abdominal pain or fever, or any other concerns they are to go to the emergency department for reevaluation and further management. Follow-up with their primary care provider in the next 2-3 days for reevaluation. They are agreeable to the treatment plan at this time and the patient left in no acute distress and stable condition.         PATIENT REFERRED TO:  Dana Bach DO  [address] [facility] [address]  [phone]    Schedule an appointment as soon as possible for a visit       SHANA Carrillo  CNP  [address] [place] [address] [address] [place] [address],[address] [facility]  [phone]    Schedule an appointment as soon as possible for a visit       DISCHARGE MEDICATIONS:  Discharge Medication List as of 5/31/2022 11:23 AM        Discharge Medication List as of 5/31/2022 11:23 AM          Eagle Barrera, APRN - CNP          Eagle Barrera, SHANA [facility]  05/31/22 [address] [facility], SHANA [facility]  06/01/22 [address], APRN - CNP  06/02/22 [id number]

## 2022-05-31 NOTE — Clinical Note
Harrison Wynne was seen and treated in our emergency department on 5/31/2022. She may return to work on 06/01/2022. If you have any questions or concerns, please don't hesitate to call.       Carrillo Blunt, SHANA - CNP

## 2022-06-01 ENCOUNTER — APPOINTMENT (OUTPATIENT)
Dept: ULTRASOUND IMAGING | Age: 42
End: 2022-06-01
Payer: COMMERCIAL

## 2022-06-01 ENCOUNTER — HOSPITAL ENCOUNTER (EMERGENCY)
Age: 42
Discharge: HOME | End: 2022-06-01
Attending: EMERGENCY MEDICINE
Payer: COMMERCIAL

## 2022-06-01 VITALS
TEMPERATURE: 98.9 F | SYSTOLIC BLOOD PRESSURE: 127 MMHG | RESPIRATION RATE: 16 BRPM | DIASTOLIC BLOOD PRESSURE: 96 MMHG | HEART RATE: 57 BPM | HEIGHT: 66 IN | BODY MASS INDEX: 34.55 KG/M2 | OXYGEN SATURATION: 99 % | WEIGHT: 215 LBS

## 2022-06-01 DIAGNOSIS — R10.2 PELVIC CRAMPING: ICD-10-CM

## 2022-06-01 DIAGNOSIS — N93.8 DYSFUNCTIONAL UTERINE BLEEDING: Primary | ICD-10-CM

## 2022-06-01 LAB
ANION GAP SERPL CALCULATED.3IONS-SCNC: 10 MEQ/L (ref 8–16)
BASOPHILS # BLD: 0.7 %
BASOPHILS ABSOLUTE: 0.1 THOU/MM3 (ref 0–0.1)
BUN BLDV-MCNC: 13 MG/DL (ref 7–22)
CALCIUM SERPL-MCNC: 8.6 MG/DL (ref 8.5–10.5)
CHLORIDE BLD-SCNC: 105 MEQ/L (ref 98–111)
CO2: 23 MEQ/L (ref 23–33)
CREAT SERPL-MCNC: 0.8 MG/DL (ref 0.4–1.2)
EOSINOPHIL # BLD: 1.9 %
EOSINOPHILS ABSOLUTE: 0.1 THOU/MM3 (ref 0–0.4)
ERYTHROCYTE [DISTWIDTH] IN BLOOD BY AUTOMATED COUNT: 12.7 % (ref 11.5–14.5)
ERYTHROCYTE [DISTWIDTH] IN BLOOD BY AUTOMATED COUNT: 43.5 FL (ref 35–45)
GFR SERPL CREATININE-BSD FRML MDRD: 78 ML/MIN/1.73M2
GLUCOSE BLD-MCNC: 87 MG/DL (ref 70–108)
HCT VFR BLD CALC: 37.5 % (ref 37–47)
HEMOGLOBIN: 12.5 GM/DL (ref 12–16)
IMMATURE GRANS (ABS): 0.01 THOU/MM3 (ref 0–0.07)
IMMATURE GRANULOCYTES: 0.1 %
LYMPHOCYTES # BLD: 36.1 %
LYMPHOCYTES ABSOLUTE: 2.6 THOU/MM3 (ref 1–4.8)
MCH RBC QN AUTO: 30.9 PG (ref 26–33)
MCHC RBC AUTO-ENTMCNC: 33.3 GM/DL (ref 32.2–35.5)
MCV RBC AUTO: 92.8 FL (ref 81–99)
MONOCYTES # BLD: 6.6 %
MONOCYTES ABSOLUTE: 0.5 THOU/MM3 (ref 0.4–1.3)
NUCLEATED RED BLOOD CELLS: 0 /100 WBC
OSMOLALITY CALCULATION: 275.2 MOSMOL/KG (ref 275–300)
PLATELET # BLD: 271 THOU/MM3 (ref 130–400)
PMV BLD AUTO: 9.4 FL (ref 9.4–12.4)
POTASSIUM SERPL-SCNC: 4.2 MEQ/L (ref 3.5–5.2)
PREGNANCY, SERUM: NEGATIVE
RBC # BLD: 4.04 MILL/MM3 (ref 4.2–5.4)
SEG NEUTROPHILS: 54.6 %
SEGMENTED NEUTROPHILS ABSOLUTE COUNT: 3.9 THOU/MM3 (ref 1.8–7.7)
SODIUM BLD-SCNC: 138 MEQ/L (ref 135–145)
T3 TOTAL: 84 NG/DL (ref 60–181)
WBC # BLD: 7.2 THOU/MM3 (ref 4.8–10.8)

## 2022-06-01 PROCEDURE — 84703 CHORIONIC GONADOTROPIN ASSAY: CPT

## 2022-06-01 PROCEDURE — 85025 COMPLETE CBC W/AUTO DIFF WBC: CPT

## 2022-06-01 PROCEDURE — 96374 THER/PROPH/DIAG INJ IV PUSH: CPT

## 2022-06-01 PROCEDURE — 80048 BASIC METABOLIC PNL TOTAL CA: CPT

## 2022-06-01 PROCEDURE — 76830 TRANSVAGINAL US NON-OB: CPT

## 2022-06-01 PROCEDURE — 6360000002 HC RX W HCPCS: Performed by: EMERGENCY MEDICINE

## 2022-06-01 PROCEDURE — 99284 EMERGENCY DEPT VISIT MOD MDM: CPT

## 2022-06-01 RX ORDER — KETOROLAC TROMETHAMINE 10 MG/1
10 TABLET, FILM COATED ORAL EVERY 6 HOURS PRN
Qty: 12 TABLET | Refills: 0 | Status: SHIPPED | OUTPATIENT
Start: 2022-06-01 | End: 2022-07-13 | Stop reason: ALTCHOICE

## 2022-06-01 RX ORDER — KETOROLAC TROMETHAMINE 30 MG/ML
30 INJECTION, SOLUTION INTRAMUSCULAR; INTRAVENOUS ONCE
Status: COMPLETED | OUTPATIENT
Start: 2022-06-01 | End: 2022-06-01

## 2022-06-01 RX ADMIN — KETOROLAC TROMETHAMINE 30 MG: 30 INJECTION, SOLUTION INTRAMUSCULAR; INTRAVENOUS at 22:15

## 2022-06-01 ASSESSMENT — PAIN - FUNCTIONAL ASSESSMENT
PAIN_FUNCTIONAL_ASSESSMENT: 0-10
PAIN_FUNCTIONAL_ASSESSMENT: 0-10

## 2022-06-01 ASSESSMENT — PAIN SCALES - GENERAL
PAINLEVEL_OUTOF10: 10
PAINLEVEL_OUTOF10: 10

## 2022-06-01 ASSESSMENT — PAIN DESCRIPTION - ORIENTATION: ORIENTATION: LOWER

## 2022-06-01 ASSESSMENT — PAIN DESCRIPTION - LOCATION: LOCATION: ABDOMEN

## 2022-06-02 ASSESSMENT — ENCOUNTER SYMPTOMS
SHORTNESS OF BREATH: 0
COUGH: 0
VOMITING: 0
COLOR CHANGE: 0
ABDOMINAL PAIN: 0

## 2022-06-02 NOTE — ED NOTES
Pt medicated per MAR. No needs expressed at this time.  [facility]     [facility]  06/01/22 6439

## 2022-06-02 NOTE — ED PROVIDER NOTES
[facility]    EMERGENCY MEDICINE     Pt Name: Rikki Myers  MRN: [id number]  [place] 1980  Date of evaluation: 6/1/2022  Provider: Mitch Donaldson MD  CHIEF COMPLAINT       Chief Complaint   Patient presents with    Vaginal Bleeding    Abdominal Pain       HISTORY OF PRESENT ILLNESS    Rikki Myers is a pleasant 36 y.o. female   Presents to the emergency department from home for pelvic cramping and 3 weeks of vaginal bleeding. She reports she went to urgent care yesterday who didn't do anything. She reports she goes through about 2-3 pads per day, so the bleeding is light to medium. She has an appointment with her gynecologist next week. She denies any fever or discharge, denies any passing out, denies any vomiting. No other complaints, no other known exacerbating/relieving factors. Triage notes and Nursing notes were reviewed by myself. Any discrepancies are addressed above.     PAST MEDICAL HISTORY     Past Medical History:   Diagnosis Date    Abdominal pain, right upper quadrant     Acute cystitis 11/5/2016    Allergic rhinitis     Anxiety     CAD (coronary artery disease)     Depression (emotion)     DUB (dysfunctional uterine bleeding)     Generalized anxiety disorder     GERD (gastroesophageal reflux disease)     Hypertriglyceridemia 11/11/2014    Hypoglycemia     Hypothyroid 8/10/2014    Movement disorder     Pancreatitis 11/10/14    Psychiatric problem     Scoliosis     Tobacco abuse        SURGICAL HISTORY       Past Surgical History:   Procedure Laterality Date    COLONOSCOPY N/A 7/27/2019    COLONOSCOPY WITH BIOPSY performed by Brian Kelly MD at [address] Right 08/2017    KNEE SURGERY Right 02/11/2020       CURRENT MEDICATIONS       Discharge Medication List as of 6/1/2022 11:34 PM      CONTINUE these medications which have NOT CHANGED    Details   topiramate (TOPAMAX) 50 mg tablet TAKE 1 TABLET BY MOUTH AT [facility] Med      levothyroxine (EUTHYROX) 50 MCG tablet Take 1 tablet by mouth once daily, Disp-90 tablet, R-0Normal      traZODone (DESYREL) 100 MG tablet Historical Med      venlafaxine (EFFEXOR XR) 37.5 MG extended release capsule Take one capsule (37.5mg) each morning for 4 days, then increase to two capsules (75mg) on day five., Disp-56 capsule, R-0Normal      ARIPiprazole (ABILIFY) 10 MG tablet Take 1 tablet by mouth once daily, Disp-30 tablet, R-5Normal      SYMBICORT 160-4.5 MCG/ACT AERO Inhale 2 puffs into the lungs 2 times daily, Disp-11 g, R-5, DAWNormal      buPROPion (WELLBUTRIN XL) 300 MG extended release tablet Take 1 tablet by mouth every morning, Disp-30 tablet, R-5Normal      fluticasone (FLONASE) 50 MCG/ACT nasal spray 1 spray by Each Nare route dailyHistorical Med      acetaminophen (APAP EXTRA STRENGTH) 500 MG tablet Take 1 tablet by mouth every 6 hours as needed for Pain, Disp-120 tablet, R-3Print             ALLERGIES     Bee pollen    FAMILY HISTORY       Family History   Problem Relation Age of Onset    Diabetes Mother     High Blood Pressure Mother     Thyroid Disease Mother     Heart Disease Father     Diabetes Father     Cancer Sister         cervical        SOCIAL HISTORY       Social History     Socioeconomic History    Marital status:      Spouse name: None    Number of children: None    Years of education: None    Highest education level: None   Occupational History    Occupation: Caregiver     Employer: [employer]   Tobacco Use    Smoking status: Current Some Day Smoker     Packs/day: 1.00     Years: 22.00     Pack years: 22.00     Types: Cigarettes     Start date: 1/16/1998    Smokeless tobacco: Never Used   Vaping Use    Vaping Use: Never used   Substance and Sexual Activity    Alcohol use: Not Currently     Comment: social drinking    Drug use: No    Sexual activity: Yes     Partners: Male   Other Topics Concern    None Social History Narrative    None     Social Determinants of Health     Financial Resource Strain: Low Risk     Difficulty of Paying Living Expenses: Not hard at all   Food Insecurity: No Food Insecurity    Worried About Running Out of Food in the Last Year: Never true    Jorge of Food in the Last Year: Never true   Transportation Needs:     Lack of Transportation (Medical): Not on file    Lack of Transportation (Non-Medical): Not on file   Physical Activity:     Days of Exercise per Week: Not on file    Minutes of Exercise per Session: Not on file   Stress:     Feeling of Stress : Not on file   Social Connections:     Frequency of Communication with Friends and Family: Not on file    Frequency of Social Gatherings with Friends and Family: Not on file    Attends [employer] Services: Not on file    Active Member of [address] [place] [address] [employer] or Organizations: Not on file    Attends Club or Organization Meetings: Not on file    Marital Status: Not on file   Intimate Partner Violence:     Fear of Current or Ex-Partner: Not on file    Emotionally Abused: Not on file    Physically Abused: Not on file    Sexually Abused: Not on file   Housing Stability:     Unable to Pay for Housing in the Last Year: Not on file    Number of [place] in the Last Year: Not on file    Unstable Housing in the Last Year: Not on file       REVIEW OF SYSTEMS     Review of Systems   Constitutional: Negative for chills and fever. Respiratory: Negative for cough and shortness of breath. Cardiovascular: Negative for chest pain and leg swelling. Gastrointestinal: Negative for abdominal pain and vomiting. Skin: Negative for color change and rash. All other systems reviewed and are negative. Except as noted above the remainder of the review of systems was reviewed and is.    PHYSICAL EXAM    (up to 7 for level 4, 8 or more for level 5)     ED Triage Vitals [06/01/22 2056]   BP Temp Temp Source Heart Rate Resp SpO2 Height Weight 138/74 98.9 °F (37.2 °C) Oral 77 18 97 % 5' 6\" (1.676 m) 215 lb (97.5 kg)       Physical Exam  Vitals and nursing note reviewed. HENT:      Head: Normocephalic and atraumatic. Nose: Nose normal.      Mouth/Throat:      Lips: Pink. Mouth: Mucous membranes are moist.   Eyes:      General: Lids are normal.      Conjunctiva/sclera: Conjunctivae normal.   Neck:      Vascular: No JVD. Cardiovascular:      Rate and Rhythm: Normal rate and regular rhythm. Heart sounds: No murmur heard. No friction rub. No gallop. Pulmonary:      Effort: Pulmonary effort is normal.      Breath sounds: Normal breath sounds and air entry. No wheezing, rhonchi or rales. Abdominal:      Palpations: Abdomen is soft. Tenderness: There is abdominal tenderness in the suprapubic area. There is no right CVA tenderness, left CVA tenderness, guarding or rebound. Genitourinary:     Comments: Patient declines exam  Musculoskeletal:      Cervical back: Neck supple. Skin:     General: Skin is warm and dry. Findings: No rash. Neurological:      Mental Status: She is alert. GCS: GCS eye subscore is 4. GCS verbal subscore is 5. GCS motor subscore is 6. Sensory: Sensation is intact. Motor: Motor function is intact. Psychiatric:         Behavior: Behavior is cooperative. DIAGNOSTIC RESULTS     EKG:(none if blank)  All EKG's are interpreted by [facility] Physician who either signs or Co-signs this chart in the absence of a cardiologist.        RADIOLOGY: (none if blank)   Interpretation per the Radiologistbelow, if available at the time of this note:    US NON OB TRANSVAGINAL   Final Result   Impression:   Subcentimeter anechoic avascular lesions in the lower uterine    segment/upper cervix are likely nabothian cysts. Cystic endometrial    hyperplasia may also be considered. Nonemergent referral to gynecology is    suggested. The right ovary was not visualized.    Left ovarian cyst measuring 3.0 cm. No follow-up is required. This document has been electronically signed by: Raheel Yao. Etelvina Rodgers MD    on 06/01/2022 10:45 PM          LABS:  Labs Reviewed   CBC WITH AUTO DIFFERENTIAL - Abnormal; Notable for the following components:       Result Value    RBC 4.04 (*)     All other components within normal limits   GLOMERULAR FILTRATION RATE, ESTIMATED - Abnormal; Notable for the following components:    Est, Glom Filt Rate 78 (*)     All other components within normal limits   BASIC METABOLIC PANEL   HCG, SERUM, QUALITATIVE   ANION GAP   OSMOLALITY       All other labs were within normal range or not returned as of this dictation. Please note, any cultures that may have been sent were not resulted at the time of this patient visit. EMERGENCY DEPARTMENT COURSE andMedical Decision Making:     MDM/   Pt presents to the ER with 3 weeks of vaginal bleeding, hg normal, abd soft without peritoneal signs, vss, patient declines recommended pelvic exam.  I reviewed US findings with patient, discussed need to f/u with her gyn and review with them, she verbalizes understanding and agreement. Pt also counseled regarding importance of f/u and ER return precautions. Strict returnprecautions and follow up instructions were discussed with the patient with which the patient agrees      ED Medications administered this visit:    Medications   ketorolac (TORADOL) injection 30 mg (30 mg IntraVENous Given 6/1/22 2215)         Procedures: (None if blank)         CLINICAL IMPRESSION     1. Dysfunctional uterine bleeding    2.  Pelvic cramping          DISPOSITION/PLAN   DISPOSITION Decision To Discharge 06/01/2022 11:30:19 PM      PATIENT REFERRED TO:  Your gynecologist    In 2 days        DISCHARGE MEDICATIONS:  Discharge Medication List as of 6/1/2022 11:34 PM      START taking these medications    Details   ketorolac (TORADOL) 10 MG tablet Take 1 tablet by mouth every 6 hours as needed for Pain, Disp-12 tablet, R-0Print                 (Please note that portions of this note were completed with a voice recognition program.  Efforts were made to edit the dictations but occasionallywords are mis-transcribed.)      Ralf Simpson MD,FACEP (electronically signed)  Attending Physician, Emergency Department        Livia Borja MD  06/02/22 0002

## 2022-06-02 NOTE — ED TRIAGE NOTES
Pt presents to the ER from home with c/o vaginal bleeding and lower abdominal pain. Pt states it has been going on for 3 weeks and has been moderate bleeding. Pt states she took a pregnancy test today and it was negative. Pt been taking ibuprofen with no relief. Pt is alert and oriented, respirations even and unlabored.  VSS

## 2022-07-13 PROBLEM — F17.210 TOBACCO DEPENDENCE DUE TO CIGARETTES: Status: RESOLVED | Noted: 2021-04-29 | Resolved: 2022-07-13

## 2022-07-13 PROBLEM — K52.9 COLITIS: Status: RESOLVED | Noted: 2019-07-26 | Resolved: 2022-07-13

## 2022-07-13 PROBLEM — F43.23 ADJUSTMENT DISORDER WITH MIXED ANXIETY AND DEPRESSED MOOD: Status: RESOLVED | Noted: 2021-04-29 | Resolved: 2022-07-13

## 2022-07-13 PROBLEM — R07.89 ATYPICAL CHEST PAIN: Status: RESOLVED | Noted: 2019-02-19 | Resolved: 2022-07-13

## 2022-07-13 PROBLEM — F32.A DEPRESSIVE DISORDER: Status: RESOLVED | Noted: 2019-03-27 | Resolved: 2022-07-13

## 2022-07-13 PROBLEM — K29.70 GASTRITIS: Status: RESOLVED | Noted: 2021-02-07 | Resolved: 2022-07-13

## 2022-07-13 PROBLEM — J34.89 RHINORRHEA: Status: RESOLVED | Noted: 2021-05-18 | Resolved: 2022-07-13

## 2022-07-13 PROBLEM — F32.A DEPRESSION: Status: RESOLVED | Noted: 2019-03-27 | Resolved: 2022-07-13

## 2022-07-13 PROBLEM — R06.00 DYSPNEA: Status: RESOLVED | Noted: 2019-02-19 | Resolved: 2022-07-13

## 2022-07-13 PROBLEM — J45.40 MODERATE PERSISTENT REACTIVE AIRWAY DISEASE WITHOUT COMPLICATION: Status: ACTIVE | Noted: 2022-07-13

## 2022-07-13 PROBLEM — F33.2 SEVERE EPISODE OF RECURRENT MAJOR DEPRESSIVE DISORDER, WITHOUT PSYCHOTIC FEATURES (HCC): Status: ACTIVE | Noted: 2022-07-13

## 2022-07-13 PROBLEM — J30.9 ALLERGIC RHINITIS: Status: RESOLVED | Noted: 2021-05-18 | Resolved: 2022-07-13

## 2022-07-13 RX ORDER — VENLAFAXINE HYDROCHLORIDE 150 MG/1
150 CAPSULE, EXTENDED RELEASE ORAL DAILY
COMMUNITY
Start: 2022-04-30

## 2022-07-13 RX ORDER — ARIPIPRAZOLE 15 MG/1
15 TABLET ORAL DAILY
COMMUNITY
Start: 2022-04-05

## 2022-07-13 NOTE — PROGRESS NOTES
Ru Alegria (:  1980) is a Established patient, here for evaluation of the following:    Assessment & Plan   Below is the assessment and plan developed based on review of pertinent history, physical exam, labs, studies, and medications. 1. Hypothyroidism, unspecified type  -     levothyroxine (EUTHYROX) 50 MCG tablet; Take 1 tablet by mouth once daily, Disp-90 tablet, R-0Normal  -     TSH; Future  -     T4, Free; Future  2. Severe episode of recurrent major depressive disorder, without psychotic features ([facility] [address]  -     [place] DEPRESSION SCREEN ANNUAL  3. Moderate persistent reactive airway disease without complication  4. Gastroesophageal reflux disease without esophagitis  -     famotidine (PEPCID) 40 MG tablet; Take 1 tablet by mouth every evening, Disp-90 tablet, R-1Normal  -     H. Pylori Antigen, Stool; Future    Can consider stopping thyroid medication, but will have to do slow taper. So will refill old dosage for now due to reemergence of symptoms without it. Will recheck labs in 2 months. On many psych medications, consider gene-sight. Continue seeing therapist and psychologist.    Will begin pepcid for GERD, order h pylori test.    For weight, discussed calorie countie. To bring in food log in 1 month, can consider weight loss medications at future visit. Return in about 6 weeks (around 2022). Subjective   HPI     GERD  Not taking anything for this, denied red flag symptoms. Obesity  Is not calorie counting currently, discussed. Vaginal Bleeding  Seeing Dr. Papito Wilson. No current questions. Hypothyroid   Has had worsening fatigue, constipation, and cold intolerane since self discontinuing thyroid medications. Requests refill.   Unclear cause for this but has had abnormal thyroid levels in past    [address] psychologist.  No SI.  Current w/ therapist.      Review of Systems       Objective   Patient-Reported Vitals  No data recorded     Physical verified, and a caregiver was present when appropriate. The patient was located at Home: [address]  [address] [address]. Provider was located at [facility] ([address] [place] [address]): [address] [place] [address] [facility] R[facility] .ALEJANDRA,  [address].         --Jerilyn To MD

## 2022-07-14 ENCOUNTER — TELEMEDICINE (OUTPATIENT)
Dept: FAMILY MEDICINE CLINIC | Age: 42
End: 2022-07-14
Payer: COMMERCIAL

## 2022-07-14 DIAGNOSIS — K21.9 GASTROESOPHAGEAL REFLUX DISEASE WITHOUT ESOPHAGITIS: ICD-10-CM

## 2022-07-14 DIAGNOSIS — F33.2 SEVERE EPISODE OF RECURRENT MAJOR DEPRESSIVE DISORDER, WITHOUT PSYCHOTIC FEATURES (HCC): ICD-10-CM

## 2022-07-14 DIAGNOSIS — E03.9 HYPOTHYROIDISM, UNSPECIFIED TYPE: Primary | ICD-10-CM

## 2022-07-14 DIAGNOSIS — J45.40 MODERATE PERSISTENT REACTIVE AIRWAY DISEASE WITHOUT COMPLICATION: ICD-10-CM

## 2022-07-14 PROCEDURE — G8427 DOCREV CUR MEDS BY ELIG CLIN: HCPCS | Performed by: STUDENT IN AN ORGANIZED HEALTH CARE EDUCATION/TRAINING PROGRAM

## 2022-07-14 PROCEDURE — 99214 OFFICE O/P EST MOD 30 MIN: CPT | Performed by: STUDENT IN AN ORGANIZED HEALTH CARE EDUCATION/TRAINING PROGRAM

## 2022-07-14 RX ORDER — LEVOTHYROXINE SODIUM 0.05 MG/1
TABLET ORAL
Qty: 90 TABLET | Refills: 0 | Status: SHIPPED | OUTPATIENT
Start: 2022-07-14

## 2022-07-14 RX ORDER — FAMOTIDINE 40 MG/1
40 TABLET, FILM COATED ORAL EVERY EVENING
Qty: 90 TABLET | Refills: 1 | Status: SHIPPED | OUTPATIENT
Start: 2022-07-14 | End: 2022-09-03

## 2022-07-14 NOTE — PROGRESS NOTES
[address] [facility] [id number]  Dept: [phone]  Loc: [phone]      Please see Resident note for complete HPI. ROS per Resident    Lab Results   Component Value Date    WBC 7.2 06/01/2022    HGB 12.5 06/01/2022    HCT 37.5 06/01/2022    MCV 92.8 06/01/2022     06/01/2022     Lab Results   Component Value Date     06/01/2022    K 4.2 06/01/2022     06/01/2022    CO2 23 06/01/2022    BUN 13 06/01/2022    CREATININE 0.8 06/01/2022    GLUCOSE 87 06/01/2022    CALCIUM 8.6 06/01/2022    PROT 7.2 11/23/2021    LABALBU 4.3 11/23/2021    BILITOT 0.3 11/23/2021    ALKPHOS 81 11/23/2021    AST 13 11/23/2021    ALT 9 (L) 11/23/2021    LABGLOM 78 (A) 06/01/2022     Lab Results   Component Value Date    LABA1C 5.4 08/29/2019     No results found for: EAG  No results found for: LABMICR, [id number]  Lab Results   Component Value Date    TSH 1.740 05/31/2022    P4AULKO 84 05/31/2022    T4FREE 1.11 05/31/2022     Lab Results   Component Value Date    CHOL 196 11/23/2021    CHOL 197 08/29/2019    CHOL 204 (H) 11/06/2016     Lab Results   Component Value Date    TRIG 129 11/23/2021    TRIG 189 08/29/2019    TRIG 507 (H) 01/27/2017     Lab Results   Component Value Date    HDL 50 11/23/2021    HDL 34 08/29/2019    HDL 29 11/06/2016     Lab Results   Component Value Date    LDLCALC 120 11/23/2021    LDLCALC 125 08/29/2019    LDLCALC 138 11/06/2016     No results found for: LABVLDL, VLDL  No results found for: CHOLHDLRATIO  No results found for: PSA, PSADIA    Health Maintenance Due   Topic Date Due    Pneumococcal 0-64 years Vaccine (1 - PCV) Never done    Depression Monitoring  Never done    COVID-19 Vaccine (2 - Booster for Fredo series) 05/20/2021         Physical Exam per Resident       ICD-10-CM    1. Hypothyroidism, unspecified type  E03.9 levothyroxine (EUTHYROX) 50 MCG tablet     TSH     T4, Free   2. Severe episode of recurrent major depressive disorder, without psychotic features ([facility] [address]  F33.2 [place] DEPRESSION SCREEN ANNUAL   3. Moderate persistent reactive airway disease without complication  B74.89    4. Gastroesophageal reflux disease without esophagitis  K21.9 famotidine (PEPCID) 40 MG tablet     H. Pylori Antigen, Stool           Plan  I participated in the discussion and care of this patient. Will refill levothyroxine now and recheck TSH, FT4, TT3 in 6 weeks. F/u with psych for medication management. For GERD will start pepcid and get H. Pylori testing. Recommended calorie counting for weight loss at this time. F/u per primary.

## 2022-07-14 NOTE — PROGRESS NOTES
Attending Physician Statement  I have discussed the case, including pertinent history and exam findings with the resident. I agree with the documented assessment and plan as documented by the resident.   GE modifier added to this encounter      Divya Edge MD 7/14/2022 2:08 PM

## 2022-08-11 NOTE — PROGRESS NOTES
EKG & patient history reviewed per Dr. Shona Verdugo. OK to proceed with surgery at [facility]- with Dr. Romero Wu under general anesthetic.

## 2022-08-18 ENCOUNTER — HOSPITAL ENCOUNTER (OUTPATIENT)
Age: 42
Setting detail: OUTPATIENT SURGERY
Discharge: HOME | End: 2022-08-18
Attending: OBSTETRICS & GYNECOLOGY | Admitting: OBSTETRICS & GYNECOLOGY
Payer: COMMERCIAL

## 2022-08-18 ENCOUNTER — ANESTHESIA (OUTPATIENT)
Dept: OPERATING ROOM | Age: 42
End: 2022-08-18
Payer: COMMERCIAL

## 2022-08-18 ENCOUNTER — ANESTHESIA EVENT (OUTPATIENT)
Dept: OPERATING ROOM | Age: 42
End: 2022-08-18
Payer: COMMERCIAL

## 2022-08-18 VITALS
RESPIRATION RATE: 15 BRPM | HEIGHT: 67 IN | WEIGHT: 236 LBS | HEART RATE: 68 BPM | DIASTOLIC BLOOD PRESSURE: 80 MMHG | BODY MASS INDEX: 37.04 KG/M2 | SYSTOLIC BLOOD PRESSURE: 113 MMHG | OXYGEN SATURATION: 90 % | TEMPERATURE: 96.6 F

## 2022-08-18 DIAGNOSIS — N92.0 MENORRHAGIA WITH REGULAR CYCLE: ICD-10-CM

## 2022-08-18 DIAGNOSIS — Z98.890 S/P LAPAROSCOPY: Primary | ICD-10-CM

## 2022-08-18 PROCEDURE — 3600000013 HC SURGERY LEVEL 3 ADDTL 15MIN: Performed by: OBSTETRICS & GYNECOLOGY

## 2022-08-18 PROCEDURE — 6360000002 HC RX W HCPCS

## 2022-08-18 PROCEDURE — 2709999900 HC NON-CHARGEABLE SUPPLY: Performed by: OBSTETRICS & GYNECOLOGY

## 2022-08-18 PROCEDURE — 2720000010 HC SURG SUPPLY STERILE: Performed by: OBSTETRICS & GYNECOLOGY

## 2022-08-18 PROCEDURE — 7100000011 HC PHASE II RECOVERY - ADDTL 15 MIN: Performed by: OBSTETRICS & GYNECOLOGY

## 2022-08-18 PROCEDURE — 6360000002 HC RX W HCPCS: Performed by: ANESTHESIOLOGY

## 2022-08-18 PROCEDURE — 88305 TISSUE EXAM BY PATHOLOGIST: CPT

## 2022-08-18 PROCEDURE — 7100000010 HC PHASE II RECOVERY - FIRST 15 MIN: Performed by: OBSTETRICS & GYNECOLOGY

## 2022-08-18 PROCEDURE — 2500000003 HC RX 250 WO HCPCS: Performed by: NURSE ANESTHETIST, CERTIFIED REGISTERED

## 2022-08-18 PROCEDURE — 7100000001 HC PACU RECOVERY - ADDTL 15 MIN: Performed by: OBSTETRICS & GYNECOLOGY

## 2022-08-18 PROCEDURE — 2580000003 HC RX 258: Performed by: OBSTETRICS & GYNECOLOGY

## 2022-08-18 PROCEDURE — 3700000001 HC ADD 15 MINUTES (ANESTHESIA): Performed by: OBSTETRICS & GYNECOLOGY

## 2022-08-18 PROCEDURE — 7100000000 HC PACU RECOVERY - FIRST 15 MIN: Performed by: OBSTETRICS & GYNECOLOGY

## 2022-08-18 PROCEDURE — 3700000000 HC ANESTHESIA ATTENDED CARE: Performed by: OBSTETRICS & GYNECOLOGY

## 2022-08-18 PROCEDURE — 6360000002 HC RX W HCPCS: Performed by: NURSE ANESTHETIST, CERTIFIED REGISTERED

## 2022-08-18 PROCEDURE — 6370000000 HC RX 637 (ALT 250 FOR IP): Performed by: OBSTETRICS & GYNECOLOGY

## 2022-08-18 PROCEDURE — 3600000003 HC SURGERY LEVEL 3 BASE: Performed by: OBSTETRICS & GYNECOLOGY

## 2022-08-18 PROCEDURE — 6370000000 HC RX 637 (ALT 250 FOR IP)

## 2022-08-18 RX ORDER — FENTANYL CITRATE 50 UG/ML
50 INJECTION, SOLUTION INTRAMUSCULAR; INTRAVENOUS EVERY 5 MIN PRN
Status: DISCONTINUED | OUTPATIENT
Start: 2022-08-18 | End: 2022-08-18 | Stop reason: HOSPADM

## 2022-08-18 RX ORDER — SODIUM CHLORIDE 0.9 % (FLUSH) 0.9 %
5-40 SYRINGE (ML) INJECTION PRN
Status: DISCONTINUED | OUTPATIENT
Start: 2022-08-18 | End: 2022-08-18 | Stop reason: HOSPADM

## 2022-08-18 RX ORDER — HYDROCODONE BITARTRATE AND ACETAMINOPHEN 5; 325 MG/1; MG/1
1 TABLET ORAL EVERY 4 HOURS PRN
Qty: 18 TABLET | Refills: 0 | Status: SHIPPED | OUTPATIENT
Start: 2022-08-18 | End: 2022-08-18 | Stop reason: HOSPADM

## 2022-08-18 RX ORDER — SODIUM CHLORIDE 0.9 % (FLUSH) 0.9 %
5-40 SYRINGE (ML) INJECTION EVERY 12 HOURS SCHEDULED
Status: DISCONTINUED | OUTPATIENT
Start: 2022-08-18 | End: 2022-08-18 | Stop reason: HOSPADM

## 2022-08-18 RX ORDER — SODIUM CHLORIDE 9 MG/ML
INJECTION, SOLUTION INTRAVENOUS PRN
Status: DISCONTINUED | OUTPATIENT
Start: 2022-08-18 | End: 2022-08-18 | Stop reason: HOSPADM

## 2022-08-18 RX ORDER — SODIUM CHLORIDE 9 MG/ML
INJECTION, SOLUTION INTRAVENOUS CONTINUOUS
Status: DISCONTINUED | OUTPATIENT
Start: 2022-08-18 | End: 2022-08-18 | Stop reason: HOSPADM

## 2022-08-18 RX ORDER — ONDANSETRON 4 MG/1
4 TABLET, ORALLY DISINTEGRATING ORAL EVERY 8 HOURS PRN
Status: DISCONTINUED | OUTPATIENT
Start: 2022-08-18 | End: 2022-08-18 | Stop reason: HOSPADM

## 2022-08-18 RX ORDER — ROCURONIUM BROMIDE 10 MG/ML
INJECTION, SOLUTION INTRAVENOUS PRN
Status: DISCONTINUED | OUTPATIENT
Start: 2022-08-18 | End: 2022-08-18 | Stop reason: SDUPTHER

## 2022-08-18 RX ORDER — MEPERIDINE HYDROCHLORIDE 25 MG/ML
12.5 INJECTION INTRAMUSCULAR; INTRAVENOUS; SUBCUTANEOUS EVERY 5 MIN PRN
Status: DISCONTINUED | OUTPATIENT
Start: 2022-08-18 | End: 2022-08-18 | Stop reason: HOSPADM

## 2022-08-18 RX ORDER — MIDAZOLAM HYDROCHLORIDE 1 MG/ML
INJECTION INTRAMUSCULAR; INTRAVENOUS PRN
Status: DISCONTINUED | OUTPATIENT
Start: 2022-08-18 | End: 2022-08-18 | Stop reason: SDUPTHER

## 2022-08-18 RX ORDER — FENTANYL CITRATE 50 UG/ML
INJECTION, SOLUTION INTRAMUSCULAR; INTRAVENOUS PRN
Status: DISCONTINUED | OUTPATIENT
Start: 2022-08-18 | End: 2022-08-18 | Stop reason: SDUPTHER

## 2022-08-18 RX ORDER — SCOLOPAMINE TRANSDERMAL SYSTEM 1 MG/1
1 PATCH, EXTENDED RELEASE TRANSDERMAL ONCE
Status: DISCONTINUED | OUTPATIENT
Start: 2022-08-18 | End: 2022-08-18 | Stop reason: HOSPADM

## 2022-08-18 RX ORDER — HYDROCODONE BITARTRATE AND ACETAMINOPHEN 5; 325 MG/1; MG/1
2 TABLET ORAL EVERY 4 HOURS PRN
Status: DISCONTINUED | OUTPATIENT
Start: 2022-08-18 | End: 2022-08-18 | Stop reason: HOSPADM

## 2022-08-18 RX ORDER — FENTANYL CITRATE 50 UG/ML
25 INJECTION, SOLUTION INTRAMUSCULAR; INTRAVENOUS EVERY 5 MIN PRN
Status: DISCONTINUED | OUTPATIENT
Start: 2022-08-18 | End: 2022-08-18 | Stop reason: HOSPADM

## 2022-08-18 RX ORDER — ONDANSETRON 2 MG/ML
4 INJECTION INTRAMUSCULAR; INTRAVENOUS
Status: DISCONTINUED | OUTPATIENT
Start: 2022-08-18 | End: 2022-08-18 | Stop reason: HOSPADM

## 2022-08-18 RX ORDER — LIDOCAINE HYDROCHLORIDE 20 MG/ML
INJECTION, SOLUTION EPIDURAL; INFILTRATION; INTRACAUDAL; PERINEURAL PRN
Status: DISCONTINUED | OUTPATIENT
Start: 2022-08-18 | End: 2022-08-18 | Stop reason: SDUPTHER

## 2022-08-18 RX ORDER — FENTANYL CITRATE 50 UG/ML
INJECTION, SOLUTION INTRAMUSCULAR; INTRAVENOUS
Status: COMPLETED
Start: 2022-08-18 | End: 2022-08-18

## 2022-08-18 RX ORDER — PROPOFOL 10 MG/ML
INJECTION, EMULSION INTRAVENOUS PRN
Status: DISCONTINUED | OUTPATIENT
Start: 2022-08-18 | End: 2022-08-18 | Stop reason: SDUPTHER

## 2022-08-18 RX ORDER — SUCCINYLCHOLINE CHLORIDE 20 MG/ML
INJECTION INTRAMUSCULAR; INTRAVENOUS PRN
Status: DISCONTINUED | OUTPATIENT
Start: 2022-08-18 | End: 2022-08-18 | Stop reason: SDUPTHER

## 2022-08-18 RX ORDER — DEXAMETHASONE SODIUM PHOSPHATE 10 MG/ML
INJECTION, EMULSION INTRAMUSCULAR; INTRAVENOUS PRN
Status: DISCONTINUED | OUTPATIENT
Start: 2022-08-18 | End: 2022-08-18 | Stop reason: SDUPTHER

## 2022-08-18 RX ORDER — HYDROCODONE BITARTRATE AND ACETAMINOPHEN 5; 325 MG/1; MG/1
1 TABLET ORAL EVERY 6 HOURS PRN
Qty: 18 TABLET | Refills: 0 | Status: SHIPPED | OUTPATIENT
Start: 2022-08-18 | End: 2022-08-23

## 2022-08-18 RX ORDER — SCOLOPAMINE TRANSDERMAL SYSTEM 1 MG/1
PATCH, EXTENDED RELEASE TRANSDERMAL
Status: DISCONTINUED
Start: 2022-08-18 | End: 2022-08-18 | Stop reason: HOSPADM

## 2022-08-18 RX ORDER — ONDANSETRON 2 MG/ML
4 INJECTION INTRAMUSCULAR; INTRAVENOUS EVERY 6 HOURS PRN
Status: DISCONTINUED | OUTPATIENT
Start: 2022-08-18 | End: 2022-08-18 | Stop reason: HOSPADM

## 2022-08-18 RX ORDER — IBUPROFEN 800 MG/1
800 TABLET ORAL EVERY 8 HOURS PRN
Status: DISCONTINUED | OUTPATIENT
Start: 2022-08-18 | End: 2022-08-18 | Stop reason: HOSPADM

## 2022-08-18 RX ORDER — ONDANSETRON 2 MG/ML
INJECTION INTRAMUSCULAR; INTRAVENOUS PRN
Status: DISCONTINUED | OUTPATIENT
Start: 2022-08-18 | End: 2022-08-18 | Stop reason: SDUPTHER

## 2022-08-18 RX ORDER — FENTANYL CITRATE 50 UG/ML
50 INJECTION, SOLUTION INTRAMUSCULAR; INTRAVENOUS ONCE
Status: COMPLETED | OUTPATIENT
Start: 2022-08-18 | End: 2022-08-18

## 2022-08-18 RX ORDER — HYDROCODONE BITARTRATE AND ACETAMINOPHEN 5; 325 MG/1; MG/1
1 TABLET ORAL EVERY 4 HOURS PRN
Status: DISCONTINUED | OUTPATIENT
Start: 2022-08-18 | End: 2022-08-18 | Stop reason: HOSPADM

## 2022-08-18 RX ORDER — KETOROLAC TROMETHAMINE 30 MG/ML
INJECTION, SOLUTION INTRAMUSCULAR; INTRAVENOUS PRN
Status: DISCONTINUED | OUTPATIENT
Start: 2022-08-18 | End: 2022-08-18 | Stop reason: SDUPTHER

## 2022-08-18 RX ADMIN — MIDAZOLAM 2 MG: 1 INJECTION INTRAMUSCULAR; INTRAVENOUS at 08:56

## 2022-08-18 RX ADMIN — PROPOFOL 150 MG: 10 INJECTION, EMULSION INTRAVENOUS at 08:58

## 2022-08-18 RX ADMIN — FENTANYL CITRATE 50 MCG: 50 INJECTION, SOLUTION INTRAMUSCULAR; INTRAVENOUS at 10:16

## 2022-08-18 RX ADMIN — HYDROCODONE BITARTRATE AND ACETAMINOPHEN 1 TABLET: 5; 325 TABLET ORAL at 10:49

## 2022-08-18 RX ADMIN — FENTANYL CITRATE 50 MCG: 50 INJECTION, SOLUTION INTRAMUSCULAR; INTRAVENOUS at 10:03

## 2022-08-18 RX ADMIN — LIDOCAINE HYDROCHLORIDE 80 MG: 20 INJECTION, SOLUTION EPIDURAL; INFILTRATION; INTRACAUDAL; PERINEURAL at 08:58

## 2022-08-18 RX ADMIN — ROCURONIUM BROMIDE 30 MG: 10 INJECTION, SOLUTION INTRAVENOUS at 09:09

## 2022-08-18 RX ADMIN — ONDANSETRON 4 MG: 2 INJECTION INTRAMUSCULAR; INTRAVENOUS at 09:32

## 2022-08-18 RX ADMIN — KETOROLAC TROMETHAMINE 30 MG: 30 INJECTION, SOLUTION INTRAMUSCULAR; INTRAVENOUS at 09:39

## 2022-08-18 RX ADMIN — FENTANYL CITRATE 100 MCG: 50 INJECTION, SOLUTION INTRAMUSCULAR; INTRAVENOUS at 08:58

## 2022-08-18 RX ADMIN — Medication 140 MG: at 08:58

## 2022-08-18 RX ADMIN — DEXAMETHASONE SODIUM PHOSPHATE 10 MG: 10 INJECTION, EMULSION INTRAMUSCULAR; INTRAVENOUS at 09:11

## 2022-08-18 RX ADMIN — PROPOFOL 50 MG: 10 INJECTION, EMULSION INTRAVENOUS at 09:02

## 2022-08-18 RX ADMIN — SUGAMMADEX 200 MG: 100 INJECTION, SOLUTION INTRAVENOUS at 09:47

## 2022-08-18 RX ADMIN — SODIUM CHLORIDE: 9 INJECTION, SOLUTION INTRAVENOUS at 08:55

## 2022-08-18 ASSESSMENT — PAIN SCALES - GENERAL
PAINLEVEL_OUTOF10: 6
PAINLEVEL_OUTOF10: 10

## 2022-08-18 ASSESSMENT — LIFESTYLE VARIABLES: SMOKING_STATUS: 1

## 2022-08-18 ASSESSMENT — PAIN DESCRIPTION - LOCATION: LOCATION: ABDOMEN

## 2022-08-18 ASSESSMENT — PAIN DESCRIPTION - DESCRIPTORS: DESCRIPTORS: ACHING;CRAMPING

## 2022-08-18 ASSESSMENT — PAIN DESCRIPTION - ORIENTATION: ORIENTATION: MID

## 2022-08-18 ASSESSMENT — PAIN - FUNCTIONAL ASSESSMENT: PAIN_FUNCTIONAL_ASSESSMENT: NONE - DENIES PAIN

## 2022-08-18 NOTE — DISCHARGE SUMMARY
GYN Surgery  Discharge Summary     Patient ID:  Hortensia Becerra  [id number]  39 y.o.  1980    Admit date: 8/18/2022    Admitting Physician: Diana Barger MD    Discharge Diagnoses: Menorrhagia with regular cycle [N92.0]    Discharged Condition: good      Procedures Performed: Laparoscopic bilateral salpingectomy, D&C/H, Nexplanon removal    Hospital Course: Patient was admitted on the day of surgery, and underwent an uncomplicated procedure. See dictated op note. Disposition: home    Patient Instructions: Activity: activity as tolerated, no sex for 2 weeks, and no driving while on analgesics  Diet: regular  Wound Care: as directed    Discharge Medication:      Medication List        START taking these medications      HYDROcodone-acetaminophen 5-325 MG per tablet  Commonly known as: Norco  Take 1 tablet by mouth every 4 hours as needed for Pain for up to 3 days. Intended supply: 3 days.  Take lowest dose possible to manage pain            CONTINUE taking these medications      acetaminophen 500 MG tablet  Commonly known as: APAP Extra Strength  Take 1 tablet by mouth every 6 hours as needed for Pain     ARIPiprazole 15 MG tablet  Commonly known as: ABILIFY     buPROPion 300 MG extended release tablet  Commonly known as: WELLBUTRIN XL  Take 1 tablet by mouth every morning     famotidine 40 MG tablet  Commonly known as: PEPCID  Take 1 tablet by mouth every evening     fluticasone 50 MCG/ACT nasal spray  Commonly known as: FLONASE     levothyroxine 50 MCG tablet  Commonly known as: Euthyrox  Take 1 tablet by mouth once daily     Symbicort 160-4.5 MCG/ACT Aero  Generic drug: budesonide-formoterol  Inhale 2 puffs into the lungs 2 times daily     topiramate 50 MG tablet  Commonly known as: TOPAMAX     traZODone 100 MG tablet  Commonly known as: DESYREL     venlafaxine 150 MG extended release capsule  Commonly known as: EFFEXOR XR            STOP taking these medications      prazosin 1 MG capsule  Commonly known as: MINIPRESS               Where to Get Your Medications        These medications were sent to [address], [address] [place] [address]  [facility] [address] [address]      Phone: [phone]   HYDROcodone-acetaminophen 5-325 MG per tablet          Discharge Date: 8/18/22    Condition: Good    Follow-up with Marisol Giraldo MD in 1 week    Signed:  Electronically signed by Marisol Giraldo MD on 8/18/2022 at 9:57 AM

## 2022-08-18 NOTE — PROGRESS NOTES
0078: patient arrived to pacu via bed, OR staff at bedside, attached to monitor. Report received. Vitals remain stable. Patient awake and talking, incision sites are clean, dry, and band aids are intact. Sophia pad in place, no bleeding noted, IV infusing via gravity. RN remains at bedside. 3023: patient states she is having some pain, Dr. Angelica Olmos notified. Orders received. 1003: patient medicated for pain, see mar    1010: patient tolerating ice chips. Patient continues to have some pain, ice applied to abdomen, attempted to reposition. Patient remains uncomfortable. 1016: patient medicated for pain, see mar     1020: patient states pain is better, continue to monitor. Vital signs remains stable. Patient alert and talking. 1025: patient resting in bed with eyes closed. Continue to monitor. 1030: patient resting with eyes closed. Continue to monitor. 1035: patient resting in bed, eyes closed. Continue to monitor. 1040: patient meets requirements to move to phase 2. Patient transported back to room via bed, Boyfriend at bedside. Snack and drink provided. Call light within reach. 1049: patient given norco for pain. Instructed to eat so she does not get nauseous. 1055: [address] on [facility] to have pain medication script cancelled due to [facility]'s pharmacy filling it and delivering it to patient. 1117: Discharge instructions reviewed with patient and family member. All questions were addressed and answered. Patient and family member verbalized understanding of discharge plan. Boyfriend at bedside assisting patient with getting dressed. 1135: patient ambulated to bathroom without difficulty. 1140: patient ambulated to discharge lobby in stable condition. Patient discharged home with boyfriend.

## 2022-08-18 NOTE — H&P
[address] [address]  History and Physicial      Patient:  Ellen Hurtado  YOB: 1980  MRN: [id number]     Acct: [de-identified]    HISTORY OF PRESENT ILLNESS:     Ellen Hurtado is a 36 y.o. female with menorhagia and desire for risk reductive salpingectomy. Obstetric History: No obstetric history on file. Past Medical History:        Diagnosis Date    Abdominal pain, right upper quadrant     Acute cystitis 11/05/2016    Allergic rhinitis     Anxiety     CAD (coronary artery disease)     Depression (emotion)     DUB (dysfunctional uterine bleeding)     Generalized anxiety disorder     GERD (gastroesophageal reflux disease)     Hypertriglyceridemia 11/11/2014    Hypoglycemia     Hypothyroid 08/10/2014    Movement disorder     Pancreatitis 11/10/2014    PONV (postoperative nausea and vomiting)     Psychiatric problem     Scoliosis     Tobacco abuse        Past Surgical History:        Procedure Laterality Date    COLONOSCOPY N/A 7/27/2019    COLONOSCOPY WITH BIOPSY performed by Ryanne Bernstein MD at [address] [facility] Right 08/2017    KNEE SURGERY Right 02/11/2020       Medications: Scheduled Meds:   sodium chloride flush  5-40 mL IntraVENous 2 times per day    scopolamine  1 patch TransDERmal Once     Continuous Infusions:   sodium chloride      sodium chloride       PRN Meds:.sodium chloride flush, sodium chloride    Allergies:  Adhesive tape and Bee pollen    Social History:    reports that she has been smoking cigarettes. She started smoking about 24 years ago. She has a 22.00 pack-year smoking history. She has never used smokeless tobacco. She reports that she does not currently use alcohol. She reports that she does not use drugs.     Family History:       Problem Relation Age of Onset    Diabetes Mother     High Blood Pressure Mother     Thyroid Disease Mother     Heart Disease Father     Diabetes Father     Cancer Sister         cervical Review of Systems:  General ROS: negative  Respiratory ROS: negative  Cardiovascular ROS: negative  Gastrointestinal ROS: negative  Musculoskeletal ROS: negative  Neurological ROS: negative    Physical Exam:    Vitals:Patient Vitals for the past 24 hrs:   BP Temp Temp src Pulse Resp SpO2 Height Weight   08/18/22 0746 125/80 97.3 °F (36.3 °C) Temporal 72 16 96 % 5' 7\" (1.702 m) 236 lb (107 kg)          Wt Readings from Last 1 Encounters:   08/18/22 236 lb (107 kg)         General appearance - alert, well appearing, and in no distress  Abdomen - deferred  Pelvic - deferred  Neurological - alert, oriented, normal speech, no focal findings or movement disorder noted  Musculoskeletal - no joint tenderness, deformity or swelling  Extremities - peripheral pulses normal, no pedal edema, no clubbing or cyanosis  Skin - normal coloration and turgor, no rashes, no suspicious skin lesions noted      Review of Labs and Diagnostic Testing:      CBC:   Lab Results   Component Value Date/Time    WBC 7.2 06/01/2022 09:00 PM    RBC 4.04 06/01/2022 09:00 PM    RBC 4.40 04/05/2012 10:34 AM    HGB 12.5 06/01/2022 09:00 PM    HCT 37.5 06/01/2022 09:00 PM    MCV 92.8 06/01/2022 09:00 PM    RDW 13.3 05/31/2022 10:43 AM     06/01/2022 09:00 PM         Radiology:        Assessment:    Patient Active Problem List   Diagnosis    Scoliosis deformity of spine    Hypothyroidism    Obesity with body mass index (BMI) of 30.0 to 39.9    Tobacco abuse    Intermittent palpitations    PTSD (post-traumatic stress disorder)    Panic disorder with agoraphobia    TANMAY (generalized anxiety disorder)    Severe episode of recurrent major depressive disorder, without psychotic features (HCC)    Moderate persistent reactive airway disease without complication    Menorrhagia         Plan: 1. Laparoscopic bilateral salpingectomy, D&C/H endometrial ablation, Nexplanon removal (left arm)      Electronically signed by Sandy Hurt MD on 8/18/2022 at 8:35 AM

## 2022-08-18 NOTE — DISCHARGE INSTRUCTIONS
DILATATION & CURETTAGE AND/OR HYSTEROSCOPY and/or ABLATION  DISCHARGE INSTRUCTIONS FOR  PATIENTS AND FAMILY  [facility]  [phone]  [address]  [address]  [address] [facility], [address]  1) Since you may experience some intermittent light-headedness for the next several hours, we suggest you plan on bed rest or quiet relaxation this evening. You must have a friend or relative stay with you tonight. 2)  Because of the sedation you have received, it is recommended that you do not drive a motor vehicle, operate any kind of machinery, or sign any contractual agreement for 24 hours following the procedure. 3)  You should not take alcoholic beverages tonight and only take sleeping medication that has been specifically prescribed for you by your physician. 4)  Eat lightly for your first meal and then gradually progress yourself back to a regular diet. 5)  If you experience pain in your surgical area, take Tylenol, or the pain medication prescribed by your doctor. Some pain medications are very irritating when taken on an empty stomach, so try to take the medication with a light meal or a glass of milk. 6)  If you have any fever, chills, bleeding, or uncontrollable pain or any other problems, notify your surgeon. 7)  Other instructions:   Pain:  Ibuprofen every 6 hours as needed, call if no relief. Activity:  Take it easy for 1-2 days   Exercise:  None for 1 week   Sex, douching, tampons:  None for 1 week   Shower: In 24 hours   Tub bath, swimming:  None for 1 week   Appointment:  Call for an appointment in 1 week if appointment not already made.      Lily Stahl MD 8/18/2022 9:55 AM

## 2022-08-18 NOTE — OP NOTE
Gyn Service    Operative Report        Pt Name: Trudi Helms  MRN: [id number] Anil Shelton #: [de-identified]  YOB: 1980  Procedure Performed By: Farzaneh Shultz MD, MD      Pre-operative Diagnosis:  MENORRHAGI Risk reductive salpingectomy    Post-operative Diagnosis:  SAME    PMH:  Past Medical History:   Diagnosis Date    Abdominal pain, right upper quadrant     Acute cystitis 11/05/2016    Allergic rhinitis     Anxiety     CAD (coronary artery disease)     Depression (emotion)     DUB (dysfunctional uterine bleeding)     Generalized anxiety disorder     GERD (gastroesophageal reflux disease)     Hypertriglyceridemia 11/11/2014    Hypoglycemia     Hypothyroid 08/10/2014    Movement disorder     Pancreatitis 11/10/2014    PONV (postoperative nausea and vomiting)     Psychiatric problem     Scoliosis     Tobacco abuse        Procedure:  Laparoscopic bilateral salpingectomy HYSTEROSCOPY, D AND C, Nexplanon removal    Surgeon:  Farzaneh Shultz MD     Anesthesia:  General    Estimated blood loss: 5cc    Findings:  Ut sound to 8 cm with a cavity length of 4.0cm. Complications:  NONE    Specimens: Endometrial curettings    Procedure: The patient was taken to the operating room where she was placed  under general anesthesia in dorsolithotomy position, prepped and draped. The left arm was prepped with betadine. An incision was made over the distal end of the nexplanon. The nexplanon was brought through the incision and grasped with a hemostat. It was removed intact and without difficulty. The betadine was then cleansed from her arm- the skin was red were the betadine was. On exam, the cervix was mobile and in mid position. The cervix was grasped with  a single-tooth tenaculum and sounded to 8 cm and was progressively dilated. A uterine manipulator was placed. Attention was then turned to the abdominal portion of the procedure.     A 5mm Visiport was used to gain entry into the abdominal cavity under direct visualization. Insufflation occurred under direct visualization as well. Inspection of the abdomen revealed normal appearing uterus, tubes, ovaries, and liver. The left fallopian tube was grasped at its fimbriated end and coming across the mesosalpinx and eventually the cornual region of the fallopian tubes, the entire tube was removed from the pelvis without difficulty. The right tube was grasped at the fimbriated end and coming across the mesosalpinx and eventually the cornual region of the fallopian tube, the entire tubes was ligated and removed from the pelvis without difficulty. Adequate hemostasis was noted. All instruments were then removed from the abdomen. Skin incision was closed using 4-0 Vicryl. Attention was then returned to the vaginal portion of the procedure. The uterine manipulator was replaced with a single tooth tenculum. A hysteroscope was placed and normal saline used as the distension medium. Abnormalities of the uterus were not noted. A sharp curettage took place in a 360 degree manner until a gritty texture was noted. A small amount of tissue was obtained. The Jessica device  was then placed, the length was 4cm but the device was not able to pass the cavity width. Therefore the ablation was not able to be performed. The patient was awakened from general anesthesia and taken to the Recovery Room in good condition.         Julian Jeffries MD, MD 8/18/2022 9:59 AM

## 2022-08-18 NOTE — ANESTHESIA PRE PROCEDURE
Department of Anesthesiology  Preprocedure Note       Name:  Kirill Cr   Age:  36 y.o.  :  1980                                          MRN:  [id number]         Date:  2022      Surgeon: Nupur Casillas):  Shyanne Whyte MD    Procedure: Procedure(s):  Hysteroscopy D&C Laparoscopic Bilateral Salpingectomy Nexplanon Removal    Medications prior to admission:   Prior to Admission medications    Medication Sig Start Date End Date Taking? Authorizing Provider   HYDROcodone-acetaminophen (NORCO) 5-325 MG per tablet Take 1 tablet by mouth every 4 hours as needed for Pain for up to 3 days. Intended supply: 3 days.  Take lowest dose possible to manage pain 22 Yes Shyanne Whyte MD   levothyroxine (EUTHYROX) 50 MCG tablet Take 1 tablet by mouth once daily 22   Pratik Farmer MD   famotidine (PEPCID) 40 MG tablet Take 1 tablet by mouth every evening 22   Pratik Farmer MD   venlafaxine (EFFEXOR XR) 150 MG extended release capsule Take 150 mg by mouth daily 22   Historical Provider, MD   ARIPiprazole (ABILIFY) 15 MG tablet Take 15 mg by mouth daily 22   Historical Provider, MD   topiramate (TOPAMAX) 50 mg tablet TAKE 1 TABLET BY MOUTH AT BEDTIME 3/31/22   Historical Provider, MD   traZODone (DESYREL) 100 MG tablet  21   Historical Provider, MD   SYMBICORT 160-4.5 MCG/ACT AERO Inhale 2 puffs into the lungs 2 times daily 6/3/21   Jennifer Vaughan DO   prazosin (MINIPRESS) 1 MG capsule Take 3 capsules by mouth nightly 3/31/21 5/31/22  Dulce Maria Risk, APRN - CNP   buPROPion (WELLBUTRIN XL) 300 MG extended release tablet Take 1 tablet by mouth every morning 20   Teri Ford MD   fluticasone (FLONASE) 50 MCG/ACT nasal spray 1 spray by Each Nare route daily    Historical Provider, MD   acetaminophen (APAP EXTRA STRENGTH) 500 MG tablet Take 1 tablet by mouth every 6 hours as needed for Pain 10/23/17   Michaela Angelucci, PA-C       Current medications:    Current Facility-Administered Medications   Medication Dose Route Frequency Provider Last Rate Last Admin    0.9 % sodium chloride infusion   IntraVENous Continuous Lin Mooney MD   New Bag at 08/18/22 0855    sodium chloride flush 0.9 % injection 5-40 mL  5-40 mL IntraVENous 2 times per day Lin Mooney MD        sodium chloride flush 0.9 % injection 5-40 mL  5-40 mL IntraVENous PRN Lin Mooney MD        0.9 % sodium chloride infusion   IntraVENous PRN Lin Mooney MD        scopolamine (TRANSDERM-SCOP) transdermal patch 1 patch  1 patch TransDERmal Once Vivien Lona, DO   1 patch at 08/18/22 2032    sodium chloride flush 0.9 % injection 5-40 mL  5-40 mL IntraVENous 2 times per day Lin Mooney MD        sodium chloride flush 0.9 % injection 5-40 mL  5-40 mL IntraVENous PRN Lin Mooney MD        0.9 % sodium chloride infusion   IntraVENous PRN Lin Mooney MD        ibuprofen (ADVIL;MOTRIN) tablet 800 mg  800 mg Oral Q8H PRGOKUL Mooney MD        HYDROcodone-acetaminophen [facility]) 5-325 MG per tablet 1 tablet  1 tablet Oral Q4H PRGOKUL Mooney MD        Or    HYDROcodone-acetaminophen [facility]) 5-325 MG per tablet 2 tablet  2 tablet Oral Q4H NEETA Mooney MD        ondansetron (ZOFRAN-ODT) disintegrating tablet 4 mg  4 mg Oral Q8H PRGOKUL Mooney MD        Or    ondansetron [facility]) injection 4 mg  4 mg IntraVENous Q6H PRGOKUL Mooney MD         Facility-Administered Medications Ordered in Other Encounters   Medication Dose Route Frequency Provider Last Rate Last Admin    midazolam (VERSED) injection   IntraVENous PRN Meri Anthonys, APRN - CRNA   2 mg at 08/18/22 1614    propofol injection   IntraVENous PRN Meri Anthonys, APRN - CRNA   50 mg at 08/18/22 0902    fentaNYL (SUBLIMAZE) injection   IntraVENous PRN Meri Betzaida, APRN - CRNA   100 mcg at 08/18/22 0858    lidocaine PF 2 % injection   IntraVENous PRN Meri Anthonys, APRN - CRNA   02 mg at 08/18/22 0858    succinylcholine (ANECTINE) injection   IntraVENous PRN Lacey Burrs, APRN - CRNA   140 mg at 08/18/22 0858    rocuronium (ZEMURON) injection   IntraVENous PRN Lacey Burrs, APRN - CRNA   30 mg at 08/18/22 7397    dexamethasone (PF) (DECADRON) injection   IntraVENous PRN Lacey Burrs, APRN - CRNA   10 mg at 08/18/22 0911    ondansetron (ZOFRAN) injection   IntraVENous PRN Lacey Burrs, APRN - CRNA   4 mg at 08/18/22 0932    ketorolac (TORADOL) injection   IntraVENous PRN Lacey Burrs, APRN - CRNA   30 mg at 08/18/22 2740    sugammadex (BRIDION) 200 MG/2ML injection   IntraVENous PRN Lacey Burrs, APRN - CRNA   200 mg at 08/18/22 0947       Allergies:     Allergies   Allergen Reactions    Adhesive Tape      Paper tape      Bee Pollen        Problem List:    Patient Active Problem List   Diagnosis Code    Scoliosis deformity of spine M41.9    Hypothyroidism E03.9    Obesity with body mass index (BMI) of 30.0 to 39.9 E66.9    Tobacco abuse Z72.0    Intermittent palpitations R00.2    PTSD (post-traumatic stress disorder) F43.10    Panic disorder with agoraphobia F40.01    TANMAY (generalized anxiety disorder) F41.1    Severe episode of recurrent major depressive disorder, without psychotic features (HCC) F33.2    Moderate persistent reactive airway disease without complication P23.44    Menorrhagia N92.0    S/P laparoscopy S15.034       Past Medical History:        Diagnosis Date    Abdominal pain, right upper quadrant     Acute cystitis 11/05/2016    Allergic rhinitis     Anxiety     CAD (coronary artery disease)     Depression (emotion)     DUB (dysfunctional uterine bleeding)     Generalized anxiety disorder     GERD (gastroesophageal reflux disease)     Hypertriglyceridemia 11/11/2014    Hypoglycemia     Hypothyroid 08/10/2014    Movement disorder     Pancreatitis 11/10/2014    PONV (postoperative nausea and vomiting)     Psychiatric problem     Scoliosis     Tobacco abuse        Past Surgical History:        Procedure Laterality Date    COLONOSCOPY N/A 7/27/2019    COLONOSCOPY WITH BIOPSY performed by Naeem Villar MD at [facility]   [address]      KNEE SURGERY Right 08/2017    KNEE SURGERY Right 02/11/2020       Social History:    Social History     Tobacco Use    Smoking status: Some Days     Packs/day: 1.00     Years: 22.00     Pack years: 22.00     Types: Cigarettes     Start date: 1/16/1998    Smokeless tobacco: Never   Substance Use Topics    Alcohol use: Not Currently     Comment: social drinking                                Ready to quit: Not Answered  Counseling given: Not Answered      Vital Signs (Current):   Vitals:    08/18/22 0746 08/18/22 1000 08/18/22 1001   BP: 125/80 117/67 116/67   Pulse: 72  63   Resp: 16  16   Temp: 97.3 °F (36.3 °C) (!) 96.6 °F (35.9 °C)    TempSrc: Temporal Temporal    SpO2: 96%  96%   Weight: 236 lb (107 kg)     Height: 5' 7\" (1.702 m)                                                BP Readings from Last 3 Encounters:   08/18/22 116/67   06/01/22 (!) 127/96   05/31/22 120/80       NPO Status: Time of last liquid consumption: 2100                        Time of last solid consumption: 2100                        Date of last liquid consumption: 08/17/22                        Date of last solid food consumption: 08/17/22    BMI:   Wt Readings from Last 3 Encounters:   08/18/22 236 lb (107 kg)   06/01/22 215 lb (97.5 kg)   05/02/22 210 lb (95.3 kg)     Body mass index is 36.96 kg/m².     CBC:   Lab Results   Component Value Date/Time    WBC 7.2 06/01/2022 09:00 PM    RBC 4.04 06/01/2022 09:00 PM    RBC 4.40 04/05/2012 10:34 AM    HGB 12.5 06/01/2022 09:00 PM    HCT 37.5 06/01/2022 09:00 PM    MCV 92.8 06/01/2022 09:00 PM    RDW 13.3 05/31/2022 10:43 AM     06/01/2022 09:00 PM       CMP:   Lab Results   Component Value Date/Time     06/01/2022 09:00 PM    K 4.2 06/01/2022 09:00 PM K 3.7 07/27/2019 05:56 AM     06/01/2022 09:00 PM    CO2 23 06/01/2022 09:00 PM    BUN 13 06/01/2022 09:00 PM    CREATININE 0.8 06/01/2022 09:00 PM    LABGLOM 78 06/01/2022 09:00 PM    GLUCOSE 87 06/01/2022 09:00 PM    GLUCOSE 79 04/05/2012 10:34 AM    PROT 7.2 11/23/2021 09:38 AM    CALCIUM 8.6 06/01/2022 09:00 PM    BILITOT 0.3 11/23/2021 09:38 AM    ALKPHOS 81 11/23/2021 09:38 AM    AST 13 11/23/2021 09:38 AM    ALT 9 11/23/2021 09:38 AM       POC Tests: No results for input(s): POCGLU, POCNA, POCK, POCCL, POCBUN, POCHEMO, POCHCT in the last 72 hours. Coags:   Lab Results   Component Value Date/Time    INR 0.98 07/26/2019 01:15 AM    APTT 34.3 07/26/2019 01:15 AM       HCG (If Applicable):   Lab Results   Component Value Date    PREGTESTUR NEGATIVE 12/28/2018    PREGSERUM NEGATIVE 06/01/2022        ABGs: No results found for: PHART, PO2ART, AEX3PSR, GJJ1CLE, BEART, R5GCCXVN     Type & Screen (If Applicable):  No results found for: LABABO, LABRH    Drug/Infectious Status (If Applicable):  Lab Results   Component Value Date/Time    HEPCAB NEGATIVE 02/15/2012 09:20 PM       COVID-19 Screening (If Applicable):   Lab Results   Component Value Date/Time    COVID19 NOT  DETECTED 05/02/2022 07:48 AM           Anesthesia Evaluation  Patient summary reviewed and Nursing notes reviewed   history of anesthetic complications: PONV. Airway: Mallampati: II  TM distance: >3 FB   Neck ROM: full  Mouth opening: > = 3 FB   Dental:          Pulmonary:normal exam  breath sounds clear to auscultation  (+) current smoker          Patient did not smoke on day of surgery. Cardiovascular:    (+) CAD:,                   Neuro/Psych:   (+) psychiatric history:            GI/Hepatic/Renal:   (+) GERD:, morbid obesity          Endo/Other:    (+) hypothyroidism::., .          Pt had no PAT visit       Abdominal:   (+) obese,     Abdomen: soft. Vascular: negative vascular ROS.          Other Findings: Anesthesia Plan      general     ASA 3       Induction: intravenous. MIPS: Postoperative opioids intended and Prophylactic antiemetics administered. Anesthetic plan and risks discussed with patient. Plan discussed with CRNA.                     [address], DO   8/18/2022

## 2022-08-18 NOTE — ANESTHESIA POSTPROCEDURE EVALUATION
Department of Anesthesiology  Postprocedure Note    Patient: Venecia Tan  MRN: [id number]  YOB: 1980  Date of evaluation: 8/18/2022      Procedure Summary     Date: 08/18/22 Room / Location: [address] [place] [address] 04 / [address] [place] [address]    Anesthesia Start: 2888 Anesthesia Stop: 2103    Procedure: Hysteroscopy D&C Laparoscopic Bilateral Salpingectomy Nexplanon Removal (Bilateral) Diagnosis:       Menorrhagia with regular cycle      (Menorrhagia with regular cycle [N92.0])    Surgeons: Sandy Hurt MD Responsible Provider: Ethyl Net, DO    Anesthesia Type: general ASA Status: 3          Anesthesia Type: No value filed.     Humza Phase I: Humza Score: 10    Humza Phase II: Humza Score: 10      Anesthesia Post Evaluation    Patient location during evaluation: PACU  Patient participation: complete - patient participated  Level of consciousness: awake and alert  Pain score: 3  Airway patency: patent  Nausea & Vomiting: no nausea and no vomiting  Complications: no  Cardiovascular status: hemodynamically stable and blood pressure returned to baseline  Respiratory status: spontaneous ventilation, room air and acceptable  Hydration status: stable

## 2022-08-23 NOTE — PROGRESS NOTES
Ellen Hurtado (:  1980) is a 36 y.o. female,Established patient, here for evaluation of the following chief complaint(s):  Follow-up (Weight discussion)         ASSESSMENT/PLAN:  1. Tobacco abuse  -     Full PFT Study With Bronchodilator; Future  2. Depression screen  -     [place] DEPRESSION SCREEN ANNUAL  3. Moderate persistent reactive airway disease without complication  -     Full PFT Study With Bronchodilator; Future  4. Chest pain, unspecified type  -     Stress test, lexiscan; Future    Order PFT due to chronic bronchitis, discussed smoking at length. Reorder stress test due to persistent chest pain. Discussed obesity at length. Calorie counting and dietary changes discussed. Discuss further at next visit. Discussed sinus rinse for chronic allergies. Return in about 4 weeks (around 2022). Subjective   SUBJECTIVE/OBJECTIVE:  HPI    Smoking  Likely causing many problems below, discussed. H/O Menorrhagia  : Laparoscopic bilateral salpingectomy, D&C/H, Nexplanon removal.  Done well since procedure. No further needs here. Chest Pain  Likely anxiety based on history, but has not completed stress test as previously ordered. \"Complaining of chest pain on and off she states that this happens about twice a week. And the most of time is associated with exercise however she will have symptoms at rest on occasion. She usually last for about 5 minutes and then resolves on its own. She states that the pain is located in her mid chest and does not radiate. She states that she had previously seen a cardiologist for this in the past and was ordered to get an echocardiogram and a stress test however these were never completed. No recent EKG. GERD  Not taking anything for this, denied red flag symptoms. Obesity  Is not calorie counting currently, discussed. Did not since last visit. Has downloaded myfitness pal.       Vaginal Bleeding  Seeing Dr. Delmer Chaney. No current questions. Hypothyroid   Has had worsening fatigue, constipation, and cold intolerane since self discontinuing thyroid medications. Requests refill. Unclear cause for this but has had abnormal thyroid levels in past     [address] psychologist.  No SI.  Current w/ therapist.  Delores Tavarez. Allergies  history of chronic rhinorrhea and nasal congestion. Atrovent nasal and daily antihistamine    Health Maintenance  Normal        Review of Systems   Constitutional:  Negative for appetite change and unexpected weight change. HENT:  Negative for congestion and hearing loss. Eyes:  Negative for pain and visual disturbance. Respiratory:  Negative for cough and shortness of breath. Cardiovascular:  Negative for chest pain and leg swelling. Gastrointestinal:  Negative for abdominal pain, constipation and diarrhea. Genitourinary:  Negative for difficulty urinating, dysuria, hematuria, menstrual problem, pelvic pain, vaginal bleeding, vaginal discharge and vaginal pain. Musculoskeletal:  Negative for arthralgias and myalgias. Psychiatric/Behavioral:  Negative for behavioral problems and sleep disturbance. Objective   Physical Exam  Vitals and nursing note reviewed. Constitutional:       General: She is not in acute distress. HENT:      Head: Normocephalic and atraumatic. Nose: Nose normal.      Mouth/Throat:      Mouth: Mucous membranes are moist.      Pharynx: Oropharynx is clear. Eyes:      Extraocular Movements: Extraocular movements intact. Pupils: Pupils are equal, round, and reactive to light. Cardiovascular:      Rate and Rhythm: Normal rate and regular rhythm. Pulses: Normal pulses. Heart sounds: Normal heart sounds. Pulmonary:      Effort: Pulmonary effort is normal.      Breath sounds: Normal breath sounds. Abdominal:      Palpations: Abdomen is soft. Tenderness: There is no abdominal tenderness.    Musculoskeletal: General: No signs of injury. Normal range of motion. Cervical back: Normal range of motion and neck supple. Skin:     General: Skin is warm and dry. Capillary Refill: Capillary refill takes less than 2 seconds. Neurological:      General: No focal deficit present. Mental Status: She is alert and oriented to person, place, and time. Mental status is at baseline. Psychiatric:         Mood and Affect: Mood normal.         Behavior: Behavior normal.                An electronic signature was used to authenticate this note.     --Bossman Rey MD

## 2022-08-24 RX ORDER — ESTRADIOL 1 MG/1
1 TABLET ORAL DAILY
COMMUNITY
Start: 2022-06-08 | End: 2022-09-03

## 2022-08-24 RX ORDER — CLONAZEPAM 0.5 MG/1
0.5 TABLET ORAL 3 TIMES DAILY
COMMUNITY
Start: 2022-08-03

## 2022-08-24 RX ORDER — BUSPIRONE HYDROCHLORIDE 15 MG/1
15 TABLET ORAL 2 TIMES DAILY
COMMUNITY
Start: 2022-06-22

## 2022-08-24 RX ORDER — TOPIRAMATE 100 MG/1
100 TABLET, FILM COATED ORAL NIGHTLY
COMMUNITY
Start: 2022-07-14

## 2022-08-25 ENCOUNTER — OFFICE VISIT (OUTPATIENT)
Dept: FAMILY MEDICINE CLINIC | Age: 42
End: 2022-08-25
Payer: COMMERCIAL

## 2022-08-25 VITALS
BODY MASS INDEX: 37.42 KG/M2 | DIASTOLIC BLOOD PRESSURE: 62 MMHG | SYSTOLIC BLOOD PRESSURE: 122 MMHG | RESPIRATION RATE: 16 BRPM | HEART RATE: 67 BPM | HEIGHT: 67 IN | TEMPERATURE: 97 F | WEIGHT: 238.4 LBS | OXYGEN SATURATION: 96 %

## 2022-08-25 DIAGNOSIS — J45.40 MODERATE PERSISTENT REACTIVE AIRWAY DISEASE WITHOUT COMPLICATION: ICD-10-CM

## 2022-08-25 DIAGNOSIS — Z72.0 TOBACCO ABUSE: Primary | ICD-10-CM

## 2022-08-25 DIAGNOSIS — Z13.31 DEPRESSION SCREEN: ICD-10-CM

## 2022-08-25 DIAGNOSIS — R07.9 CHEST PAIN, UNSPECIFIED TYPE: ICD-10-CM

## 2022-08-25 PROCEDURE — 99214 OFFICE O/P EST MOD 30 MIN: CPT | Performed by: STUDENT IN AN ORGANIZED HEALTH CARE EDUCATION/TRAINING PROGRAM

## 2022-08-25 PROCEDURE — 4004F PT TOBACCO SCREEN RCVD TLK: CPT | Performed by: STUDENT IN AN ORGANIZED HEALTH CARE EDUCATION/TRAINING PROGRAM

## 2022-08-25 PROCEDURE — G8417 CALC BMI ABV UP PARAM F/U: HCPCS | Performed by: STUDENT IN AN ORGANIZED HEALTH CARE EDUCATION/TRAINING PROGRAM

## 2022-08-25 PROCEDURE — G8427 DOCREV CUR MEDS BY ELIG CLIN: HCPCS | Performed by: STUDENT IN AN ORGANIZED HEALTH CARE EDUCATION/TRAINING PROGRAM

## 2022-08-25 ASSESSMENT — PATIENT HEALTH QUESTIONNAIRE - PHQ9
SUM OF ALL RESPONSES TO PHQ QUESTIONS 1-9: 0
SUM OF ALL RESPONSES TO PHQ9 QUESTIONS 1 & 2: 0
SUM OF ALL RESPONSES TO PHQ QUESTIONS 1-9: 0
1. LITTLE INTEREST OR PLEASURE IN DOING THINGS: 0
2. FEELING DOWN, DEPRESSED OR HOPELESS: 0

## 2022-08-25 ASSESSMENT — ENCOUNTER SYMPTOMS
COUGH: 0
CONSTIPATION: 0
SHORTNESS OF BREATH: 0
ABDOMINAL PAIN: 0
DIARRHEA: 0
EYE PAIN: 0

## 2022-08-25 NOTE — PROGRESS NOTES
S: 36 y.o. female with   Chief Complaint   Patient presents with    Follow-up     Weight discussion       HPI: please see resident note for HPI and ROS. 54-year-old female with history of mood disorder, tobacco abuse, obesity presenting for follow-up. Psychiatry needs are managed at [facility]. Patient has been following calorie counting for weight loss-has been unsuccessful so far. Declines dietitian referral.  Would like to continue to work on diet changes. Is not ready to quit smoking at this time. Has a diagnosis of reactive airways disease-no PFTs performed in the past.  Has also had chronic chest pain for which she has been seen multiple times. Stress test have been ordered but not completed. BP Readings from Last 3 Encounters:   08/25/22 122/62   08/18/22 113/80   06/01/22 (!) 127/96     Wt Readings from Last 3 Encounters:   08/25/22 238 lb 6.4 oz (108.1 kg)   08/18/22 236 lb (107 kg)   06/01/22 215 lb (97.5 kg)       O: VS:  height is 5' 7\" (1.702 m) and weight is 238 lb 6.4 oz (108.1 kg). Her skin temperature is 97 °F (36.1 °C). Her blood pressure is 122/62 and her pulse is 67. Her respiration is 16 and oxygen saturation is 96%. AAO/NAD, appropriate affect for mood     Diagnosis Orders   1. Tobacco abuse  Full PFT Study With Bronchodilator      2. Depression screen  [place] DEPRESSION SCREEN ANNUAL      3. Moderate persistent reactive airway disease without complication  Full PFT Study With Bronchodilator      4. Chest pain, unspecified type  Stress test, lexiscan          Plan:  Please refer to resident note for full plan. Mood disorder: Follow-up with psychiatry at [facility] as scheduled. Obesity: BMI 37.34. Has been unsuccessful with calorie counting. Resident physician discussed diet changes and goals with patient today. She declines dietitian referral at this time. Plan to follow-up in 1 month and reassess. Tobacco dependence: Chronic, uncontrolled. Not ready to quit at this time.   Cessation encouraged. Reactive airway disease: Patient has a diagnosis of reactive airway disease, though has not had PFTs performed. Plan to obtain PFT study for evaluation. Chest pain: Chronic issue. Asymptomatic today. Stress test ordered previously but not completed. Plan to reorder stress test at this time and encouraged follow-up. Follow-up in 1 month. Health Maintenance Due   Topic Date Due    Pneumococcal 0-64 years Vaccine (1 - PCV) Never done    Depression Monitoring  Never done    COVID-19 Vaccine (2 - Booster for [employer] series) 05/20/2021       Attending Physician Statement  I have discussed the case, including pertinent history and exam findings with the resident. I also have seen the patient and performed key portions of the examination. I agree with the documented assessment and plan as documented by the resident.         Joce Neville DO 8/25/2022 2:58 PM

## 2022-08-25 NOTE — PROGRESS NOTES
Health Maintenance Due   Topic Date Due    Pneumococcal 0-64 years Vaccine (1 - PCV) Never done    Depression Monitoring  Never done    COVID-19 Vaccine (2 - Booster for [employer] series) 05/20/2021

## 2022-09-03 ENCOUNTER — HOSPITAL ENCOUNTER (EMERGENCY)
Age: 42
Discharge: HOME | End: 2022-09-03
Payer: COMMERCIAL

## 2022-09-03 VITALS
TEMPERATURE: 98.3 F | DIASTOLIC BLOOD PRESSURE: 72 MMHG | SYSTOLIC BLOOD PRESSURE: 108 MMHG | RESPIRATION RATE: 16 BRPM | OXYGEN SATURATION: 95 % | HEIGHT: 67 IN | BODY MASS INDEX: 36.1 KG/M2 | HEART RATE: 60 BPM | WEIGHT: 230 LBS

## 2022-09-03 DIAGNOSIS — R11.2 NON-INTRACTABLE VOMITING WITH NAUSEA, UNSPECIFIED VOMITING TYPE: Primary | ICD-10-CM

## 2022-09-03 LAB — SARS-COV-2, NAA: NOT  DETECTED

## 2022-09-03 PROCEDURE — 99213 OFFICE O/P EST LOW 20 MIN: CPT | Performed by: NURSE PRACTITIONER

## 2022-09-03 PROCEDURE — 99213 OFFICE O/P EST LOW 20 MIN: CPT

## 2022-09-03 PROCEDURE — 87635 SARS-COV-2 COVID-19 AMP PRB: CPT

## 2022-09-03 RX ORDER — ONDANSETRON 4 MG/1
4 TABLET, ORALLY DISINTEGRATING ORAL EVERY 8 HOURS PRN
Qty: 15 TABLET | Refills: 0 | Status: SHIPPED | OUTPATIENT
Start: 2022-09-03

## 2022-09-03 ASSESSMENT — ENCOUNTER SYMPTOMS
VOMITING: 1
RHINORRHEA: 0
EYE PAIN: 0
SINUS PRESSURE: 0
DIARRHEA: 1
COUGH: 1
SINUS PAIN: 0
EYE ITCHING: 0
NAUSEA: 1
EYE REDNESS: 0

## 2022-09-03 ASSESSMENT — PAIN - FUNCTIONAL ASSESSMENT: PAIN_FUNCTIONAL_ASSESSMENT: NONE - DENIES PAIN

## 2022-09-03 NOTE — ED TRIAGE NOTES
Pt to [facility] ambulatory with a cough, and nausea. This started on Wednesday. Pt needs a COVID-19 test for work.

## 2022-09-03 NOTE — ED PROVIDER NOTES
[phone]       Chief Complaint   Patient presents with    Cough    Nausea       Nurses Notes reviewed and I agree except as noted in the HPI. HISTORY OF PRESENT ILLNESS   Sachin Walter is a 36 y.o. female who presents with need for covid test due to cough and nausea for the last 1 week. Vomited x 2 in the last 24 hours. Cough is mostly resolved. Afebrile, tolerating fluids. REVIEW OF SYSTEMS     Review of Systems   Constitutional:  Positive for activity change, appetite change and fatigue. Negative for fever. HENT:  Positive for congestion. Negative for postnasal drip, rhinorrhea, sinus pressure and sinus pain. Eyes:  Negative for pain, redness and itching. Respiratory:  Positive for cough. Cardiovascular:  Negative for chest pain and leg swelling. Gastrointestinal:  Positive for diarrhea (1 week ago.), nausea and vomiting. Endocrine: Negative for cold intolerance and heat intolerance. Genitourinary: Negative. Musculoskeletal:  Positive for myalgias. Skin:  Positive for pallor. Allergic/Immunologic: Positive for environmental allergies. Neurological:  Positive for light-headedness. Negative for headaches. Hematological:  Negative for adenopathy. Does not bruise/bleed easily. Psychiatric/Behavioral: Negative.        PAST MEDICAL HISTORY         Diagnosis Date    Abdominal pain, right upper quadrant     Acute cystitis 11/05/2016    Allergic rhinitis     Anxiety     CAD (coronary artery disease)     Depression (emotion)     DUB (dysfunctional uterine bleeding)     Generalized anxiety disorder     GERD (gastroesophageal reflux disease)     Hypertriglyceridemia 11/11/2014    Hypoglycemia     Hypothyroid 08/10/2014    Movement disorder     Pancreatitis 11/10/2014    PONV (postoperative nausea and vomiting)     Psychiatric problem     Scoliosis     Tobacco abuse        SURGICAL HISTORY     Patient  has a past surgical history that includes fracture surgery; knee surgery (Right, 08/2017); Colonoscopy (N/A, 7/27/2019); knee surgery (Right, 02/11/2020); and Dilation and curettage of uterus (Bilateral, 8/18/2022). CURRENT MEDICATIONS       Discharge Medication List as of 9/3/2022  9:43 AM        CONTINUE these medications which have NOT CHANGED    Details   !! topiramate (TOPAMAX) 100 MG tablet Take 100 mg by mouth nightlyHistorical Med      clonazePAM (KLONOPIN) 0.5 MG tablet Take 0.5 mg by mouth 3 times daily. Historical Med      busPIRone (BUSPAR) 15 MG tablet Take 15 mg by mouth 2 times dailyHistorical Med      levothyroxine (EUTHYROX) 50 MCG tablet Take 1 tablet by mouth once daily, Disp-90 tablet, R-0Normal      venlafaxine (EFFEXOR XR) 150 MG extended release capsule Take 150 mg by mouth dailyHistorical Med      ARIPiprazole (ABILIFY) 15 MG tablet Take 15 mg by mouth dailyHistorical Med      !! topiramate (TOPAMAX) 50 mg tablet TAKE 1 TABLET BY MOUTH AT BEDTIMEHistorical Med      traZODone (DESYREL) 100 MG tablet Historical Med      SYMBICORT 160-4.5 MCG/ACT AERO Inhale 2 puffs into the lungs 2 times daily, Disp-11 g, R-5, DAWNormal      buPROPion (WELLBUTRIN XL) 300 MG extended release tablet Take 1 tablet by mouth every morning, Disp-30 tablet, R-5Normal      acetaminophen (APAP EXTRA STRENGTH) 500 MG tablet Take 1 tablet by mouth every 6 hours as needed for Pain, Disp-120 tablet, R-3Print       !! - Potential duplicate medications found. Please discuss with provider. ALLERGIES     Patient is is allergic to adhesive tape and bee pollen. FAMILY HISTORY     Patient'sfamily history includes Cancer in her sister; Diabetes in her father and mother; Heart Disease in her father; High Blood Pressure in her mother; Thyroid Disease in her mother. SOCIAL HISTORY     Patient  reports that she has been smoking cigarettes. She started smoking about 24 years ago. She has a 22.00 pack-year smoking history.  She has never used smokeless tobacco. She reports that she does not currently use alcohol. She reports that she does not use drugs. PHYSICAL EXAM     ED TRIAGE VITALS  BP: 108/72, Temp: 98.3 °F (36.8 °C), Heart Rate: 60, Resp: 16, SpO2: 95 %  Physical Exam  Vitals and nursing note reviewed. Constitutional:       Appearance: She is normal weight. She is not ill-appearing. HENT:      Head: Normocephalic. Right Ear: Tympanic membrane, ear canal and external ear normal.      Left Ear: Tympanic membrane, ear canal and external ear normal.      Nose: No congestion or rhinorrhea. Mouth/Throat:      Mouth: Mucous membranes are dry. Pharynx: No oropharyngeal exudate or posterior oropharyngeal erythema. Eyes:      Conjunctiva/sclera: Conjunctivae normal.   Cardiovascular:      Rate and Rhythm: Normal rate and regular rhythm. Pulses: Normal pulses. Heart sounds: Normal heart sounds. Pulmonary:      Effort: Pulmonary effort is normal.      Breath sounds: No wheezing, rhonchi or rales. Abdominal:      General: Abdomen is flat. Bowel sounds are normal. There is no distension. Palpations: Abdomen is soft. Tenderness: There is no abdominal tenderness. Musculoskeletal:         General: Normal range of motion. Cervical back: Normal range of motion and neck supple. Tenderness present. Lymphadenopathy:      Cervical: No cervical adenopathy. Skin:     General: Skin is warm and dry. Capillary Refill: Capillary refill takes less than 2 seconds. Coloration: Skin is not pale. Neurological:      General: No focal deficit present. Mental Status: She is alert and oriented to person, place, and time. Mental status is at baseline. Psychiatric:         Mood and Affect: Mood normal.         Behavior: Behavior normal.         Thought Content:  Thought content normal.       DIAGNOSTIC RESULTS   Labs:   Results for orders placed or performed during the hospital encounter of 09/03/22 COVID-19, Rapid   Result Value Ref Range    SARS-CoV-2, DANII NOT  DETECTED NOT DETECTED       IMAGING:  No orders to display     URGENT CARE COURSE:     Vitals:    09/03/22 0912   BP: 108/72   Pulse: 60   Resp: 16   Temp: 98.3 °F (36.8 °C)   TempSrc: Temporal   SpO2: 95%   Weight: 230 lb (104.3 kg)   Height: 5' 7\" (1.702 m)       Medications - No data to display  PROCEDURES:  None  FINALIMPRESSION    I have reviewed the patient's medical history in detail and updated the computerized patient record. HPI/ROS per the patient and caregiver. Overall non toxic in appearance. Answers questions appropriately. Conditions discussed and addressed this visit include:   Patient appears ill but non-toxic and well hydrated. Symptoms are mild ad can be managed at home with zofran to manage the nausea. Patient is to take medications as directed. Continue all home medications. Potential side effects of the medications were reviewed with the patient in detail. The patient can increase fluids. The patient can have Tylenol or Motrin for pain and fever as needed. Discussed with patient signs and symptoms that would require emergent evaluation and treatment. The patient will follow-up with their family physician or go to the ER if they develop any worsening symptoms. The patient was discharged in stable condition    1.  Non-intractable vomiting with nausea, unspecified vomiting type        DISPOSITION/PLAN   DISPOSITION Decision To Discharge 09/03/2022 09:44:15 AM    PATIENT REFERRED TO:  Pablo Arora MD  [place] [address]  [phone]    In 3 days  As needed, If symptoms worsen  DISCHARGE MEDICATIONS:  Discharge Medication List as of 9/3/2022  9:43 AM        START taking these medications    Details   ondansetron (ZOFRAN ODT) 4 MG disintegrating tablet Take 1 tablet by mouth every 8 hours as needed for Nausea or Vomiting, Disp-15 tablet, R-0Normal           Discharge Medication List as of 9/3/2022  9:43 SHANA Flood - SHANA Champion CNP  09/03/22 4844

## 2022-09-03 NOTE — Clinical Note
Antwan Weller was seen and treated in our emergency department on 9/3/2022. She may return to work on 09/04/2022. If you have any questions or concerns, please don't hesitate to call.       Carolyn Rosa, SHANA - CNP

## 2022-10-05 ENCOUNTER — TELEPHONE (OUTPATIENT)
Dept: FAMILY MEDICINE CLINIC | Age: 42
End: 2022-10-05

## 2022-10-05 DIAGNOSIS — R07.9 CHEST PAIN, UNSPECIFIED TYPE: Primary | ICD-10-CM

## 2022-10-05 NOTE — TELEPHONE ENCOUNTER
Original test was wrong test.  Will need to reorder correct test.  Placed order for this.   Patient has no followup ordered, can consider in 2 months after stress test has been completed

## 2022-10-05 NOTE — TELEPHONE ENCOUNTER
Per Kim Beard, The [place] stress test ordered for this patient is pending denial for a peer to peer as the clinical information does not support medical necessity for the ordered test. All available clinical has been submitted at this time. The denial reasoning has been uploaded into the media tab for you review. Peer to peer available  # [phone]  Tracking# [id number]    Please advise.

## 2022-11-04 NOTE — PROGRESS NOTES
Take 25 mcg by mouth Daily. No current facility-administered medications for this visit. Mental Status Exam:   Appearance: wearing shirt she slept in   [facility] from yard work  Behavior: Cooperative and Appropriate   Eye Contact: Maintained good eye contact   Psychomotor Activity: Normal   Speech: Slow   Mood: Depressed   Affect: gloomy   Thought Process: Goal directed and coherent   Associations: Goal directed and coherent   Thought content: The thought content was appropriate to questions. The patient denies any suicidal ideation or homicidal ideation. Perceptions: Perceptions were normal, the patient denied any auditory, tactile or visual hallucinations   Cognition: Patient is alert and oriented to time, place and person, no gross cognitive deficits   Memory:grossly intact  Attention:ok  Judgment: Fair   Insight: Fair   Impulse control: Intact     Assessment:   Sl improvement on bupropion   Diagnosis:   1. Moderate episode of recurrent major depressive disorder ([facility] [address]    2. Panic disorder    3.  PTSD (post-traumatic stress disorder)        Plan:   Continue bupropion 75 mg  Continue clonazepam 0.5 mg bid prn  Continue duloxetine 60 mg  Continue prazosin 3 mg  Return x 1 month English

## 2023-02-02 NOTE — PROGRESS NOTES
Follow all instructions given by your physician  NPO after midnight   Sips of water am of surgery with allowed medications  Bring insurance info and 's license  Wear comfortable clean, loose fitting clothing  No jewelry or contact lenses to be worn day of surgery  No glue on dentures morning of surgery;you will be asked to remove them for surgery. Case for glasses. Shower night before and morning of surgery with a liquid antibacterial soap, dry with fresh clean towel; no lotions, creams or powder. Clean sheets and pillow case on bed night before surgery  Bring medications in original bottles  Bring CPAP/BIPAP machine if you have one ( you may be charged if one is needed in recovery room )    Please refer to the SSI-Surgical Site Infection Flyer you hopefully received in the mail-together we can prevent infections; signs and symptoms reviewed. When discharged be sure you understand how to care for your wound and that you have the phone # to call if you have any concerns or questions about your wound.  needed at discharge and someone over 18 to stay with you for 24 hours overnight (surgery may be cancelled if you don't have this)  Report to [facility] on 2nd floor  If you would become ill prior to surgery, please call the surgeon  May have a visitor with you, we request that you limit to 2 visitors in pre-op area  Masks are recommended but not required, new masks at entrance desk  Call [phone] for any questions    Covid questionnaire Complete; Patient negative for symptoms or exposure. See documentation.

## 2023-02-02 NOTE — PROGRESS NOTES
Left message for Chris Henry Chi of Dr. Deepti Linda - questioning if patient needs lab work completed pre-op? Pt unsure if she received any orders. Await response.

## 2023-02-09 ENCOUNTER — ANESTHESIA EVENT (OUTPATIENT)
Dept: OPERATING ROOM | Age: 43
End: 2023-02-09
Payer: COMMERCIAL

## 2023-02-09 ENCOUNTER — HOSPITAL ENCOUNTER (OUTPATIENT)
Age: 43
Setting detail: OUTPATIENT SURGERY
Discharge: HOME | End: 2023-02-09
Attending: OBSTETRICS & GYNECOLOGY | Admitting: OBSTETRICS & GYNECOLOGY
Payer: COMMERCIAL

## 2023-02-09 ENCOUNTER — ANESTHESIA (OUTPATIENT)
Dept: OPERATING ROOM | Age: 43
End: 2023-02-09
Payer: COMMERCIAL

## 2023-02-09 VITALS
WEIGHT: 241.2 LBS | HEIGHT: 66 IN | BODY MASS INDEX: 38.76 KG/M2 | SYSTOLIC BLOOD PRESSURE: 132 MMHG | HEART RATE: 71 BPM | DIASTOLIC BLOOD PRESSURE: 89 MMHG | TEMPERATURE: 96.2 F | OXYGEN SATURATION: 98 % | RESPIRATION RATE: 18 BRPM

## 2023-02-09 DIAGNOSIS — Z90.710 S/P HYSTERECTOMY: Primary | ICD-10-CM

## 2023-02-09 DIAGNOSIS — N92.0 MENORRHAGIA WITH REGULAR CYCLE: ICD-10-CM

## 2023-02-09 PROBLEM — Z98.890 S/P LAPAROSCOPY: Status: RESOLVED | Noted: 2022-08-18 | Resolved: 2023-02-09

## 2023-02-09 PROCEDURE — 2580000003 HC RX 258: Performed by: OBSTETRICS & GYNECOLOGY

## 2023-02-09 PROCEDURE — 6370000000 HC RX 637 (ALT 250 FOR IP): Performed by: OBSTETRICS & GYNECOLOGY

## 2023-02-09 PROCEDURE — 6360000002 HC RX W HCPCS

## 2023-02-09 PROCEDURE — 7100000000 HC PACU RECOVERY - FIRST 15 MIN: Performed by: OBSTETRICS & GYNECOLOGY

## 2023-02-09 PROCEDURE — S2900 ROBOTIC SURGICAL SYSTEM: HCPCS | Performed by: OBSTETRICS & GYNECOLOGY

## 2023-02-09 PROCEDURE — 2580000003 HC RX 258: Performed by: NURSE ANESTHETIST, CERTIFIED REGISTERED

## 2023-02-09 PROCEDURE — 3600000019 HC SURGERY ROBOT ADDTL 15MIN: Performed by: OBSTETRICS & GYNECOLOGY

## 2023-02-09 PROCEDURE — 2500000003 HC RX 250 WO HCPCS: Performed by: NURSE ANESTHETIST, CERTIFIED REGISTERED

## 2023-02-09 PROCEDURE — 7100000010 HC PHASE II RECOVERY - FIRST 15 MIN: Performed by: OBSTETRICS & GYNECOLOGY

## 2023-02-09 PROCEDURE — 7100000001 HC PACU RECOVERY - ADDTL 15 MIN: Performed by: OBSTETRICS & GYNECOLOGY

## 2023-02-09 PROCEDURE — 3600000009 HC SURGERY ROBOT BASE: Performed by: OBSTETRICS & GYNECOLOGY

## 2023-02-09 PROCEDURE — 6360000002 HC RX W HCPCS: Performed by: NURSE ANESTHETIST, CERTIFIED REGISTERED

## 2023-02-09 PROCEDURE — 2709999900 HC NON-CHARGEABLE SUPPLY: Performed by: OBSTETRICS & GYNECOLOGY

## 2023-02-09 PROCEDURE — 6360000002 HC RX W HCPCS: Performed by: OBSTETRICS & GYNECOLOGY

## 2023-02-09 PROCEDURE — 7100000011 HC PHASE II RECOVERY - ADDTL 15 MIN: Performed by: OBSTETRICS & GYNECOLOGY

## 2023-02-09 PROCEDURE — 88307 TISSUE EXAM BY PATHOLOGIST: CPT

## 2023-02-09 PROCEDURE — 6370000000 HC RX 637 (ALT 250 FOR IP): Performed by: ANESTHESIOLOGY

## 2023-02-09 PROCEDURE — 6360000002 HC RX W HCPCS: Performed by: ANESTHESIOLOGY

## 2023-02-09 PROCEDURE — 3700000000 HC ANESTHESIA ATTENDED CARE: Performed by: OBSTETRICS & GYNECOLOGY

## 2023-02-09 PROCEDURE — 3700000001 HC ADD 15 MINUTES (ANESTHESIA): Performed by: OBSTETRICS & GYNECOLOGY

## 2023-02-09 RX ORDER — ACETAMINOPHEN 500 MG
1000 TABLET ORAL EVERY 8 HOURS SCHEDULED
Status: DISCONTINUED | OUTPATIENT
Start: 2023-02-09 | End: 2023-02-09 | Stop reason: HOSPADM

## 2023-02-09 RX ORDER — IBUPROFEN 600 MG/1
600 TABLET ORAL EVERY 8 HOURS SCHEDULED
Status: DISCONTINUED | OUTPATIENT
Start: 2023-02-09 | End: 2023-02-09 | Stop reason: HOSPADM

## 2023-02-09 RX ORDER — PROPOFOL 10 MG/ML
INJECTION, EMULSION INTRAVENOUS PRN
Status: DISCONTINUED | OUTPATIENT
Start: 2023-02-09 | End: 2023-02-09 | Stop reason: SDUPTHER

## 2023-02-09 RX ORDER — MORPHINE SULFATE 2 MG/ML
2 INJECTION, SOLUTION INTRAMUSCULAR; INTRAVENOUS
Status: DISCONTINUED | OUTPATIENT
Start: 2023-02-09 | End: 2023-02-09 | Stop reason: HOSPADM

## 2023-02-09 RX ORDER — SODIUM CHLORIDE 0.9 % (FLUSH) 0.9 %
5-40 SYRINGE (ML) INJECTION EVERY 12 HOURS SCHEDULED
Status: DISCONTINUED | OUTPATIENT
Start: 2023-02-09 | End: 2023-02-09 | Stop reason: HOSPADM

## 2023-02-09 RX ORDER — ONDANSETRON 4 MG/1
4 TABLET, ORALLY DISINTEGRATING ORAL EVERY 8 HOURS PRN
Status: DISCONTINUED | OUTPATIENT
Start: 2023-02-09 | End: 2023-02-09 | Stop reason: HOSPADM

## 2023-02-09 RX ORDER — MORPHINE SULFATE 2 MG/ML
4 INJECTION, SOLUTION INTRAMUSCULAR; INTRAVENOUS
Status: DISCONTINUED | OUTPATIENT
Start: 2023-02-09 | End: 2023-02-09 | Stop reason: HOSPADM

## 2023-02-09 RX ORDER — LIDOCAINE HYDROCHLORIDE 20 MG/ML
INJECTION, SOLUTION INTRAVENOUS PRN
Status: DISCONTINUED | OUTPATIENT
Start: 2023-02-09 | End: 2023-02-09 | Stop reason: SDUPTHER

## 2023-02-09 RX ORDER — LABETALOL HYDROCHLORIDE 5 MG/ML
10 INJECTION, SOLUTION INTRAVENOUS
Status: CANCELLED | OUTPATIENT
Start: 2023-02-09

## 2023-02-09 RX ORDER — DEXAMETHASONE SODIUM PHOSPHATE 10 MG/ML
INJECTION, EMULSION INTRAMUSCULAR; INTRAVENOUS PRN
Status: DISCONTINUED | OUTPATIENT
Start: 2023-02-09 | End: 2023-02-09 | Stop reason: SDUPTHER

## 2023-02-09 RX ORDER — KETOROLAC TROMETHAMINE 10 MG/1
10 TABLET, FILM COATED ORAL EVERY 6 HOURS PRN
Qty: 20 TABLET | Refills: 0 | Status: SHIPPED | OUTPATIENT
Start: 2023-02-09 | End: 2024-02-09

## 2023-02-09 RX ORDER — ONDANSETRON 2 MG/ML
4 INJECTION INTRAMUSCULAR; INTRAVENOUS EVERY 6 HOURS PRN
Status: DISCONTINUED | OUTPATIENT
Start: 2023-02-09 | End: 2023-02-09 | Stop reason: HOSPADM

## 2023-02-09 RX ORDER — MEPERIDINE HYDROCHLORIDE 25 MG/ML
25 INJECTION INTRAMUSCULAR; INTRAVENOUS; SUBCUTANEOUS ONCE
Status: COMPLETED | OUTPATIENT
Start: 2023-02-09 | End: 2023-02-09

## 2023-02-09 RX ORDER — MIDAZOLAM HYDROCHLORIDE 1 MG/ML
INJECTION INTRAMUSCULAR; INTRAVENOUS PRN
Status: DISCONTINUED | OUTPATIENT
Start: 2023-02-09 | End: 2023-02-09 | Stop reason: SDUPTHER

## 2023-02-09 RX ORDER — SODIUM CHLORIDE, SODIUM LACTATE, POTASSIUM CHLORIDE, CALCIUM CHLORIDE 600; 310; 30; 20 MG/100ML; MG/100ML; MG/100ML; MG/100ML
INJECTION, SOLUTION INTRAVENOUS CONTINUOUS PRN
Status: DISCONTINUED | OUTPATIENT
Start: 2023-02-09 | End: 2023-02-09 | Stop reason: SDUPTHER

## 2023-02-09 RX ORDER — SODIUM CHLORIDE 0.9 % (FLUSH) 0.9 %
5-40 SYRINGE (ML) INJECTION PRN
Status: CANCELLED | OUTPATIENT
Start: 2023-02-09

## 2023-02-09 RX ORDER — SODIUM CHLORIDE 0.9 % (FLUSH) 0.9 %
5-40 SYRINGE (ML) INJECTION PRN
Status: DISCONTINUED | OUTPATIENT
Start: 2023-02-09 | End: 2023-02-09 | Stop reason: HOSPADM

## 2023-02-09 RX ORDER — SUCCINYLCHOLINE/SOD CL,ISO/PF 200MG/10ML
SYRINGE (ML) INTRAVENOUS PRN
Status: DISCONTINUED | OUTPATIENT
Start: 2023-02-09 | End: 2023-02-09 | Stop reason: SDUPTHER

## 2023-02-09 RX ORDER — SODIUM CHLORIDE 9 MG/ML
INJECTION, SOLUTION INTRAVENOUS CONTINUOUS
Status: DISCONTINUED | OUTPATIENT
Start: 2023-02-09 | End: 2023-02-09 | Stop reason: HOSPADM

## 2023-02-09 RX ORDER — OXYCODONE HYDROCHLORIDE 5 MG/1
10 TABLET ORAL EVERY 4 HOURS PRN
Status: DISCONTINUED | OUTPATIENT
Start: 2023-02-09 | End: 2023-02-09 | Stop reason: HOSPADM

## 2023-02-09 RX ORDER — OXYCODONE HYDROCHLORIDE 5 MG/1
5 TABLET ORAL EVERY 6 HOURS PRN
Qty: 28 TABLET | Refills: 0 | Status: SHIPPED | OUTPATIENT
Start: 2023-02-09 | End: 2023-02-16

## 2023-02-09 RX ORDER — ONDANSETRON 2 MG/ML
4 INJECTION INTRAMUSCULAR; INTRAVENOUS
Status: CANCELLED | OUTPATIENT
Start: 2023-02-09 | End: 2023-02-10

## 2023-02-09 RX ORDER — SODIUM CHLORIDE 9 MG/ML
INJECTION, SOLUTION INTRAVENOUS PRN
Status: DISCONTINUED | OUTPATIENT
Start: 2023-02-09 | End: 2023-02-09 | Stop reason: HOSPADM

## 2023-02-09 RX ORDER — ONDANSETRON 2 MG/ML
INJECTION INTRAMUSCULAR; INTRAVENOUS PRN
Status: DISCONTINUED | OUTPATIENT
Start: 2023-02-09 | End: 2023-02-09 | Stop reason: SDUPTHER

## 2023-02-09 RX ORDER — OXYCODONE HYDROCHLORIDE 5 MG/1
5 TABLET ORAL EVERY 4 HOURS PRN
Status: DISCONTINUED | OUTPATIENT
Start: 2023-02-09 | End: 2023-02-09 | Stop reason: HOSPADM

## 2023-02-09 RX ORDER — ROCURONIUM BROMIDE 10 MG/ML
INJECTION, SOLUTION INTRAVENOUS PRN
Status: DISCONTINUED | OUTPATIENT
Start: 2023-02-09 | End: 2023-02-09 | Stop reason: SDUPTHER

## 2023-02-09 RX ORDER — HYDRALAZINE HYDROCHLORIDE 20 MG/ML
10 INJECTION INTRAMUSCULAR; INTRAVENOUS
Status: CANCELLED | OUTPATIENT
Start: 2023-02-09

## 2023-02-09 RX ORDER — MEPERIDINE HYDROCHLORIDE 25 MG/ML
INJECTION INTRAMUSCULAR; INTRAVENOUS; SUBCUTANEOUS
Status: COMPLETED
Start: 2023-02-09 | End: 2023-02-09

## 2023-02-09 RX ORDER — SODIUM CHLORIDE 9 MG/ML
INJECTION, SOLUTION INTRAVENOUS PRN
Status: CANCELLED | OUTPATIENT
Start: 2023-02-09

## 2023-02-09 RX ORDER — SCOLOPAMINE TRANSDERMAL SYSTEM 1 MG/1
1 PATCH, EXTENDED RELEASE TRANSDERMAL ONCE
Status: DISCONTINUED | OUTPATIENT
Start: 2023-02-09 | End: 2023-02-09 | Stop reason: HOSPADM

## 2023-02-09 RX ORDER — KETOROLAC TROMETHAMINE 30 MG/ML
INJECTION, SOLUTION INTRAMUSCULAR; INTRAVENOUS PRN
Status: DISCONTINUED | OUTPATIENT
Start: 2023-02-09 | End: 2023-02-09 | Stop reason: SDUPTHER

## 2023-02-09 RX ORDER — SODIUM CHLORIDE 0.9 % (FLUSH) 0.9 %
5-40 SYRINGE (ML) INJECTION EVERY 12 HOURS SCHEDULED
Status: CANCELLED | OUTPATIENT
Start: 2023-02-09

## 2023-02-09 RX ORDER — FENTANYL CITRATE 50 UG/ML
INJECTION, SOLUTION INTRAMUSCULAR; INTRAVENOUS PRN
Status: DISCONTINUED | OUTPATIENT
Start: 2023-02-09 | End: 2023-02-09 | Stop reason: SDUPTHER

## 2023-02-09 RX ADMIN — PROPOFOL 150 MG: 10 INJECTION, EMULSION INTRAVENOUS at 07:45

## 2023-02-09 RX ADMIN — HYDROMORPHONE HYDROCHLORIDE 0.5 MG: 1 INJECTION, SOLUTION INTRAMUSCULAR; INTRAVENOUS; SUBCUTANEOUS at 09:30

## 2023-02-09 RX ADMIN — HYDROMORPHONE HYDROCHLORIDE 0.5 MG: 1 INJECTION, SOLUTION INTRAMUSCULAR; INTRAVENOUS; SUBCUTANEOUS at 09:35

## 2023-02-09 RX ADMIN — SUGAMMADEX 200 MG: 100 INJECTION, SOLUTION INTRAVENOUS at 09:18

## 2023-02-09 RX ADMIN — OXYCODONE 10 MG: 5 TABLET ORAL at 11:20

## 2023-02-09 RX ADMIN — Medication 160 MG: at 07:45

## 2023-02-09 RX ADMIN — KETOROLAC TROMETHAMINE 30 MG: 30 INJECTION, SOLUTION INTRAMUSCULAR; INTRAVENOUS at 09:13

## 2023-02-09 RX ADMIN — SODIUM CHLORIDE: 9 INJECTION, SOLUTION INTRAVENOUS at 07:19

## 2023-02-09 RX ADMIN — FENTANYL CITRATE 50 MCG: 50 INJECTION, SOLUTION INTRAMUSCULAR; INTRAVENOUS at 08:21

## 2023-02-09 RX ADMIN — MIDAZOLAM 2 MG: 1 INJECTION INTRAMUSCULAR; INTRAVENOUS at 07:38

## 2023-02-09 RX ADMIN — ROCURONIUM BROMIDE 20 MG: 10 SOLUTION INTRAVENOUS at 08:22

## 2023-02-09 RX ADMIN — ONDANSETRON 4 MG: 2 INJECTION INTRAMUSCULAR; INTRAVENOUS at 09:13

## 2023-02-09 RX ADMIN — ROCURONIUM BROMIDE 30 MG: 10 SOLUTION INTRAVENOUS at 07:56

## 2023-02-09 RX ADMIN — SODIUM CHLORIDE, POTASSIUM CHLORIDE, SODIUM LACTATE AND CALCIUM CHLORIDE: 600; 310; 30; 20 INJECTION, SOLUTION INTRAVENOUS at 08:35

## 2023-02-09 RX ADMIN — FENTANYL CITRATE 50 MCG: 50 INJECTION, SOLUTION INTRAMUSCULAR; INTRAVENOUS at 08:08

## 2023-02-09 RX ADMIN — LIDOCAINE HYDROCHLORIDE 100 MG: 20 INJECTION INTRAVENOUS at 07:45

## 2023-02-09 RX ADMIN — FENTANYL CITRATE 50 MCG: 50 INJECTION, SOLUTION INTRAMUSCULAR; INTRAVENOUS at 09:01

## 2023-02-09 RX ADMIN — MEPERIDINE HYDROCHLORIDE 25 MG: 25 INJECTION, SOLUTION INTRAMUSCULAR; INTRAVENOUS; SUBCUTANEOUS at 09:40

## 2023-02-09 RX ADMIN — MEPERIDINE HYDROCHLORIDE 25 MG: 25 INJECTION INTRAMUSCULAR; INTRAVENOUS; SUBCUTANEOUS at 09:40

## 2023-02-09 RX ADMIN — Medication 2000 MG: at 07:53

## 2023-02-09 RX ADMIN — FENTANYL CITRATE 50 MCG: 50 INJECTION, SOLUTION INTRAMUSCULAR; INTRAVENOUS at 08:10

## 2023-02-09 RX ADMIN — FENTANYL CITRATE 100 MCG: 50 INJECTION, SOLUTION INTRAMUSCULAR; INTRAVENOUS at 07:45

## 2023-02-09 RX ADMIN — DEXAMETHASONE SODIUM PHOSPHATE 10 MG: 10 INJECTION, EMULSION INTRAMUSCULAR; INTRAVENOUS at 08:11

## 2023-02-09 ASSESSMENT — PAIN SCALES - GENERAL
PAINLEVEL_OUTOF10: 8
PAINLEVEL_OUTOF10: 10
PAINLEVEL_OUTOF10: 8
PAINLEVEL_OUTOF10: 8

## 2023-02-09 ASSESSMENT — PAIN - FUNCTIONAL ASSESSMENT: PAIN_FUNCTIONAL_ASSESSMENT: 0-10

## 2023-02-09 ASSESSMENT — PAIN DESCRIPTION - DESCRIPTORS
DESCRIPTORS: STABBING;SHARP;CRAMPING
DESCRIPTORS: THROBBING;CRAMPING

## 2023-02-09 ASSESSMENT — PAIN DESCRIPTION - FREQUENCY
FREQUENCY: CONTINUOUS
FREQUENCY: CONTINUOUS

## 2023-02-09 ASSESSMENT — PAIN DESCRIPTION - LOCATION
LOCATION: ABDOMEN

## 2023-02-09 ASSESSMENT — PAIN DESCRIPTION - PAIN TYPE
TYPE: SURGICAL PAIN
TYPE: SURGICAL PAIN

## 2023-02-09 ASSESSMENT — PAIN DESCRIPTION - ORIENTATION
ORIENTATION: LEFT;RIGHT

## 2023-02-09 ASSESSMENT — PAIN DESCRIPTION - ONSET
ONSET: ON-GOING
ONSET: ON-GOING

## 2023-02-09 ASSESSMENT — LIFESTYLE VARIABLES: SMOKING_STATUS: 1

## 2023-02-09 NOTE — DISCHARGE INSTRUCTIONS
DISCHARGE INSTRUCTIONS FOR  PATIENTS AND FAMILY  [facility] Center[facility]AD[facility]OD[facility] AM JORGERASHEED MIKE.ALEJANDRA  [phone]  [address] [facility] [address]  [address], [address]      Discharge Instructions for [facility] may shower 2 days after surgery. You may bathe/soak in water/swim in 2 weeks. Keep your incision sites clean. Wash your hands before changing the dressing. Do not douche or put anything in your vagina, such as a tampon, until your doctor tells you otherwise. NO INTERCOURSE UNTIL YOU ARE TOLD OTHERWISE. Diet    While in the hospital, you will progress from intravenous fluid to a normal diet. Eat a high-fiber diet to promote healing and prevent constipation . Drink plenty of fluids. Physical Activity    Ask your doctor when you will be able to return to work. You may drive 2 weeks after surgery if you are off narcotics for pain. Return to your normal activities gradually. Most normal activities, including sex, can be resumed in about six weeks. Take daily walks as tolerated. Avoid heavy lifting and strenuous exercise until your doctor gives you permission to do so, usually 4-6 weeks. If you are discharged with a catheter, you will be instructed on home care and when/how to remove it. BE SURE ALL OF YOUR QUESTIONS ARE ANSWERED BEFORE DISCHARGE. Medications    If you had to stop medicines before the procedure, ask your doctor when you can start again. Medicines often stopped include:   Anti-inflammatory drugs (eg, aspirin )   Blood thinners, like clopidogrel (Plavix) or warfarin (Coumadin)   You will likely go home with a prescription for pain medicine. If you had your ovaries removed with your uterus, you may be given an estrogen supplement. Also, to prevent constipation, your doctor may recommend a stool softener. If you are taking medicines, follow these general guidelines:   Take your medicine as directed. Do not change the amount or the schedule.    Do not stop taking them without talking to your doctor. Do not share them. Plan ahead for refills so you do not run out. Lifestyle Changes    You and your doctor will plan lifestyle changes that will aid in your recovery. Some issues that may affect you include: You will no longer have monthly periods, and you can no longer get pregnant. Birth control is not necessary. If your ovaries are removed, you may experience menopausal symptoms, which can be controlled with medicine. You may have a change in your sexual response. If your uterus has been removed, uterine contractions you may have felt during orgasm will no longer occur. If the ovaries have been removed, vaginal dryness may be a problem. Estrogen can help relieve this. You may enjoy sex more because you no longer feel pain from the condition. If you experience a sense of loss or feel depressed after your surgery, it is important to talk to your doctor about these feelings since they can be treated. Follow-up   Schedule a follow-up appointment as directed by your doctor. If you have had a subtotal hysterectomy (meaning you still have your cervix), continue to have regular Pap smears . If you had this procedure because of cancer, other treatment, such as radiation or hormonal therapy, may be used as well. Call Your Doctor If Any of the Following Occurs   It is important for you to monitor your recovery once you leave the hospital. That way, you can alert your doctor to any problems immediately.  If any of the following occurs, call your doctor:   Signs of infection, including fever and chills   Redness, swelling, increasing pain, excessive bleeding, leakage, or any discharge from the incision site   Incision opens up   Nausea and/or vomiting that you cannot control with the medicines you were given after surgery, or which persist for more than two days after discharge from the hospital   Dizziness or fainting   Cough, shortness of breath, or chest pain Heavy bleeding   Pain that you cannot control with the medicines you have been given   Pain, burning, urgency or frequency of urination, or persistent bleeding in the urine   Swelling, redness, or pain in your leg   In case of an emergency,  CALL 911  immediately.    Tiarra Coles MD 2/9/2023 9:32 AM

## 2023-02-09 NOTE — ANESTHESIA POSTPROCEDURE EVALUATION
Department of Anesthesiology  Postprocedure Note    Patient: Alex Colmenares  MRN: [id number]  YOB: 1980  Date of evaluation: 2/9/2023      Procedure Summary     Date: 02/09/23 Room / Location: [address] [place]    Anesthesia Start: 6124 Anesthesia Stop: 8382    Procedure: ROBOTIC HYSTERECTOMY CYSTOSCOPY (Uterus) Diagnosis:       Menorrhagia with regular cycle      (Menorrhagia with regular cycle [N92.0])    Surgeons: Claudene Pugh, MD Responsible Provider: Cinthia Esposito MD    Anesthesia Type: general ASA Status: 3          Anesthesia Type: No value filed.     Humza Phase I: Humza Score: 9    Humza Phase II: Humza Score: 9      Anesthesia Post Evaluation    Patient location during evaluation: PACU  Patient participation: complete - patient participated  Level of consciousness: awake and alert  Airway patency: patent  Nausea & Vomiting: no nausea  Complications: no  Cardiovascular status: blood pressure returned to baseline and hemodynamically stable  Respiratory status: acceptable and spontaneous ventilation  Hydration status: euvolemic

## 2023-02-09 NOTE — OP NOTE
[address] [id number]                                OPERATIVE REPORT    PATIENT NAME: Paula Garner               :        1980  MED REC NO:   [id number]                           ROOM:  ACCOUNT NO:   [de-identified]                           ADMIT DATE: 2023  PROVIDER:     Edy Oneill M.D.    DATE OF PROCEDURE:  2023    PREOPERATIVE DIAGNOSIS:  Menorrhagia. POSTOPERATIVE DIAGNOSIS:  Menorrhagia. PROCEDURES:  Robotic hysterectomy, cystoscopy,    SURGEON:  Edy Oneill MD    ANESTHESIA:  General surgery. EBL:  25 mL. COMPLICATIONS:  None. SPECIMENS:  Uterus and cervix. FINDINGS:  Small uterus and she is status post bilateral salpingectomy. Normal-appearing ovaries. DESCRIPTION OF PROCEDURE:  The patient was taken back to the operating  room where general anesthesia was obtained without difficulty. She was  then placed in the dorsal lithotomy position, prepared and draped in  normal sterile fashion. A weighted speculum was placed in the vagina. [place]  was used to retract the anterior vaginal wall. Single-tooth  tenaculum was used to grasp the anterior lip of the cervix. 0 Vicryl  stay sutures were placed in both the anterior and posterior lip of the  cervix. The uterus was then sounded and noted to be about 7-8 cm. The  uterus was then serially dilated. A medium VCare device was selected. This was introduced into the uterus. The balloon was inflated. The  green cup was tied to the cervix with the stay sutures and then the blue  cup was moved adjacent to the green cup and adequate uterine mobility  was noted. A Koehler catheter was then placed without difficulty. Her  legs were then lowered. A change of gloves occurred and attention was  turned to the abdominal portion of the procedure.   A supraumbilical  incision was made and a blind entry into the abdominal cavity was  performed with a robotic port. Entry into the abdominal cavity was  confirmed visually. Insufflation occurred under direct visualization as  well. Robotic ports were placed about 10 cm lateral to the camera port. Both of those were placed under direct visualization and an assistant  port was placed in the left upper quadrant as well under direct  visualization. The patient was then placed into steep Trendelenburg. The robot was docked. In the left arm was a long bipolar grasper and in  the right arm was monopolar scissors and with that I retired to the  robotic console. Uterus was elevated. Inspection of the pelvis  revealed a normal-appearing uterus, although small in size. She is  status post bilateral salpingectomy and both ovaries appeared to be  normal.  The round ligament on the left side was then grasped,  coagulated and cut. The posterior leaf of the broad ligament was  incised down to the level of the uterosacral ligament. The anterior  leaf of the broad ligament was incised to start to create the bladder  flap. Attention was then turned to the other side where the round  ligament was grasped, coagulated and cut. The posterior leaf of the  broad ligament was incised down to the level uterosacral ligament. The  anterior leaf of the broad ligament was incised and came across the  anterior portion of the uterus in order to create the bladder flap until  both incisions were met up. The bladder was then dissected off the  anterior portion of the uterus on the right side. The uterine artery  was grasped, coagulated and cut and on the opposite side. The uterine  artery was grasped, coagulated and cut. The posterior colpotomy  incision was made until the green cup was identified. The green cup was  identified. It was followed circumferentially around the entire cervix. Once the uterus and cervix were ligated, it was able to be removed from  the pelvis without difficulty.   The colpotomy incision was closed using  a V-Loc suture starting on the right side running it to left side and  run locking it two sutures back towards midline. There was one flap  bleeding on the anterior left side of the cuff. This was coagulated and  adequate hemostasis was noted. So with that, a cystoscopy was performed  using normal saline. No obvious injuries to the bladder were noted. Both ureters were identified and found to be spilling urine. So with  that, then all instruments removed from the patient. Skin incisions  were closed using 4-0 Vicryl. Overall, the patient tolerated the  procedure well. Sponge and needle counts correct x2. She was taken to  recovery room in stable condition. She did receive Ancef prior to  surgery and did have SCDs on and running the entire time.         Jose Garcia M.D.    D: 02/09/2023 9:30:52       T: 02/09/2023 9:33:27     [id number]  Job#: [id number]     Doc#: [id number]    CC:

## 2023-02-09 NOTE — BRIEF OP NOTE
Brief Postoperative Note      Patient: Marion Fields  YOB: 1980  MRN: [id number]    Date of Procedure: 2/9/2023    Pre-Op Diagnosis: Menorrhagia with regular cycle [N92.0]    Post-Op Diagnosis: Same       Procedure(s):  ROBOTIC HYSTERECTOMY CYSTOSCOPY    Surgeon(s):  Renata Gibbons MD    Assistant:  First Assistant: Renita Clay RN    Anesthesia: General    Estimated Blood Loss (mL): 44IN    Complications: None    Specimens:   ID Type Source Tests Collected by Time Destination   A : UTERUS AND CERVIX Tissue Uterus SURGICAL PATHOLOGY Renata Gibbons MD 2/9/2023 5980        Implants:  * No implants in log *      Drains: * No LDAs found *    Findings: Small uterus, s/p bilateral salpingectomy . normal appearing ovaries    Op note: [id number]    Electronically signed by Renata Gibbons MD on 2/9/2023 at 9:24 AM

## 2023-02-09 NOTE — DISCHARGE SUMMARY
GYN Surgery  Discharge Summary     Patient ID:  Ghazala Plummer  [id number]  71 y.o.  1980    Admit date: 2/9/2023    Admitting Physician: Ana Collier MD    Discharge Diagnoses: Menorrhagia with regular cycle [N92.0]    Discharged Condition: good      Procedures Performed: Robotic hysterectomy cystoscopy    Hospital Course: Patient was admitted on the day of surgery, and underwent an uncomplicated procedure. See dictated op note. Disposition: home    Patient Instructions:    Activity: activity as tolerated, no sex for 6 weeks, no driving while on analgesics, and no heavy lifting for 6 weeks  Diet: regular  Wound Care: as directed    Discharge Medication:      Medication List        ASK your doctor about these medications      acetaminophen 500 MG tablet  Commonly known as: APAP Extra Strength  Take 1 tablet by mouth every 6 hours as needed for Pain     ARIPiprazole 15 MG tablet  Commonly known as: ABILIFY     buPROPion 300 MG extended release tablet  Commonly known as: WELLBUTRIN XL  Take 1 tablet by mouth every morning     busPIRone 15 MG tablet  Commonly known as: BUSPAR     clonazePAM 0.5 MG tablet  Commonly known as: KLONOPIN     levothyroxine 50 MCG tablet  Commonly known as: Euthyrox  Take 1 tablet by mouth once daily     prazosin 1 MG capsule  Commonly known as: MINIPRESS  Take 3 capsules by mouth nightly     Symbicort 160-4.5 MCG/ACT Aero  Generic drug: budesonide-formoterol  Inhale 2 puffs into the lungs 2 times daily     topiramate 100 MG tablet  Commonly known as: TOPAMAX     traZODone 100 MG tablet  Commonly known as: DESYREL     venlafaxine 150 MG extended release capsule  Commonly known as: EFFEXOR XR               Discharge Date: 2/9/23    Condition: Good    Follow-up with Ana Collier MD in 1 week    Signed:  Electronically signed by Ana Collier MD on 2/9/2023 at 9:34 AM

## 2023-02-09 NOTE — H&P
[facility]  History and Physicial      Patient:  Alban Sheldon  YOB: 1980  MRN: [id number]     Acct: [de-identified]    HISTORY OF PRESENT ILLNESS:     Alban Sheldon is a 36 y.o. female No obstetric history on file. With menorrhagia. She tried to have an ablation however the uterus was too small for the device to operate. Obstetric History: No obstetric history on file. Past Medical History:        Diagnosis Date    Abdominal pain, right upper quadrant     Acute cystitis 11/05/2016    Allergic rhinitis     Anxiety     Arthritis     CAD (coronary artery disease)     Depression (emotion)     DUB (dysfunctional uterine bleeding)     Generalized anxiety disorder     GERD (gastroesophageal reflux disease)     Hypertriglyceridemia 11/11/2014    Hypoglycemia     Hypothyroid 08/10/2014    Movement disorder     Pancreatitis 11/10/2014    PONV (postoperative nausea and vomiting)     Psychiatric problem     Scoliosis     Tobacco abuse        Past Surgical History:        Procedure Laterality Date    COLONOSCOPY N/A 7/27/2019    COLONOSCOPY WITH BIOPSY performed by Sarah Gray MD at [address] [place] [address] Bilateral 8/18/2022    Hysteroscopy D&C Laparoscopic Bilateral Salpingectomy Nexplanon Removal performed by Harrison Medeiros MD at [address] [place] [address] Right 08/2017    KNEE SURGERY Right 02/11/2020       Medications: Scheduled Meds:   sodium chloride flush  5-40 mL IntraVENous 2 times per day    ceFAZolin (ANCEF) IVPB  2,000 mg IntraVENous On Call to OR    scopolamine  1 patch TransDERmal Once     Continuous Infusions:   sodium chloride 125 mL/hr at 02/09/23 0719    sodium chloride       PRN Meds:.sodium chloride flush, sodium chloride    Allergies:  Bee pollen    Social History:    reports that she has been smoking cigarettes. She started smoking about 25 years ago.  She has a 22.00 pack-year smoking history. She has never used smokeless tobacco. She reports that she does not currently use alcohol. She reports that she does not use drugs.     Family History:       Problem Relation Age of Onset    Diabetes Mother     High Blood Pressure Mother     Thyroid Disease Mother     Heart Disease Father     Diabetes Father     Cancer Sister         cervical       Review of Systems:  General ROS: negative  Respiratory ROS: negative  Cardiovascular ROS: negative  Gastrointestinal ROS: negative  Musculoskeletal ROS: negative  Neurological ROS: negative    Physical Exam:    Vitals:Patient Vitals for the past 24 hrs:   BP Temp Temp src Pulse Resp SpO2 Height Weight   02/09/23 0654 122/80 96.8 °F (36 °C) Temporal 71 16 95 % 5' 6\" (1.676 m) 241 lb 3.2 oz (109.4 kg)          Wt Readings from Last 1 Encounters:   02/09/23 241 lb 3.2 oz (109.4 kg)         General appearance - alert, well appearing, and in no distress  Abdomen - soft, nontender, nondistended, no masses or organomegaly  Pelvic - deferred  Neurological - alert, oriented, normal speech, no focal findings or movement disorder noted  Musculoskeletal - no joint tenderness, deformity or swelling  Extremities - peripheral pulses normal, no pedal edema, no clubbing or cyanosis  Skin - normal coloration and turgor, no rashes, no suspicious skin lesions noted      Review of Labs and Diagnostic Testing:      CBC:   Lab Results   Component Value Date/Time    WBC 5.9 02/06/2023 11:10 AM    RBC 4.65 02/06/2023 11:10 AM    RBC 4.40 04/05/2012 10:34 AM    HGB 13.7 02/06/2023 11:10 AM    HCT 42.8 02/06/2023 11:10 AM    MCV 92.0 02/06/2023 11:10 AM    RDW 13.3 05/31/2022 10:43 AM     02/06/2023 11:10 AM         Radiology:        Assessment:    Patient Active Problem List   Diagnosis    Scoliosis deformity of spine    Hypothyroidism    Obesity with body mass index (BMI) of 30.0 to 39.9    Tobacco abuse    Intermittent palpitations    PTSD (post-traumatic stress disorder)    Panic disorder with agoraphobia    TANMAY (generalized anxiety disorder)    Severe episode of recurrent major depressive disorder, without psychotic features ([address]    Moderate persistent reactive airway disease without complication    Menorrhagia    S/P laparoscopy         Plan:   1.Robotic hysterectomy cystoscopy      Electronically signed by Ochoa Watters MD on 2/9/2023 at 7:22 AM

## 2023-02-09 NOTE — PROGRESS NOTES

## 2023-02-09 NOTE — ANESTHESIA PRE PROCEDURE
Department of Anesthesiology  Preprocedure Note       Name:  Terese Pinzon   Age:  36 y.o.  :  1980                                          MRN:  [id number]         Date:  2023      Surgeon: oBo Garay):  Shanique Batista MD    Procedure: Procedure(s):  ROBOTIC HYSTERECTOMY CYSTOSCOPY    Medications prior to admission:   Prior to Admission medications    Medication Sig Start Date End Date Taking? Authorizing Provider   topiramate (TOPAMAX) 100 MG tablet Take 100 mg by mouth nightly 22   Historical Provider, MD   clonazePAM (KLONOPIN) 0.5 MG tablet Take 0.5 mg by mouth 3 times daily. Patient not taking: Reported on 2023 8/3/22   Historical Provider, MD   busPIRone (BUSPAR) 15 MG tablet Take 15 mg by mouth 2 times daily 22   Historical Provider, MD   levothyroxine (EUTHYROX) 50 MCG tablet Take 1 tablet by mouth once daily  Patient not taking: Reported on 2023   Andre Wyatt MD   venlafaxine (EFFEXOR XR) 150 MG extended release capsule Take 150 mg by mouth daily 22   Historical Provider, MD   ARIPiprazole (ABILIFY) 15 MG tablet Take 15 mg by mouth daily  Patient not taking: No sig reported 22   Historical Provider, MD   traZODone (DESYREL) 100 MG tablet Take 100 mg by mouth nightly 21   Historical Provider, MD   SYMBICORT 160-4.5 MCG/ACT AERO Inhale 2 puffs into the lungs 2 times daily  Patient not taking: Reported on 2023 6/3/21   Eloina Lynn DO   prazosin (MINIPRESS) 1 MG capsule Take 3 capsules by mouth nightly 3/31/21 5/31/22  SHANA Hair - CNP   buPROPion (WELLBUTRIN XL) 300 MG extended release tablet Take 1 tablet by mouth every morning 20   Yoselin Rocha MD   acetaminophen (APAP EXTRA STRENGTH) 500 MG tablet Take 1 tablet by mouth every 6 hours as needed for Pain 10/23/17   Victorino Ruvalcaba PA-C       Current medications:    No current facility-administered medications for this visit.      No current outpatient medications on file.     Facility-Administered Medications Ordered in Other Visits   Medication Dose Route Frequency Provider Last Rate Last Admin    0.9 % sodium chloride infusion   IntraVENous Continuous Harrison Medeiros MD        sodium chloride flush 0.9 % injection 5-40 mL  5-40 mL IntraVENous 2 times per day Harrison Medeiros MD        sodium chloride flush 0.9 % injection 5-40 mL  5-40 mL IntraVENous PRN Harrison Medeiros MD        0.9 % sodium chloride infusion   IntraVENous PRN Harrison Medeiros MD        ceFAZolin (ANCEF) 2000 mg in 0.9% sodium chloride 50 mL IVPB  2,000 mg IntraVENous On Call to Ford Corbett MD           Allergies:     Allergies   Allergen Reactions    Bee Pollen Swelling     Bee stings - extreme swelling at site       Problem List:    Patient Active Problem List   Diagnosis Code    Scoliosis deformity of spine M41.9    Hypothyroidism E03.9    Obesity with body mass index (BMI) of 30.0 to 39.9 E66.9    Tobacco abuse Z72.0    Intermittent palpitations R00.2    PTSD (post-traumatic stress disorder) F43.10    Panic disorder with agoraphobia F40.01    TANMAY (generalized anxiety disorder) F41.1    Severe episode of recurrent major depressive disorder, without psychotic features (HCC) F33.2    Moderate persistent reactive airway disease without complication H25.75    Menorrhagia N92.0    S/P laparoscopy O29.814       Past Medical History:        Diagnosis Date    Abdominal pain, right upper quadrant     Acute cystitis 11/05/2016    Allergic rhinitis     Anxiety     Arthritis     CAD (coronary artery disease)     Depression (emotion)     DUB (dysfunctional uterine bleeding)     Generalized anxiety disorder     GERD (gastroesophageal reflux disease)     Hypertriglyceridemia 11/11/2014    Hypoglycemia     Hypothyroid 08/10/2014    Movement disorder     Pancreatitis 11/10/2014    PONV (postoperative nausea and vomiting)     Psychiatric problem     Scoliosis     Tobacco abuse Past Surgical History:        Procedure Laterality Date    COLONOSCOPY N/A 7/27/2019    COLONOSCOPY WITH BIOPSY performed by Andrew Levi MD at [address] Bilateral 8/18/2022    Hysteroscopy D&C Laparoscopic Bilateral Salpingectomy Nexplanon Removal performed by Kiet Crump MD at [address] [facility]      KNEE SURGERY Right 08/2017    KNEE SURGERY Right 02/11/2020       Social History:    Social History     Tobacco Use    Smoking status: Every Day     Packs/day: 1.00     Years: 22.00     Pack years: 22.00     Types: Cigarettes     Start date: 1/16/1998    Smokeless tobacco: Never   Substance Use Topics    Alcohol use: Not Currently                                Ready to quit: Not Answered  Counseling given: Not Answered      Vital Signs (Current): There were no vitals filed for this visit.                                            BP Readings from Last 3 Encounters:   02/09/23 122/80   09/03/22 108/72   08/25/22 122/62       NPO Status:                                                                                 BMI:   Wt Readings from Last 3 Encounters:   02/09/23 241 lb 3.2 oz (109.4 kg)   09/03/22 230 lb (104.3 kg)   08/25/22 238 lb 6.4 oz (108.1 kg)     There is no height or weight on file to calculate BMI.    CBC:   Lab Results   Component Value Date/Time    WBC 5.9 02/06/2023 11:10 AM    RBC 4.65 02/06/2023 11:10 AM    RBC 4.40 04/05/2012 10:34 AM    HGB 13.7 02/06/2023 11:10 AM    HCT 42.8 02/06/2023 11:10 AM    MCV 92.0 02/06/2023 11:10 AM    RDW 13.3 05/31/2022 10:43 AM     02/06/2023 11:10 AM       CMP:   Lab Results   Component Value Date/Time     06/01/2022 09:00 PM    K 4.2 06/01/2022 09:00 PM    K 3.7 07/27/2019 05:56 AM     06/01/2022 09:00 PM    CO2 23 06/01/2022 09:00 PM    BUN 13 06/01/2022 09:00 PM    CREATININE 0.8 06/01/2022 09:00 PM    LABGLOM 78 06/01/2022 09:00 PM    GLUCOSE 87 06/01/2022 09:00 PM    GLUCOSE 79 04/05/2012 10:34 AM    PROT 7.2 11/23/2021 09:38 AM    CALCIUM 8.6 06/01/2022 09:00 PM    BILITOT 0.3 11/23/2021 09:38 AM    UHBYHZO 91 11/23/2021 09:38 AM    AST 13 11/23/2021 09:38 AM    ALT 9 11/23/2021 09:38 AM       POC Tests: No results for input(s): POCGLU, POCNA, POCK, POCCL, POCBUN, POCHEMO, POCHCT in the last 72 hours. Coags:   Lab Results   Component Value Date/Time    INR 0.98 07/26/2019 01:15 AM    APTT 34.3 07/26/2019 01:15 AM       HCG (If Applicable):   Lab Results   Component Value Date    PREGTESTUR NEGATIVE 12/28/2018    PREGSERUM NEGATIVE 02/06/2023        ABGs: No results found for: PHART, PO2ART, TUO6LNY, AMI5AWN, BEART, R4ACQLYT     Type & Screen (If Applicable):  Lab Results   Component Value Date    LABRH POS 02/06/2023       Drug/Infectious Status (If Applicable):  Lab Results   Component Value Date/Time    HEPCAB NEGATIVE 02/15/2012 09:20 PM       COVID-19 Screening (If Applicable):   Lab Results   Component Value Date/Time    COVID19 NOT  DETECTED 09/03/2022 08:23 AM           Anesthesia Evaluation  Patient summary reviewed and Nursing notes reviewed   history of anesthetic complications: PONV. Airway: Mallampati: II  TM distance: >3 FB   Neck ROM: full  Mouth opening: > = 3 FB   Dental:          Pulmonary:normal exam  breath sounds clear to auscultation  (+) current smoker          Patient did not smoke on day of surgery. Cardiovascular:    (+) CAD:,                   Neuro/Psych:   (+) psychiatric history:            GI/Hepatic/Renal:   (+) GERD:, morbid obesity          Endo/Other:    (+) hypothyroidism::., .          Pt had no PAT visit       Abdominal:   (+) obese,     Abdomen: soft. Vascular: negative vascular ROS. Other Findings:             Anesthesia Plan      general     ASA 3       Induction: intravenous. MIPS: Postoperative opioids intended and Prophylactic antiemetics administered.   Anesthetic plan and risks discussed with patient. Plan discussed with CRNA.                     Mohit Carson MD   2/9/2023

## 2023-02-09 NOTE — PROGRESS NOTES
Pt returned to [facility] room 7. Vitals and assessment as charted. LR infusing, @100ml to count from PACU. Pt has pudding and pepsi. Family at the bedside. Pt and family verbalized understanding of discharge criteria and call light use. Call light in reach.

## 2023-02-09 NOTE — PROGRESS NOTES
928 Awake and oriented on arrival to PACU  ,pt crying and yelling out in pain , states pain a # 10   930 medicated with DIlaudid 0.5 mg IV  935 pain unchanged , medicated with Dilaudid 0.5 mg IV , pt shivering , giorgi hugger applied  940 shivering continues , medicated with Demerol 25 mg IV  945 shivering gone , pain a # 7 drifts off to  sleep during conversation  1000 eyes closed resp easy   1013 awakens to name , states pain a # 6 , drifts back to sleep easily   1019 meets criteria for discharge , transported to [facility]

## 2023-02-26 RX ORDER — LEVOTHYROXINE SODIUM 0.05 MG/1
TABLET ORAL
Qty: 90 TABLET | Refills: 2 | Status: CANCELLED | OUTPATIENT
Start: 2023-02-26

## 2023-02-26 RX ORDER — BUSPIRONE HYDROCHLORIDE 10 MG/1
20 TABLET ORAL 2 TIMES DAILY
COMMUNITY
Start: 2022-12-19

## 2023-02-26 RX ORDER — ARIPIPRAZOLE 20 MG/1
20 TABLET ORAL DAILY
COMMUNITY
Start: 2023-02-03
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
## 2023-03-02 ENCOUNTER — OFFICE VISIT (OUTPATIENT)
Dept: FAMILY MEDICINE CLINIC | Age: 43
End: 2023-03-02

## 2023-03-02 ENCOUNTER — NURSE ONLY (OUTPATIENT)
Dept: LAB | Age: 43
End: 2023-03-02

## 2023-03-02 VITALS
HEART RATE: 94 BPM | TEMPERATURE: 97.9 F | BODY MASS INDEX: 39.34 KG/M2 | HEIGHT: 66 IN | WEIGHT: 244.8 LBS | DIASTOLIC BLOOD PRESSURE: 78 MMHG | OXYGEN SATURATION: 98 % | SYSTOLIC BLOOD PRESSURE: 136 MMHG | RESPIRATION RATE: 16 BRPM

## 2023-03-02 DIAGNOSIS — Z90.710 S/P HYSTERECTOMY: ICD-10-CM

## 2023-03-02 DIAGNOSIS — F33.2 SEVERE EPISODE OF RECURRENT MAJOR DEPRESSIVE DISORDER, WITHOUT PSYCHOTIC FEATURES (HCC): ICD-10-CM

## 2023-03-02 DIAGNOSIS — E03.9 HYPOTHYROIDISM, UNSPECIFIED TYPE: ICD-10-CM

## 2023-03-02 DIAGNOSIS — E03.9 HYPOTHYROIDISM, UNSPECIFIED TYPE: Primary | ICD-10-CM

## 2023-03-02 LAB — TSH SERPL DL<=0.005 MIU/L-ACNC: 3.01 UIU/ML (ref 0.4–4.2)

## 2023-03-02 RX ORDER — LEVOTHYROXINE SODIUM 0.05 MG/1
TABLET ORAL
Qty: 90 TABLET | Refills: 1 | Status: SHIPPED | OUTPATIENT
Start: 2023-03-02

## 2023-03-02 SDOH — ECONOMIC STABILITY: FOOD INSECURITY: WITHIN THE PAST 12 MONTHS, YOU WORRIED THAT YOUR FOOD WOULD RUN OUT BEFORE YOU GOT MONEY TO BUY MORE.: SOMETIMES TRUE

## 2023-03-02 SDOH — ECONOMIC STABILITY: INCOME INSECURITY: HOW HARD IS IT FOR YOU TO PAY FOR THE VERY BASICS LIKE FOOD, HOUSING, MEDICAL CARE, AND HEATING?: SOMEWHAT HARD

## 2023-03-02 SDOH — ECONOMIC STABILITY: HOUSING INSECURITY
IN THE LAST 12 MONTHS, WAS THERE A TIME WHEN YOU DID NOT HAVE A STEADY PLACE TO SLEEP OR SLEPT IN A SHELTER (INCLUDING NOW)?: NO

## 2023-03-02 SDOH — ECONOMIC STABILITY: FOOD INSECURITY: WITHIN THE PAST 12 MONTHS, THE FOOD YOU BOUGHT JUST DIDN'T LAST AND YOU DIDN'T HAVE MONEY TO GET MORE.: SOMETIMES TRUE

## 2023-03-02 ASSESSMENT — PATIENT HEALTH QUESTIONNAIRE - PHQ9
SUM OF ALL RESPONSES TO PHQ QUESTIONS 1-9: 15
8. MOVING OR SPEAKING SO SLOWLY THAT OTHER PEOPLE COULD HAVE NOTICED. OR THE OPPOSITE, BEING SO FIGETY OR RESTLESS THAT YOU HAVE BEEN MOVING AROUND A LOT MORE THAN USUAL: 3
SUM OF ALL RESPONSES TO PHQ QUESTIONS 1-9: 15
9. THOUGHTS THAT YOU WOULD BE BETTER OFF DEAD, OR OF HURTING YOURSELF: 0
4. FEELING TIRED OR HAVING LITTLE ENERGY: 1
2. FEELING DOWN, DEPRESSED OR HOPELESS: 1
SUM OF ALL RESPONSES TO PHQ QUESTIONS 1-9: 15
3. TROUBLE FALLING OR STAYING ASLEEP: 3
6. FEELING BAD ABOUT YOURSELF - OR THAT YOU ARE A FAILURE OR HAVE LET YOURSELF OR YOUR FAMILY DOWN: 1
7. TROUBLE CONCENTRATING ON THINGS, SUCH AS READING THE NEWSPAPER OR WATCHING TELEVISION: 0
SUM OF ALL RESPONSES TO PHQ QUESTIONS 1-9: 15
5. POOR APPETITE OR OVEREATING: 3
10. IF YOU CHECKED OFF ANY PROBLEMS, HOW DIFFICULT HAVE THESE PROBLEMS MADE IT FOR YOU TO DO YOUR WORK, TAKE CARE OF THINGS AT HOME, OR GET ALONG WITH OTHER PEOPLE: 1
1. LITTLE INTEREST OR PLEASURE IN DOING THINGS: 3
SUM OF ALL RESPONSES TO PHQ9 QUESTIONS 1 & 2: 4

## 2023-03-02 NOTE — PROGRESS NOTES
[facility] PROFESSIONAL SERVS  [facility] MEDICINE PRACTICE  [address]  [place] [id number]  Dept: [phone]  Loc: [phone]      Please see Resident note for complete HPI.     42-year-old female with history of hypothyroidism  Has a lot of symptoms when she is off Euthyrox - went off it 3 months it.   Having cold intolerance, constipation, fatigue.  Otherwise only taking her Wellbutrin.     ROS per Resident    Lab Results   Component Value Date    WBC 5.9 02/06/2023    HGB 13.7 02/06/2023    HCT 42.8 02/06/2023    MCV 92.0 02/06/2023     02/06/2023     Lab Results   Component Value Date     06/01/2022    K 4.2 06/01/2022     06/01/2022    CO2 23 06/01/2022    BUN 13 06/01/2022    CREATININE 0.8 06/01/2022    GLUCOSE 87 06/01/2022    CALCIUM 8.6 06/01/2022    PROT 7.2 11/23/2021    LABALBU 4.3 11/23/2021    BILITOT 0.3 11/23/2021    ALKPHOS 81 11/23/2021    AST 13 11/23/2021    ALT 9 (L) 11/23/2021    LABGLOM 78 (A) 06/01/2022     Lab Results   Component Value Date    LABA1C 5.4 08/29/2019     No results found for: EAG  No results found for: LABMICR, [id number]  Lab Results   Component Value Date    TSH 1.740 05/31/2022    X9HPXLV 84 05/31/2022    T4FREE 1.11 05/31/2022     Lab Results   Component Value Date    CHOL 196 11/23/2021    CHOL 197 08/29/2019    CHOL 204 (H) 11/06/2016     Lab Results   Component Value Date    TRIG 129 11/23/2021    TRIG 189 08/29/2019    TRIG 507 (H) 01/27/2017     Lab Results   Component Value Date    HDL 50 11/23/2021    HDL 34 08/29/2019    HDL 29 11/06/2016     Lab Results   Component Value Date    LDLCALC 120 11/23/2021    LDLCALC 125 08/29/2019    LDLCALC 138 11/06/2016     No results found for: LABVLDL, VLDL  No results found for: CHOLHDLRATIO  No results found for: PSA, PSADIA    Health Maintenance Due   Topic Date Due    Pneumococcal 0-64 years Vaccine (1 - PCV) Never done    COVID-19 Vaccine (2 - Booster for Fredo  series) 05/20/2021    Flu vaccine (1) 08/01/2022         Physical Exam per Resident       ICD-10-CM    1. Hypothyroidism, unspecified type  E03.9       2. Severe episode of recurrent major depressive disorder, without psychotic features ([facility]  F33.2       3. S/P hysterectomy  Z90.710               Plan  I participated in the discussion and care of this patient     Hypothyroidism - refill Euthyrox 50 mcg PO daily, check TSH and T4.

## 2023-03-02 NOTE — PROGRESS NOTES
Health Maintenance Due   Topic Date Due    Pneumococcal 0-64 years Vaccine (1 - PCV) Never done    COVID-19 Vaccine (2 - Booster for Fredo series) 05/20/2021    Flu vaccine (1) 08/01/2022

## 2023-03-02 NOTE — RESULT ENCOUNTER NOTE
Your recent lab results are normal.  Likely would not need thyroid medications if tsh normal even off of medications.   Let us know if questions

## 2023-03-14 ENCOUNTER — HOSPITAL ENCOUNTER (EMERGENCY)
Age: 43
Discharge: HOME | End: 2023-03-14
Payer: COMMERCIAL

## 2023-03-14 VITALS
DIASTOLIC BLOOD PRESSURE: 99 MMHG | HEIGHT: 67 IN | RESPIRATION RATE: 16 BRPM | HEART RATE: 86 BPM | WEIGHT: 247 LBS | TEMPERATURE: 97.8 F | BODY MASS INDEX: 38.77 KG/M2 | OXYGEN SATURATION: 98 % | SYSTOLIC BLOOD PRESSURE: 142 MMHG

## 2023-03-14 DIAGNOSIS — L24.9 IRRITANT CONTACT DERMATITIS, UNSPECIFIED TRIGGER: Primary | ICD-10-CM

## 2023-03-14 PROCEDURE — 99213 OFFICE O/P EST LOW 20 MIN: CPT

## 2023-03-14 RX ORDER — CETIRIZINE HYDROCHLORIDE 10 MG/1
10 TABLET ORAL DAILY
Qty: 7 TABLET | Refills: 0 | Status: SHIPPED | OUTPATIENT
Start: 2023-03-14

## 2023-03-14 ASSESSMENT — ENCOUNTER SYMPTOMS: SHORTNESS OF BREATH: 0

## 2023-03-14 ASSESSMENT — PAIN SCALES - GENERAL: PAINLEVEL_OUTOF10: 3

## 2023-03-14 ASSESSMENT — PAIN - FUNCTIONAL ASSESSMENT
PAIN_FUNCTIONAL_ASSESSMENT: PREVENTS OR INTERFERES SOME ACTIVE ACTIVITIES AND ADLS
PAIN_FUNCTIONAL_ASSESSMENT: 0-10

## 2023-03-14 ASSESSMENT — PAIN DESCRIPTION - PAIN TYPE: TYPE: ACUTE PAIN

## 2023-03-14 ASSESSMENT — PAIN DESCRIPTION - DESCRIPTORS: DESCRIPTORS: BURNING

## 2023-03-14 ASSESSMENT — PAIN DESCRIPTION - LOCATION: LOCATION: ARM

## 2023-03-14 ASSESSMENT — PAIN DESCRIPTION - ORIENTATION: ORIENTATION: LEFT;LOWER

## 2023-03-14 NOTE — DISCHARGE INSTRUCTIONS
Cortisone cream 2x a day   Zyrtec daily  Benadryl as needed  Tylenol and Motrin for pain and discomfort  Follow up with family doctor 3-5 days  Return to emergency department for new or worsening symptoms

## 2023-03-14 NOTE — ED PROVIDER NOTES
[facility]       CHIEF COMPLAINT       Chief Complaint   Patient presents with    Rash     Left forearm       Nurses Notes reviewed and I agree except as noted in the HPI. HISTORY OF PRESENT ILLNESS   Chet Horta is a 36 y.o. female who presents pains of left arm rash that began on Thursday. Patient reports that she was a  at [employer] when the rash began. Patient reports that she recently was fired so she does not need a work slip at this time, but she is left-handed and that is the arm that was constantly in the water. She denies taking anything for pain or rash, and denies putting any ointment on rash. Patient reports pain 2 out of 10 at this time. HPI    REVIEW OF SYSTEMS     Review of Systems   Constitutional:  Negative for chills, fatigue and fever. Respiratory:  Negative for shortness of breath. Cardiovascular:  Negative for chest pain. Skin:  Positive for rash. All other systems reviewed and are negative. PAST MEDICAL HISTORY         Diagnosis Date    Abdominal pain, right upper quadrant     Acute cystitis 11/05/2016    Allergic rhinitis     Anxiety     Arthritis     CAD (coronary artery disease)     Depression (emotion)     DUB (dysfunctional uterine bleeding)     Generalized anxiety disorder     GERD (gastroesophageal reflux disease)     Hypertriglyceridemia 11/11/2014    Hypoglycemia     Hypothyroid 08/10/2014    Movement disorder     Pancreatitis 11/10/2014    PONV (postoperative nausea and vomiting)     Psychiatric problem     Scoliosis     Tobacco abuse        SURGICALHISTORY     Patient  has a past surgical history that includes fracture surgery; knee surgery (Right, 08/2017); Colonoscopy (N/A, 7/27/2019); knee surgery (Right, 02/11/2020); Dilation and curettage of uterus (Bilateral, 8/18/2022); and Hysterectomy (N/A, 2/9/2023).     CURRENT MEDICATIONS       Discharge Medication List as of 3/14/2023  8:29 AM        CONTINUE these medications which have NOT CHANGED    Details   levothyroxine (EUTHYROX) 50 MCG tablet Take 1 tablet by mouth once daily, Disp-90 tablet, R-1Normal      ARIPiprazole (ABILIFY) 20 MG tablet Take 20 mg by mouth dailyHistorical Med      busPIRone (BUSPAR) 10 MG tablet Take 20 mg by mouth in the morning and at bedtimeHistorical Med      topiramate (TOPAMAX) 100 MG tablet Take 100 mg by mouth nightlyHistorical Med      venlafaxine (EFFEXOR XR) 150 MG extended release capsule Take 150 mg by mouth dailyHistorical Med      traZODone (DESYREL) 100 MG tablet Take 100 mg by mouth nightlyHistorical Med      buPROPion (WELLBUTRIN XL) 300 MG extended release tablet Take 1 tablet by mouth every morning, Disp-30 tablet, R-5Normal      acetaminophen (APAP EXTRA STRENGTH) 500 MG tablet Take 1 tablet by mouth every 6 hours as needed for Pain, Disp-120 tablet, R-3Print             ALLERGIES     Patient is is allergic to bee pollen. Patients   Immunization History   Administered Date(s) Administered    COVID-19, J&J, (age 18y+), IM, 0.5 mL 03/25/2021    Influenza Virus Vaccine 10/04/2019    Tdap (Boostrix, Adacel) 01/14/2018       FAMILY HISTORY     Patient's family history includes Cancer in her sister; Diabetes in her father and mother; Heart Disease in her father; High Blood Pressure in her mother; Thyroid Disease in her mother. SOCIAL HISTORY     Patient  reports that she has been smoking cigarettes. She started smoking about 25 years ago. She has a 25.00 pack-year smoking history. She has never used smokeless tobacco. She reports that she does not currently use alcohol. She reports that she does not use drugs. PHYSICAL EXAM     ED TRIAGE VITALS  BP: (!) 142/99, Temp: 97.8 °F (36.6 °C), Heart Rate: 86, Resp: 16, SpO2: 98 %,Estimated body mass index is 38.69 kg/m² as calculated from the following:    Height as of this encounter: 5' 7\" (1.702 m).     Weight as of this encounter: 247 lb (112 kg).,Patient's last menstrual period was 02/02/2023. Physical Exam  Constitutional:       Appearance: Normal appearance. She is normal weight. HENT:      Head: Normocephalic. Eyes:      Pupils: Pupils are equal, round, and reactive to light. Cardiovascular:      Rate and Rhythm: Normal rate and regular rhythm. Heart sounds: Normal heart sounds. Pulmonary:      Effort: Pulmonary effort is normal.      Breath sounds: Normal breath sounds. Musculoskeletal:      Cervical back: Normal range of motion. Skin:     General: Skin is warm and dry. Findings: Rash present. Rash is papular. Neurological:      Mental Status: She is alert. DIAGNOSTIC RESULTS     Labs:No results found for this visit on 03/14/23. IMAGING:    No orders to display         EKG:      URGENT CARE COURSE:     Vitals:    03/14/23 0812   BP: (!) 142/99   Pulse: 86   Resp: 16   Temp: 97.8 °F (36.6 °C)   TempSrc: Temporal   SpO2: 98%   Weight: 247 lb (112 kg)   Height: 5' 7\" (1.702 m)       Medications - No data to display         PROCEDURES:  None    FINAL IMPRESSION      1. Irritant contact dermatitis, unspecified trigger          DISPOSITION/ PLAN   Diagnosed with contact dermatitis. Use cortisone cream 2x a day   Zyrtec daily  Benadryl as needed  Tylenol and Motrin for pain and discomfort  Follow up with family doctor 3-5 days  Return to emergency department for new or worsening symptoms        PATIENT REFERRED TO:  Bryce Lira MD  [facility] / [address] [facility] [id number]      DISCHARGE MEDICATIONS:  Discharge Medication List as of 3/14/2023  8:29 AM        START taking these medications    Details   hydrocortisone 2.5 % cream Apply topically 2 times daily. , Disp-3.5 g, R-0, Normal      cetirizine (ZYRTEC ALLERGY) 10 MG tablet Take 1 tablet by mouth daily, Disp-7 tablet, R-0Normal             Discharge Medication List as of 3/14/2023  8:29 AM        STOP taking these medications       prazosin (MINIPRESS) 1 MG capsule Comments:   Reason for Stopping:               Discharge Medication List as of 3/14/2023  8:29 AM          SHANA Renner CNP    (Please note that portions of this note were completed with a voice recognition program. Efforts were made to edit the dictations but occasionally words are mis-transcribed.)           SHANA Renner CNP  03/14/23 8077

## 2023-04-12 PROBLEM — K21.9 GASTROESOPHAGEAL REFLUX DISEASE WITHOUT ESOPHAGITIS: Status: ACTIVE | Noted: 2023-04-12

## 2023-08-15 ENCOUNTER — NURSE ONLY (OUTPATIENT)
Dept: LAB | Age: 43
End: 2023-08-15

## 2023-08-15 ENCOUNTER — OFFICE VISIT (OUTPATIENT)
Dept: FAMILY MEDICINE CLINIC | Age: 43
End: 2023-08-15

## 2023-08-15 ENCOUNTER — TELEPHONE (OUTPATIENT)
Dept: FAMILY MEDICINE CLINIC | Age: 43
End: 2023-08-15

## 2023-08-15 VITALS
TEMPERATURE: 98.4 F | HEIGHT: 67 IN | WEIGHT: 238.2 LBS | RESPIRATION RATE: 16 BRPM | BODY MASS INDEX: 37.39 KG/M2 | OXYGEN SATURATION: 98 % | DIASTOLIC BLOOD PRESSURE: 98 MMHG | SYSTOLIC BLOOD PRESSURE: 132 MMHG | HEART RATE: 65 BPM

## 2023-08-15 DIAGNOSIS — E03.9 HYPOTHYROIDISM, UNSPECIFIED TYPE: ICD-10-CM

## 2023-08-15 DIAGNOSIS — G62.9 PERIPHERAL POLYNEUROPATHY: ICD-10-CM

## 2023-08-15 DIAGNOSIS — R05.9 COUGH, UNSPECIFIED TYPE: ICD-10-CM

## 2023-08-15 DIAGNOSIS — Z72.0 TOBACCO ABUSE: Primary | ICD-10-CM

## 2023-08-15 DIAGNOSIS — I73.00 RAYNAUD'S PHENOMENON WITHOUT GANGRENE: ICD-10-CM

## 2023-08-15 DIAGNOSIS — R03.0 ELEVATED BLOOD PRESSURE READING: ICD-10-CM

## 2023-08-15 DIAGNOSIS — I10 HYPERTENSION, UNSPECIFIED TYPE: ICD-10-CM

## 2023-08-15 LAB
CHOLESTEROL, FASTING: 199 MG/DL (ref 100–199)
CRP SERPL-MCNC: 0.41 MG/DL (ref 0–1)
DEPRECATED MEAN GLUCOSE BLD GHB EST-ACNC: 117 MG/DL (ref 70–126)
ERYTHROCYTE [SEDIMENTATION RATE] IN BLOOD BY WESTERGREN METHOD: 5 MM/HR (ref 0–20)
HBA1C MFR BLD HPLC: 5.9 % (ref 4.4–6.4)
HDLC SERPL-MCNC: 51 MG/DL
LDLC SERPL CALC-MCNC: 127 MG/DL
TRIGLYCERIDE, FASTING: 104 MG/DL (ref 0–199)
TSH SERPL DL<=0.005 MIU/L-ACNC: 1.91 UIU/ML (ref 0.4–4.2)

## 2023-08-15 ASSESSMENT — ENCOUNTER SYMPTOMS
SHORTNESS OF BREATH: 1
GASTROINTESTINAL NEGATIVE: 1
EYES NEGATIVE: 1

## 2023-08-15 NOTE — TELEPHONE ENCOUNTER
----- Message from Tory Garza MD sent at 8/15/2023  1:09 PM EDT -----  Please call patient to have follow up in 2-4 weeks for blood pressure management.

## 2023-08-16 NOTE — TELEPHONE ENCOUNTER
Spoke with pt, informed pt of message per Dr Dannie Dahl. Scheduled follow up for 8/29/23 with Dr Bonita Bennett. Pt verbalized understanding. Dr Dannie Dahl booked until 10/12/23.

## 2023-08-17 ENCOUNTER — HOSPITAL ENCOUNTER (EMERGENCY)
Age: 43
Discharge: HOME | End: 2023-08-17
Attending: STUDENT IN AN ORGANIZED HEALTH CARE EDUCATION/TRAINING PROGRAM
Payer: COMMERCIAL

## 2023-08-17 ENCOUNTER — APPOINTMENT (OUTPATIENT)
Dept: GENERAL RADIOLOGY | Age: 43
End: 2023-08-17
Payer: COMMERCIAL

## 2023-08-17 ENCOUNTER — TELEPHONE (OUTPATIENT)
Dept: FAMILY MEDICINE CLINIC | Age: 43
End: 2023-08-17

## 2023-08-17 VITALS
OXYGEN SATURATION: 98 % | TEMPERATURE: 98.2 F | SYSTOLIC BLOOD PRESSURE: 131 MMHG | RESPIRATION RATE: 18 BRPM | DIASTOLIC BLOOD PRESSURE: 85 MMHG | HEART RATE: 60 BPM

## 2023-08-17 DIAGNOSIS — R07.9 CHEST PAIN, UNSPECIFIED TYPE: ICD-10-CM

## 2023-08-17 DIAGNOSIS — I10 HYPERTENSION, UNSPECIFIED TYPE: Primary | ICD-10-CM

## 2023-08-17 LAB
ANION GAP SERPL CALC-SCNC: 13 MEQ/L (ref 8–16)
BASOPHILS ABSOLUTE: 0.1 THOU/MM3 (ref 0–0.1)
BASOPHILS NFR BLD AUTO: 0.9 %
BUN SERPL-MCNC: 12 MG/DL (ref 7–22)
CALCIUM SERPL-MCNC: 9.3 MG/DL (ref 8.5–10.5)
CHLORIDE SERPL-SCNC: 105 MEQ/L (ref 98–111)
CO2 SERPL-SCNC: 23 MEQ/L (ref 23–33)
CREAT SERPL-MCNC: 0.9 MG/DL (ref 0.4–1.2)
DEPRECATED RDW RBC AUTO: 46 FL (ref 35–45)
EKG ATRIAL RATE: 50 BPM
EKG P AXIS: 74 DEGREES
EKG P-R INTERVAL: 138 MS
EKG Q-T INTERVAL: 430 MS
EKG QRS DURATION: 80 MS
EKG QTC CALCULATION (BAZETT): 392 MS
EKG R AXIS: 58 DEGREES
EKG T AXIS: 56 DEGREES
EKG VENTRICULAR RATE: 50 BPM
EOSINOPHIL NFR BLD AUTO: 1.9 %
EOSINOPHILS ABSOLUTE: 0.1 THOU/MM3 (ref 0–0.4)
ERYTHROCYTE [DISTWIDTH] IN BLOOD BY AUTOMATED COUNT: 13.4 % (ref 11.5–14.5)
GFR SERPL CREATININE-BSD FRML MDRD: > 60 ML/MIN/1.73M2
GLUCOSE SERPL-MCNC: 87 MG/DL (ref 70–108)
HCT VFR BLD AUTO: 46 % (ref 37–47)
HGB BLD-MCNC: 14.4 GM/DL (ref 12–16)
IMM GRANULOCYTES # BLD AUTO: 0.01 THOU/MM3 (ref 0–0.07)
IMM GRANULOCYTES NFR BLD AUTO: 0.2 %
LYMPHOCYTES ABSOLUTE: 2 THOU/MM3 (ref 1–4.8)
LYMPHOCYTES NFR BLD AUTO: 34.7 %
MCH RBC QN AUTO: 29 PG (ref 26–33)
MCHC RBC AUTO-ENTMCNC: 31.3 GM/DL (ref 32.2–35.5)
MCV RBC AUTO: 92.6 FL (ref 81–99)
MONOCYTES ABSOLUTE: 0.3 THOU/MM3 (ref 0.4–1.3)
MONOCYTES NFR BLD AUTO: 5.6 %
NEUTROPHILS NFR BLD AUTO: 56.7 %
NRBC BLD AUTO-RTO: 0 /100 WBC
OSMOLALITY SERPL CALC.SUM OF ELEC: 280.4 MOSMOL/KG (ref 275–300)
PLATELET # BLD AUTO: 320 THOU/MM3 (ref 130–400)
PMV BLD AUTO: 9.4 FL (ref 9.4–12.4)
POTASSIUM SERPL-SCNC: 3.5 MEQ/L (ref 3.5–5.2)
RBC # BLD AUTO: 4.97 MILL/MM3 (ref 4.2–5.4)
SEGMENTED NEUTROPHILS ABSOLUTE COUNT: 3.3 THOU/MM3 (ref 1.8–7.7)
SODIUM SERPL-SCNC: 141 MEQ/L (ref 135–145)
TROPONIN, HIGH SENSITIVITY: < 6 NG/L (ref 0–12)
WBC # BLD AUTO: 5.9 THOU/MM3 (ref 4.8–10.8)

## 2023-08-17 PROCEDURE — 99285 EMERGENCY DEPT VISIT HI MDM: CPT

## 2023-08-17 PROCEDURE — 93005 ELECTROCARDIOGRAM TRACING: CPT | Performed by: STUDENT IN AN ORGANIZED HEALTH CARE EDUCATION/TRAINING PROGRAM

## 2023-08-17 PROCEDURE — 71046 X-RAY EXAM CHEST 2 VIEWS: CPT

## 2023-08-17 PROCEDURE — 93010 ELECTROCARDIOGRAM REPORT: CPT | Performed by: INTERNAL MEDICINE

## 2023-08-17 PROCEDURE — 80048 BASIC METABOLIC PNL TOTAL CA: CPT

## 2023-08-17 PROCEDURE — 36415 COLL VENOUS BLD VENIPUNCTURE: CPT

## 2023-08-17 PROCEDURE — 85025 COMPLETE CBC W/AUTO DIFF WBC: CPT

## 2023-08-17 PROCEDURE — 84484 ASSAY OF TROPONIN QUANT: CPT

## 2023-08-17 ASSESSMENT — PAIN - FUNCTIONAL ASSESSMENT: PAIN_FUNCTIONAL_ASSESSMENT: NONE - DENIES PAIN

## 2023-08-17 ASSESSMENT — HEART SCORE: ECG: 0

## 2023-08-17 NOTE — TELEPHONE ENCOUNTER
Pt called stating that she got a radial BP cuff because her BP has ran high when she comes in for appointments. Her BP is 209/144 and the recheck was 208/135 HR 61.     Pt denies HA, vertigo, weakness/numbness/loss of balance anywhere, facial dropping, problems with speech (sounded a little slow and maybe slurred but unsure of pt's baseline). She did say she sometimes has blurred vision, but she blinks and it goes away (denies any other vision problems), and her neck and shoulders are a bit sore today (pt said that she thinks she \"slept wrong\"). Advised pt to go to the ED to r/o anything life threatening and to verify BP. Please advise.

## 2023-08-17 NOTE — ED NOTES
Pt in through ED lobby. She states since yesterday she started checking her BP. She had BP of 198/118. She states this has continued. She states she is asymptomatic with this and denies any SOB, chest pain, or dizziness. She states she does not take anything for BP.       Helon Heimlich, RN  08/17/23 1200

## 2023-08-18 NOTE — TELEPHONE ENCOUNTER
Spoke with patient, She did go to the ER last night, when she arrived. Pt's blood pressure was okay, they ran labs, EKG and Chest X-Ray. Sent patient home after everything stating everything was okay. She did taker her blood pressure while on the phone, 118/111 HR was 56. Radial. Only other issue she was having was sweaty palms.

## 2023-08-26 LAB
EKG ATRIAL RATE: 50 BPM
EKG P AXIS: 74 DEGREES
EKG P-R INTERVAL: 138 MS
EKG Q-T INTERVAL: 430 MS
EKG QRS DURATION: 80 MS
EKG QTC CALCULATION (BAZETT): 392 MS
EKG R AXIS: 58 DEGREES
EKG T AXIS: 56 DEGREES
EKG VENTRICULAR RATE: 50 BPM

## 2023-08-29 ENCOUNTER — OFFICE VISIT (OUTPATIENT)
Dept: FAMILY MEDICINE CLINIC | Age: 43
End: 2023-08-29
Payer: COMMERCIAL

## 2023-08-29 VITALS
HEART RATE: 76 BPM | BODY MASS INDEX: 36.13 KG/M2 | OXYGEN SATURATION: 98 % | SYSTOLIC BLOOD PRESSURE: 102 MMHG | DIASTOLIC BLOOD PRESSURE: 70 MMHG | HEIGHT: 67 IN | WEIGHT: 230.2 LBS | TEMPERATURE: 98.1 F | RESPIRATION RATE: 16 BRPM

## 2023-08-29 DIAGNOSIS — L74.513 HYPERHIDROSIS OF PALMS AND SOLES: Primary | ICD-10-CM

## 2023-08-29 DIAGNOSIS — L74.512 HYPERHIDROSIS OF PALMS AND SOLES: Primary | ICD-10-CM

## 2023-08-29 DIAGNOSIS — R03.0 ELEVATED BLOOD-PRESSURE READING WITHOUT DIAGNOSIS OF HYPERTENSION: ICD-10-CM

## 2023-08-29 PROCEDURE — 4004F PT TOBACCO SCREEN RCVD TLK: CPT

## 2023-08-29 PROCEDURE — 99213 OFFICE O/P EST LOW 20 MIN: CPT

## 2023-08-29 PROCEDURE — G8417 CALC BMI ABV UP PARAM F/U: HCPCS

## 2023-08-29 PROCEDURE — G8427 DOCREV CUR MEDS BY ELIG CLIN: HCPCS

## 2023-08-29 RX ORDER — GLYCOPYRROLATE 1 MG/1
1 TABLET ORAL 3 TIMES DAILY
Qty: 90 TABLET | Refills: 3 | Status: CANCELLED | OUTPATIENT
Start: 2023-08-29

## 2023-08-30 RX ORDER — HYDROCODONE BITARTRATE 100 %
POWDER (GRAM) MISCELLANEOUS
Qty: 500 G | Refills: 0 | Status: SHIPPED | OUTPATIENT
Start: 2023-08-30

## 2023-08-30 ASSESSMENT — ENCOUNTER SYMPTOMS
ABDOMINAL PAIN: 0
TROUBLE SWALLOWING: 0
SORE THROAT: 0
CONSTIPATION: 0
DIARRHEA: 0
PHOTOPHOBIA: 0
NAUSEA: 0
VOMITING: 0
WHEEZING: 0
SHORTNESS OF BREATH: 0
SINUS PRESSURE: 0
VOICE CHANGE: 0
BLOOD IN STOOL: 0
SINUS PAIN: 0
COUGH: 0
RHINORRHEA: 0

## 2023-08-31 ENCOUNTER — TELEPHONE (OUTPATIENT)
Dept: FAMILY MEDICINE CLINIC | Age: 43
End: 2023-08-31

## 2023-08-31 DIAGNOSIS — L74.512 HYPERHIDROSIS OF PALMS AND SOLES: Primary | ICD-10-CM

## 2023-08-31 DIAGNOSIS — L74.513 HYPERHIDROSIS OF PALMS AND SOLES: Primary | ICD-10-CM

## 2023-08-31 NOTE — TELEPHONE ENCOUNTER
[facility] pharmacist called states that Aluminum Chloride Hexahydrate POWD does not come in powder anymore, only comes in Liquid solution called Drysol. Pharmacy is requesting a new script for Drysol liquid.      Please advise/send

## 2023-08-31 NOTE — TELEPHONE ENCOUNTER
Problem: Safety  Goal: Will remain free from injury  Outcome: PROGRESSING AS EXPECTED  Note:   Patient calls appropriately before getting out of bed and patient is steady on feet. Low fall risk at this time.      Problem: Bowel/Gastric:  Goal: Normal bowel function is maintained or improved  Outcome: PROGRESSING AS EXPECTED  Note:   Patient with UC and does not have issues with constipation at this time.       Resent.     Electronically signed by Bacilio Christopher MD on 8/31/2023 at 11:18 AM

## 2023-08-31 NOTE — PROGRESS NOTES
Medina Clayo (:  1980) is a 39 y.o. female,Established patient, here for evaluation of the following chief complaint(s):  Follow-up (Pt presents for a f/u. Pt states she has been doing good. )         ASSESSMENT/PLAN:  1. Hyperhidrosis of palms and soles  -     Aluminum Chloride Hexahydrate POWD; Use on palms twice daily as needed for sweating., Disp-500 g, R-0Normal  2. Elevated blood-pressure reading without diagnosis of hypertension    Start aAluminiumCl hexahydrate bid prn. Avoid robinul at this time in setting of [facility]. Elevated bp reading, recommend using Non wRist cuff at this time for more accurate bp reading as otherwise weill controlled in office. Return in about 2 months (around 10/29/2023) for f/u hyperhydrosis. .         Subjective   SUBJECTIVE/OBJECTIVE:  HPI    Pt is a 38 y/o female w PMH psych discorder, asthma, gerd, scoliosis, hypothyroidism, presenting for e/d fu for elevated bp reaading. Ed notes reviewd from aug/. Pt reports using wrist cuff to measure, bp in office controlled at 102/70 at this time. N ohx of Htn noted. Pt complain of 2 years of sweaty palms and soles, has not tried anything for this discussed options including antiperspirant, botox, and ganglionectomy. Review of Systems   Constitutional:  Positive for diaphoresis. Negative for chills, fatigue and fever. HENT:  Negative for congestion, postnasal drip, rhinorrhea, sinus pressure, sinus pain, sore throat, trouble swallowing and voice change. Eyes:  Negative for photophobia and visual disturbance. Respiratory:  Negative for cough, shortness of breath and wheezing. Cardiovascular:  Negative for chest pain, palpitations and leg swelling. Gastrointestinal:  Negative for abdominal pain, blood in stool, constipation, diarrhea, nausea and vomiting.    Genitourinary:  Negative for decreased urine volume, difficulty urinating, dysuria, frequency, hematuria, menstrual

## 2023-10-14 ENCOUNTER — HOSPITAL ENCOUNTER (EMERGENCY)
Age: 43
Discharge: HOME | End: 2023-10-14
Payer: COMMERCIAL

## 2023-10-14 VITALS
SYSTOLIC BLOOD PRESSURE: 137 MMHG | RESPIRATION RATE: 16 BRPM | OXYGEN SATURATION: 98 % | TEMPERATURE: 97.9 F | HEART RATE: 72 BPM | DIASTOLIC BLOOD PRESSURE: 82 MMHG

## 2023-10-14 DIAGNOSIS — R30.0 DYSURIA: ICD-10-CM

## 2023-10-14 DIAGNOSIS — R73.9 ELEVATED BLOOD SUGAR: Primary | ICD-10-CM

## 2023-10-14 LAB
BILIRUB UR STRIP.AUTO-MCNC: NEGATIVE MG/DL
CHARACTER UR: ABNORMAL
CHP ED QC CHECK: 119
COLOR: COLORLESS
GLUCOSE BLD STRIP.AUTO-MCNC: 119 MG/DL (ref 70–108)
GLUCOSE UR QL STRIP.AUTO: NEGATIVE MG/DL
KETONES UR QL STRIP.AUTO: NEGATIVE
NITRITE UR QL STRIP.AUTO: NEGATIVE
PH UR STRIP.AUTO: 6 [PH] (ref 5–9)
PROT UR STRIP.AUTO-MCNC: NEGATIVE MG/DL
RBC #/AREA URNS HPF: NEGATIVE /[HPF]
SP GR UR STRIP.AUTO: 1.01 (ref 1–1.03)
UROBILINOGEN, URINE: 0.2 EU/DL (ref 0.2–1)
WBC #/AREA URNS HPF: ABNORMAL /[HPF]

## 2023-10-14 PROCEDURE — 99213 OFFICE O/P EST LOW 20 MIN: CPT

## 2023-10-14 PROCEDURE — 81003 URINALYSIS AUTO W/O SCOPE: CPT

## 2023-10-14 PROCEDURE — 99213 OFFICE O/P EST LOW 20 MIN: CPT | Performed by: NURSE PRACTITIONER

## 2023-10-14 PROCEDURE — 82948 REAGENT STRIP/BLOOD GLUCOSE: CPT

## 2023-10-14 RX ORDER — CEPHALEXIN 250 MG/1
250 CAPSULE ORAL 2 TIMES DAILY
Qty: 6 CAPSULE | Refills: 0 | Status: SHIPPED | OUTPATIENT
Start: 2023-10-14 | End: 2023-10-17

## 2023-10-14 ASSESSMENT — ENCOUNTER SYMPTOMS
SHORTNESS OF BREATH: 0
DIARRHEA: 0
ABDOMINAL PAIN: 0
NAUSEA: 0
VOMITING: 0

## 2023-10-14 ASSESSMENT — PAIN - FUNCTIONAL ASSESSMENT: PAIN_FUNCTIONAL_ASSESSMENT: NONE - DENIES PAIN

## 2023-10-19 ENCOUNTER — OFFICE VISIT (OUTPATIENT)
Dept: FAMILY MEDICINE CLINIC | Age: 43
End: 2023-10-19
Payer: COMMERCIAL

## 2023-10-19 VITALS
TEMPERATURE: 98.4 F | OXYGEN SATURATION: 98 % | WEIGHT: 230.2 LBS | HEART RATE: 85 BPM | BODY MASS INDEX: 36.13 KG/M2 | HEIGHT: 67 IN | DIASTOLIC BLOOD PRESSURE: 88 MMHG | SYSTOLIC BLOOD PRESSURE: 120 MMHG

## 2023-10-19 DIAGNOSIS — N39.0 URINARY TRACT INFECTION WITHOUT HEMATURIA, SITE UNSPECIFIED: ICD-10-CM

## 2023-10-19 DIAGNOSIS — R73.03 PREDIABETES: Primary | ICD-10-CM

## 2023-10-19 DIAGNOSIS — Z72.0 NICOTINE ABUSE: ICD-10-CM

## 2023-10-19 PROCEDURE — 4004F PT TOBACCO SCREEN RCVD TLK: CPT

## 2023-10-19 PROCEDURE — G8417 CALC BMI ABV UP PARAM F/U: HCPCS

## 2023-10-19 PROCEDURE — G8427 DOCREV CUR MEDS BY ELIG CLIN: HCPCS

## 2023-10-19 PROCEDURE — G8484 FLU IMMUNIZE NO ADMIN: HCPCS

## 2023-10-19 PROCEDURE — 99213 OFFICE O/P EST LOW 20 MIN: CPT

## 2023-10-19 RX ORDER — METFORMIN HYDROCHLORIDE 500 MG/1
500 TABLET, EXTENDED RELEASE ORAL
Qty: 30 TABLET | Refills: 3 | Status: SHIPPED | OUTPATIENT
Start: 2023-10-19

## 2023-10-19 ASSESSMENT — ENCOUNTER SYMPTOMS
ALLERGIC/IMMUNOLOGIC NEGATIVE: 1
GASTROINTESTINAL NEGATIVE: 1
RESPIRATORY NEGATIVE: 1
EYES NEGATIVE: 1

## 2024-02-26 ENCOUNTER — OFFICE VISIT (OUTPATIENT)
Dept: FAMILY MEDICINE CLINIC | Age: 44
End: 2024-02-26
Payer: COMMERCIAL

## 2024-02-26 ENCOUNTER — NURSE ONLY (OUTPATIENT)
Dept: LAB | Age: 44
End: 2024-02-26

## 2024-02-26 VITALS
DIASTOLIC BLOOD PRESSURE: 62 MMHG | TEMPERATURE: 98.3 F | SYSTOLIC BLOOD PRESSURE: 102 MMHG | RESPIRATION RATE: 10 BRPM | OXYGEN SATURATION: 96 % | WEIGHT: 223.2 LBS | BODY MASS INDEX: 35.03 KG/M2 | HEART RATE: 74 BPM | HEIGHT: 67 IN

## 2024-02-26 DIAGNOSIS — E03.9 HYPOTHYROIDISM, UNSPECIFIED TYPE: ICD-10-CM

## 2024-02-26 DIAGNOSIS — Z01.89 ENCOUNTER FOR ROUTINE LABORATORY TESTING: ICD-10-CM

## 2024-02-26 DIAGNOSIS — R76.12 (QFT) QUANTIFERON-TB TEST REACTION WITHOUT ACTIVE TUBERCULOSIS: ICD-10-CM

## 2024-02-26 DIAGNOSIS — Z72.0 NICOTINE ABUSE: ICD-10-CM

## 2024-02-26 DIAGNOSIS — F32.A DEPRESSION, UNSPECIFIED DEPRESSION TYPE: ICD-10-CM

## 2024-02-26 DIAGNOSIS — R73.03 PREDIABETES: ICD-10-CM

## 2024-02-26 DIAGNOSIS — Z01.89 ENCOUNTER FOR ROUTINE LABORATORY TESTING: Primary | ICD-10-CM

## 2024-02-26 LAB
ALBUMIN SERPL BCG-MCNC: 4.2 G/DL (ref 3.5–5.1)
ALP SERPL-CCNC: 88 U/L (ref 38–126)
ALT SERPL W/O P-5'-P-CCNC: 8 U/L (ref 11–66)
ANION GAP SERPL CALC-SCNC: 14 MEQ/L (ref 8–16)
AST SERPL-CCNC: 11 U/L (ref 5–40)
BILIRUB SERPL-MCNC: 0.2 MG/DL (ref 0.3–1.2)
BUN SERPL-MCNC: 7 MG/DL (ref 7–22)
CALCIUM SERPL-MCNC: 9.1 MG/DL (ref 8.5–10.5)
CHLORIDE SERPL-SCNC: 106 MEQ/L (ref 98–111)
CHOLEST SERPL-MCNC: 218 MG/DL (ref 100–199)
CO2 SERPL-SCNC: 21 MEQ/L (ref 23–33)
CREAT SERPL-MCNC: 0.9 MG/DL (ref 0.4–1.2)
DEPRECATED MEAN GLUCOSE BLD GHB EST-ACNC: 108 MG/DL (ref 70–126)
DEPRECATED RDW RBC AUTO: 42.8 FL (ref 35–45)
ERYTHROCYTE [DISTWIDTH] IN BLOOD BY AUTOMATED COUNT: 12.6 % (ref 11.5–14.5)
GFR SERPL CREATININE-BSD FRML MDRD: > 60 ML/MIN/1.73M2
GLUCOSE FASTING: 85 MG/DL (ref 70–108)
HBA1C MFR BLD HPLC: 5.6 % (ref 4.4–6.4)
HCT VFR BLD AUTO: 44.1 % (ref 37–47)
HDLC SERPL-MCNC: 49 MG/DL
HGB BLD-MCNC: 14.3 GM/DL (ref 12–16)
LDLC SERPL CALC-MCNC: 139 MG/DL
MCH RBC QN AUTO: 30.1 PG (ref 26–33)
MCHC RBC AUTO-ENTMCNC: 32.4 GM/DL (ref 32.2–35.5)
MCV RBC AUTO: 92.8 FL (ref 81–99)
PLATELET # BLD AUTO: 292 THOU/MM3 (ref 130–400)
PMV BLD AUTO: 10.3 FL (ref 9.4–12.4)
POTASSIUM SERPL-SCNC: 4.1 MEQ/L (ref 3.5–5.2)
PROT SERPL-MCNC: 7 G/DL (ref 6.1–8)
RBC # BLD AUTO: 4.75 MILL/MM3 (ref 4.2–5.4)
SODIUM SERPL-SCNC: 141 MEQ/L (ref 135–145)
TRIGL SERPL-MCNC: 150 MG/DL (ref 0–199)
TSH SERPL DL<=0.005 MIU/L-ACNC: 4.1 UIU/ML (ref 0.4–4.2)
WBC # BLD AUTO: 6.5 THOU/MM3 (ref 4.8–10.8)

## 2024-02-26 PROCEDURE — 4004F PT TOBACCO SCREEN RCVD TLK: CPT

## 2024-02-26 PROCEDURE — G8427 DOCREV CUR MEDS BY ELIG CLIN: HCPCS

## 2024-02-26 PROCEDURE — G8484 FLU IMMUNIZE NO ADMIN: HCPCS

## 2024-02-26 PROCEDURE — G8417 CALC BMI ABV UP PARAM F/U: HCPCS

## 2024-02-26 PROCEDURE — 99214 OFFICE O/P EST MOD 30 MIN: CPT

## 2024-02-26 RX ORDER — METFORMIN HYDROCHLORIDE 500 MG/1
500 TABLET, EXTENDED RELEASE ORAL
Qty: 30 TABLET | Refills: 3 | Status: SHIPPED | OUTPATIENT
Start: 2024-02-26

## 2024-02-26 RX ORDER — CLONAZEPAM 0.5 MG/1
0.5 TABLET ORAL DAILY PRN
COMMUNITY
Start: 2024-01-24

## 2024-02-26 ASSESSMENT — PATIENT HEALTH QUESTIONNAIRE - PHQ9
7. TROUBLE CONCENTRATING ON THINGS, SUCH AS READING THE NEWSPAPER OR WATCHING TELEVISION: 1
SUM OF ALL RESPONSES TO PHQ QUESTIONS 1-9: 7
5. POOR APPETITE OR OVEREATING: 1
1. LITTLE INTEREST OR PLEASURE IN DOING THINGS: 3
3. TROUBLE FALLING OR STAYING ASLEEP: 0
SUM OF ALL RESPONSES TO PHQ QUESTIONS 1-9: 7
10. IF YOU CHECKED OFF ANY PROBLEMS, HOW DIFFICULT HAVE THESE PROBLEMS MADE IT FOR YOU TO DO YOUR WORK, TAKE CARE OF THINGS AT HOME, OR GET ALONG WITH OTHER PEOPLE: 0
2. FEELING DOWN, DEPRESSED OR HOPELESS: 1
9. THOUGHTS THAT YOU WOULD BE BETTER OFF DEAD, OR OF HURTING YOURSELF: 0
SUM OF ALL RESPONSES TO PHQ9 QUESTIONS 1 & 2: 4
8. MOVING OR SPEAKING SO SLOWLY THAT OTHER PEOPLE COULD HAVE NOTICED. OR THE OPPOSITE, BEING SO FIGETY OR RESTLESS THAT YOU HAVE BEEN MOVING AROUND A LOT MORE THAN USUAL: 0
6. FEELING BAD ABOUT YOURSELF - OR THAT YOU ARE A FAILURE OR HAVE LET YOURSELF OR YOUR FAMILY DOWN: 1
4. FEELING TIRED OR HAVING LITTLE ENERGY: 0
SUM OF ALL RESPONSES TO PHQ QUESTIONS 1-9: 7

## 2024-02-26 NOTE — PROGRESS NOTES
Patient:Rozina Reynolds  YOB: 1980   MRN:[id number]    Subjective   43 y.o. 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   Patient notes mood has been stable with Wellbutrin with no recent MDE. Patient notes stressors with room mate and financial insecurities.   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     Discussed tobacco cessation with patient. Patient notes smoking one half pack of cigarettes per day due to worsen stress.      Patient notes cold intolerance, clampy fingers, hair loss and increased memory loss for over one year. Patient with pmh of hypothyrodism and he not been on medication since her past provider moved out of town.     Patient denies any chest pain, shortness of breath, nausea, vomiting, diarrhea, abdominal pain and headaches.       Review of Systems   Constitutional: Negative.    HENT: Negative.     Cardiovascular: Negative.    Gastrointestinal: Negative.    Endocrine: Positive for cold intolerance.   Genitourinary: Negative.    Musculoskeletal: Negative.    Skin:  Positive for pallor.   Allergic/Immunologic: Negative.    Neurological: Negative.    Hematological: Negative.    Psychiatric/Behavioral: Negative.     All other systems reviewed and are negative.    PMHx: She has a past medical history of Abdominal pain, right upper quadrant, Acute cystitis, Allergic rhinitis, Anxiety, Arthritis, CAD (coronary artery disease), Depression (emotion), DUB (dysfunctional uterine bleeding), Generalized anxiety disorder, GERD (gastroesophageal reflux disease), Hypertriglyceridemia, Hypoglycemia, Hypothyroid, Movement disorder, Pancreatitis, PONV (postoperative nausea and vomiting), Psychiatric problem, Scoliosis, and Tobacco abuse.    Objective     Vitals:    02/26/24 1332   BP: 102/62   Site: Left Upper Arm 
Pharmacy Medication History Note      List of current medications patient is taking is complete.    Source of information: patient, dispense report    Medications removed (include reason, ex. therapy complete or physician discontinued):  Removed Flonase - not taking per pt  Removed hydrocortisone cream - not taking per pt  Removed Effexor - no longer taking as of a few days ago, pyschiatrist stopped  Removed prazosin - stopped taking ~6 months ago, psychiatrist stopped  Removed APAP - not taking    Medications added/doses adjusted:  Added Clonazepam 0.5 mg tablet daily PRN - patient reports taking ~3 tabs at once when having a panic attack (~once per month)    Other notes (ex. Recent course of antibiotics, Coumadin dosing):  Stopped taking levothyroxine ~2 months ago - previous provider did not refill (experiencing symptoms of forgetfulness and hair falling out)  Actually taking 4 tablets of buspirone 10 mg in the morning instead of as prescribed as \"it works better and is easier for me to remember\" per patient  Denies use of other OTC or herbal medications.      Allergies reviewed    Provider informed of above    Refills requested today:  Metformin  Requesting new prescription for levothyroxine      Trudi Vasques [facility]  2/26/2024 1:52 PM     For Pharmacy Admin Tracking Only    Program: Medical Group  CPA in place:  No  Time Spent (min): 20    
Hepatitis B vaccine (1 of 3 - 3-dose series) Never done    Pneumococcal 0-64 years Vaccine (1 - PCV) Never done    Flu vaccine (1) 08/01/2023    COVID-19 Vaccine (2 - 2023-24 season) 09/01/2023       Attending Physician Statement  I have discussed the case, including pertinent history and exam findings with the resident. I also have seen the patient and performed key portions of the examination.  I agree with the documented assessment and plan as documented by the resident.  SANDRA Salas Jr., DO 3/1/2024 7:10 AM

## 2024-02-28 RX ORDER — LEVOTHYROXINE SODIUM 0.05 MG/1
TABLET ORAL
Qty: 90 TABLET | Refills: 1 | Status: SHIPPED | OUTPATIENT
Start: 2024-02-28

## 2024-02-28 ASSESSMENT — ENCOUNTER SYMPTOMS
GASTROINTESTINAL NEGATIVE: 1
ALLERGIC/IMMUNOLOGIC NEGATIVE: 1

## 2024-02-29 ENCOUNTER — PATIENT MESSAGE (OUTPATIENT)
Dept: FAMILY MEDICINE CLINIC | Age: 44
End: 2024-02-29

## 2024-02-29 LAB
GAMMA INTERFERON BACKGROUND BLD IA-ACNC: 0.02 IU/ML
M TB IFN-G BLD-IMP: NEGATIVE
M TB IFN-G CD4+ BCKGRND COR BLD-ACNC: 0.19 IU/ML (ref 0–0.34)
M TB IFN-G CD4+CD8+ BCKGRND COR BLD-ACNC: 0.27 IU/ML (ref 0–0.34)
MITOGEN IGNF BCKGRD COR BLD-ACNC: > 10 IU/ML

## 2024-03-01 ENCOUNTER — TELEPHONE (OUTPATIENT)
Dept: FAMILY MEDICINE CLINIC | Age: 44
End: 2024-03-01

## 2024-03-01 NOTE — TELEPHONE ENCOUNTER
Called pt and left VM regarding tb test results (per HIPAA) informed pt to call us back with any questions.

## 2024-03-01 NOTE — TELEPHONE ENCOUNTER
----- Message from Rozina Reynolds sent at 2/29/2024  9:36 PM EST -----  Regarding: TB screening results  Contact: [phone]  What's that mean

## 2024-03-06 NOTE — TELEPHONE ENCOUNTER
From: Hayde Herrera MD  To: Rozina Reynolds  Sent: 2/29/2024 9:20 PM EST  Subject: TB screening results    Good day, Ms. Angel,     Your TB screening result came back negative.

## 2024-03-27 ENCOUNTER — TELEPHONE (OUTPATIENT)
Dept: FAMILY MEDICINE CLINIC | Age: 44
End: 2024-03-27

## 2024-03-27 NOTE — TELEPHONE ENCOUNTER
Hayde Herrera MD  [facility] Clinical Staff  Please call patient and tell patient the following  Your recent Labs were mostly stable.  Labs Noted elevated cholesterol with elevated TSH levels.  We would like to resume your thyroid medication you were previously on with your past PCP. Thyroid medication was sent to your pharmacy.    Thank you.

## 2025-04-14 ENCOUNTER — HOSPITAL ENCOUNTER (INPATIENT)
Age: 45
LOS: 4 days | Discharge: HOME | DRG: 885 | End: 2025-04-18
Attending: EMERGENCY MEDICINE | Admitting: PSYCHIATRY & NEUROLOGY
Payer: MEDICAID

## 2025-04-14 DIAGNOSIS — Z71.1 MENTAL HEALTH-RELATED COMPLAINT: Primary | ICD-10-CM

## 2025-04-14 PROBLEM — F33.2 MDD (MAJOR DEPRESSIVE DISORDER), RECURRENT SEVERE, WITHOUT PSYCHOSIS (HCC): Status: ACTIVE | Noted: 2025-04-14

## 2025-04-14 LAB
AMPHETAMINES UR QL SCN: NEGATIVE
ANION GAP SERPL CALC-SCNC: 18 MEQ/L (ref 8–16)
APAP SERPL-MCNC: < 5 UG/ML (ref 10–30)
B-HCG SERPL QL: NEGATIVE
BACTERIA URNS QL MICRO: ABNORMAL /HPF
BARBITURATES UR QL SCN: NEGATIVE
BASOPHILS ABSOLUTE: 0.1 THOU/MM3 (ref 0–0.1)
BASOPHILS NFR BLD AUTO: 1.2 %
BENZODIAZ UR QL SCN: NEGATIVE
BILIRUB UR QL STRIP.AUTO: NEGATIVE
BUN SERPL-MCNC: 16 MG/DL (ref 8–23)
BZE UR QL SCN: NEGATIVE
CALCIUM SERPL-MCNC: 9.5 MG/DL (ref 8.6–10)
CANNABINOIDS UR QL SCN: POSITIVE
CASTS #/AREA URNS LPF: ABNORMAL /LPF
CASTS 2: ABNORMAL /LPF
CHARACTER UR: ABNORMAL
CHLORIDE SERPL-SCNC: 108 MEQ/L (ref 98–111)
CO2 SERPL-SCNC: 13 MEQ/L (ref 22–29)
COLOR, UA: YELLOW
CREAT SERPL-MCNC: 1 MG/DL (ref 0.5–0.9)
CRYSTALS URNS MICRO: ABNORMAL
DEPRECATED RDW RBC AUTO: 42.9 FL (ref 35–45)
EKG ATRIAL RATE: 75 BPM
EKG P AXIS: 65 DEGREES
EKG P-R INTERVAL: 124 MS
EKG Q-T INTERVAL: 392 MS
EKG QRS DURATION: 84 MS
EKG QTC CALCULATION (BAZETT): 437 MS
EKG R AXIS: 67 DEGREES
EKG T AXIS: 67 DEGREES
EKG VENTRICULAR RATE: 75 BPM
EOSINOPHIL NFR BLD AUTO: 1 %
EOSINOPHILS ABSOLUTE: 0.1 THOU/MM3 (ref 0–0.4)
EPITHELIAL CELLS, UA: ABNORMAL /HPF
ERYTHROCYTE [DISTWIDTH] IN BLOOD BY AUTOMATED COUNT: 12.8 % (ref 11.5–14.5)
ETHANOL SERPL-MCNC: < 0.01 % (ref 0–0.08)
FENTANYL: NEGATIVE
GFR SERPL CREATININE-BSD FRML MDRD: 70 ML/MIN/1.73M2
GLUCOSE SERPL-MCNC: 85 MG/DL (ref 74–109)
GLUCOSE UR QL STRIP.AUTO: NEGATIVE MG/DL
HCT VFR BLD AUTO: 44 % (ref 37–47)
HGB BLD-MCNC: 14.3 GM/DL (ref 12–16)
HGB UR QL STRIP.AUTO: ABNORMAL
IMM GRANULOCYTES # BLD AUTO: 0.01 THOU/MM3 (ref 0–0.07)
IMM GRANULOCYTES NFR BLD AUTO: 0.1 %
KETONES UR QL STRIP.AUTO: 15
LYMPHOCYTES ABSOLUTE: 2.6 THOU/MM3 (ref 1–4.8)
LYMPHOCYTES NFR BLD AUTO: 33.6 %
MCH RBC QN AUTO: 30 PG (ref 26–33)
MCHC RBC AUTO-ENTMCNC: 32.5 GM/DL (ref 32.2–35.5)
MCV RBC AUTO: 92.2 FL (ref 81–99)
MISCELLANEOUS 2: ABNORMAL
MONOCYTES ABSOLUTE: 0.4 THOU/MM3 (ref 0.4–1.3)
MONOCYTES NFR BLD AUTO: 5.2 %
NEUTROPHILS ABSOLUTE: 4.5 THOU/MM3 (ref 1.8–7.7)
NEUTROPHILS NFR BLD AUTO: 58.9 %
NITRITE UR QL STRIP: NEGATIVE
NRBC BLD AUTO-RTO: 0 /100 WBC
OPIATES UR QL SCN: NEGATIVE
OSMOLALITY SERPL CALC.SUM OF ELEC: 278 MOSMOL/KG (ref 275–300)
OXYCODONE: NEGATIVE
PCP UR QL SCN: NEGATIVE
PH UR STRIP.AUTO: 5.5 [PH] (ref 5–9)
PLATELET # BLD AUTO: 287 THOU/MM3 (ref 130–400)
PMV BLD AUTO: 9.9 FL (ref 9.4–12.4)
POTASSIUM SERPL-SCNC: 3.5 MEQ/L (ref 3.5–5.2)
PROT UR STRIP.AUTO-MCNC: NEGATIVE MG/DL
RBC # BLD AUTO: 4.77 MILL/MM3 (ref 4.2–5.4)
RBC URINE: ABNORMAL /HPF
RENAL EPI CELLS #/AREA URNS HPF: ABNORMAL /[HPF]
SALICYLATES SERPL-MCNC: < 0.5 MG/DL (ref 2–10)
SODIUM SERPL-SCNC: 139 MEQ/L (ref 135–145)
SP GR UR REFRACT.AUTO: 1.02 (ref 1–1.03)
TSH SERPL DL<=0.05 MIU/L-ACNC: 3.01 UIU/ML (ref 0.27–4.2)
UROBILINOGEN, URINE: 1 EU/DL (ref 0–1)
WBC # BLD AUTO: 7.6 THOU/MM3 (ref 4.8–10.8)
WBC #/AREA URNS HPF: ABNORMAL /HPF
WBC #/AREA URNS HPF: NEGATIVE /[HPF]
YEAST LIKE FUNGI URNS QL MICRO: ABNORMAL

## 2025-04-14 PROCEDURE — 82077 ASSAY SPEC XCP UR&BREATH IA: CPT

## 2025-04-14 PROCEDURE — 6370000000 HC RX 637 (ALT 250 FOR IP): Performed by: EMERGENCY MEDICINE

## 2025-04-14 PROCEDURE — 84703 CHORIONIC GONADOTROPIN ASSAY: CPT

## 2025-04-14 PROCEDURE — 99285 EMERGENCY DEPT VISIT HI MDM: CPT

## 2025-04-14 PROCEDURE — 80048 BASIC METABOLIC PNL TOTAL CA: CPT

## 2025-04-14 PROCEDURE — 93005 ELECTROCARDIOGRAM TRACING: CPT | Performed by: EMERGENCY MEDICINE

## 2025-04-14 PROCEDURE — 84443 ASSAY THYROID STIM HORMONE: CPT

## 2025-04-14 PROCEDURE — 1240000000 HC EMOTIONAL WELLNESS R&B

## 2025-04-14 PROCEDURE — 6370000000 HC RX 637 (ALT 250 FOR IP): Performed by: PSYCHIATRY & NEUROLOGY

## 2025-04-14 PROCEDURE — 81001 URINALYSIS AUTO W/SCOPE: CPT

## 2025-04-14 PROCEDURE — 36415 COLL VENOUS BLD VENIPUNCTURE: CPT

## 2025-04-14 PROCEDURE — 80179 DRUG ASSAY SALICYLATE: CPT

## 2025-04-14 PROCEDURE — 80307 DRUG TEST PRSMV CHEM ANLYZR: CPT

## 2025-04-14 PROCEDURE — 85025 COMPLETE CBC W/AUTO DIFF WBC: CPT

## 2025-04-14 PROCEDURE — 80143 DRUG ASSAY ACETAMINOPHEN: CPT

## 2025-04-14 RX ORDER — DIPHENHYDRAMINE HCL 25 MG
50 TABLET ORAL ONCE
Status: COMPLETED | OUTPATIENT
Start: 2025-04-14 | End: 2025-04-14

## 2025-04-14 RX ORDER — MAGNESIUM HYDROXIDE/ALUMINUM HYDROXICE/SIMETHICONE 120; 1200; 1200 MG/30ML; MG/30ML; MG/30ML
30 SUSPENSION ORAL EVERY 6 HOURS PRN
Status: DISCONTINUED | OUTPATIENT
Start: 2025-04-14 | End: 2025-04-18 | Stop reason: HOSPADM

## 2025-04-14 RX ORDER — METOCLOPRAMIDE 10 MG/1
10 TABLET ORAL
Status: DISCONTINUED | OUTPATIENT
Start: 2025-04-14 | End: 2025-04-14

## 2025-04-14 RX ORDER — DIPHENHYDRAMINE HYDROCHLORIDE 50 MG/ML
50 INJECTION, SOLUTION INTRAMUSCULAR; INTRAVENOUS ONCE
Status: DISCONTINUED | OUTPATIENT
Start: 2025-04-14 | End: 2025-04-14

## 2025-04-14 RX ORDER — DIPHENHYDRAMINE HCL 25 MG
50 TABLET ORAL EVERY 6 HOURS PRN
Status: DISCONTINUED | OUTPATIENT
Start: 2025-04-14 | End: 2025-04-14

## 2025-04-14 RX ORDER — METOCLOPRAMIDE 10 MG/1
10 TABLET ORAL ONCE
Status: COMPLETED | OUTPATIENT
Start: 2025-04-14 | End: 2025-04-14

## 2025-04-14 RX ORDER — TRAZODONE HYDROCHLORIDE 50 MG/1
50 TABLET ORAL NIGHTLY PRN
Status: DISCONTINUED | OUTPATIENT
Start: 2025-04-14 | End: 2025-04-18

## 2025-04-14 RX ORDER — POLYETHYLENE GLYCOL 3350 17 G/17G
17 POWDER, FOR SOLUTION ORAL DAILY PRN
Status: DISCONTINUED | OUTPATIENT
Start: 2025-04-14 | End: 2025-04-18 | Stop reason: HOSPADM

## 2025-04-14 RX ORDER — HYDROXYZINE HYDROCHLORIDE 25 MG/1
50 TABLET, FILM COATED ORAL 3 TIMES DAILY PRN
Status: DISCONTINUED | OUTPATIENT
Start: 2025-04-14 | End: 2025-04-18 | Stop reason: HOSPADM

## 2025-04-14 RX ORDER — ACETAMINOPHEN 325 MG/1
650 TABLET ORAL EVERY 6 HOURS PRN
Status: DISCONTINUED | OUTPATIENT
Start: 2025-04-14 | End: 2025-04-18 | Stop reason: HOSPADM

## 2025-04-14 RX ORDER — IBUPROFEN 400 MG/1
400 TABLET, FILM COATED ORAL EVERY 6 HOURS PRN
Status: DISCONTINUED | OUTPATIENT
Start: 2025-04-14 | End: 2025-04-18 | Stop reason: HOSPADM

## 2025-04-14 RX ADMIN — METOCLOPRAMIDE 10 MG: 10 TABLET ORAL at 18:00

## 2025-04-14 RX ADMIN — HYDROXYZINE HYDROCHLORIDE 50 MG: 25 TABLET, FILM COATED ORAL at 22:52

## 2025-04-14 RX ADMIN — ACETAMINOPHEN 650 MG: 325 TABLET ORAL at 22:52

## 2025-04-14 RX ADMIN — TRAZODONE HYDROCHLORIDE 50 MG: 50 TABLET ORAL at 22:52

## 2025-04-14 RX ADMIN — DIPHENHYDRAMINE HYDROCHLORIDE 50 MG: 25 TABLET ORAL at 18:00

## 2025-04-14 ASSESSMENT — PATIENT HEALTH QUESTIONNAIRE - PHQ9
4. FEELING TIRED OR HAVING LITTLE ENERGY: MORE THAN HALF THE DAYS
SUM OF ALL RESPONSES TO PHQ QUESTIONS 1-9: 22
1. LITTLE INTEREST OR PLEASURE IN DOING THINGS: MORE THAN HALF THE DAYS
3. TROUBLE FALLING OR STAYING ASLEEP: NEARLY EVERY DAY
6. FEELING BAD ABOUT YOURSELF - OR THAT YOU ARE A FAILURE OR HAVE LET YOURSELF OR YOUR FAMILY DOWN: NEARLY EVERY DAY
SUM OF ALL RESPONSES TO PHQ QUESTIONS 1-9: 22
SUM OF ALL RESPONSES TO PHQ QUESTIONS 1-9: 21
7. TROUBLE CONCENTRATING ON THINGS, SUCH AS READING THE NEWSPAPER OR WATCHING TELEVISION: MORE THAN HALF THE DAYS
8. MOVING OR SPEAKING SO SLOWLY THAT OTHER PEOPLE COULD HAVE NOTICED. OR THE OPPOSITE, BEING SO FIGETY OR RESTLESS THAT YOU HAVE BEEN MOVING AROUND A LOT MORE THAN USUAL: NEARLY EVERY DAY
10. IF YOU CHECKED OFF ANY PROBLEMS, HOW DIFFICULT HAVE THESE PROBLEMS MADE IT FOR YOU TO DO YOUR WORK, TAKE CARE OF THINGS AT HOME, OR GET ALONG WITH OTHER PEOPLE: EXTREMELY DIFFICULT
SUM OF ALL RESPONSES TO PHQ QUESTIONS 1-9: 22
5. POOR APPETITE OR OVEREATING: NEARLY EVERY DAY
2. FEELING DOWN, DEPRESSED OR HOPELESS: NEARLY EVERY DAY
9. THOUGHTS THAT YOU WOULD BE BETTER OFF DEAD, OR OF HURTING YOURSELF: SEVERAL DAYS

## 2025-04-14 ASSESSMENT — PAIN DESCRIPTION - LOCATION: LOCATION: HEAD

## 2025-04-14 ASSESSMENT — SLEEP AND FATIGUE QUESTIONNAIRES
SLEEP PATTERN: DIFFICULTY FALLING ASLEEP;DISTURBED/INTERRUPTED SLEEP
DO YOU USE A SLEEP AID: YES
DO YOU HAVE DIFFICULTY SLEEPING: YES

## 2025-04-14 ASSESSMENT — LIFESTYLE VARIABLES
HOW OFTEN DO YOU HAVE A DRINK CONTAINING ALCOHOL: NEVER
HOW MANY STANDARD DRINKS CONTAINING ALCOHOL DO YOU HAVE ON A TYPICAL DAY: PATIENT DOES NOT DRINK

## 2025-04-14 ASSESSMENT — PAIN DESCRIPTION - DESCRIPTORS: DESCRIPTORS: ACHING

## 2025-04-14 ASSESSMENT — PAIN DESCRIPTION - ORIENTATION: ORIENTATION: UPPER

## 2025-04-14 ASSESSMENT — PAIN - FUNCTIONAL ASSESSMENT: PAIN_FUNCTIONAL_ASSESSMENT: NONE - DENIES PAIN

## 2025-04-14 NOTE — ED TRIAGE NOTES
Pt presents to the ER with c/o suicidal thoughts for the last few days. Pt states she stopped taking all her medications 8 months ago d/t \"thinking I was better.\" When asked about recent triggers, pt gives no response. Pt reports a plan to \"overdose or slit my wrists.\" Pt denies drug or alcohol use and has no outpatient treatment. Pt is alert, calm and cooperative with triage. VSS    Patient placed in safe room that is ligature resistant with continuous monitoring in place. Provider notified, requested an assessment by behavioral health . Patient belongings secured in a locked lockers outside of the room. Explained suicide prevention precautions to the patient including constant observer. Level A paged

## 2025-04-14 NOTE — ED NOTES
Pt medicated per MAR. Pt resting in bed, lights dimmed and blanket provided. Pt remains in safe room with continuous video monitoring by campus police.

## 2025-04-14 NOTE — ED PROVIDER NOTES
I performed a history and physical examination of the patient and discussed management with the resident. I reviewed the resident’s note and agree with the documented findings and plan of care. Any areas of disagreement are noted on the chart. I was personally present for the key portions of any procedures. I have documented in the chart those procedures where I was not present during the key portions. I have reviewed the emergency nurses triage note. I agree with the chief complaint, past medical history, past surgical history, allergies, medications, social and family history as documented unless otherwise noted below. Documentation of the HPI, Physical Exam and Medical Decision Making performed by medical students or scribes is based on my personal performance of the HPI, PE and MDM. For Phys Assistant/ Nurse Practitioner cases/documentation I have personally evaluated this patient and have completed at least one if not all key elements of the E/M (history, physical exam, and MDM). My findings are as noted below.    In other words, I personally saw and examined the patient I have reviewed and agreed with the resident findings including all diagnostic interpretations and treatment plans as written.  I was present for the key portion of any procedures performed and the inclusive time noted in any critical care statement.    Patient presents today for suicidal ideation.  This is a 45-year-old female coming in with suicidal thoughts.  She states she stopped taking her medications approximately 8 months ago she has not seen her psychologist or psychiatrist.  Patient states that she just does not want to be here.  She did not give me a plan however she told SRAVANTHI and she told the nurses that she was either duggan overdose or slit her wrist.  Patient denies any alcohol or drugs.  Patient denies being pregnant.  No physical complaints at this time.  Patient is otherwise resting comfortably on the cot no apparent distress no 
pain, right upper quadrant     Acute cystitis 11/05/2016    Allergic rhinitis     Anxiety     Arthritis     CAD (coronary artery disease)     Depression (emotion)     DUB (dysfunctional uterine bleeding)     Generalized anxiety disorder     GERD (gastroesophageal reflux disease)     Hypertriglyceridemia 11/11/2014    Hypoglycemia     Hypothyroid 08/10/2014    Movement disorder     Pancreatitis 11/10/2014    PONV (postoperative nausea and vomiting)     Psychiatric problem     Scoliosis     Tobacco abuse        ED Medications administered this visit:   Medications   diphenhydrAMINE (BENADRYL) tablet 50 mg (50 mg Oral Given 4/14/25 1800)   metoclopramide (REGLAN) tablet 10 mg (10 mg Oral Given 4/14/25 1800)       Consultants: Dr. Savage    Final Assessment and Plan:   Pt medically cleared for psych admission/eval    MEDICATION CHANGES   DISCHARGE MEDICATIONS:  New Prescriptions    No medications on file          FINAL DISPOSITION     Final diagnoses:   Mental health-related complaint     Condition: stable  Dispo: pending psych eval    PATIENT REFERRED TO:  No follow-up provider specified.    The results of pertinent diagnostic studies and exam findings were discussed. The patient’s provisional diagnosis and plan of care were discussed with the patient who expressed understanding.     PROCEDURES: (None if blank)  Procedures:     This transcription was electronically signed. Parts of this transcriptions may have been dictated by use of voice recognition software and electronically transcribed, and parts may have been transcribed with the assistance of an ED scribe. The transcription may contain errors not detected in proofreading.    Electronically Signed: Trudi Baker MD, 04/14/25, 7:51 PM

## 2025-04-15 PROBLEM — F41.0 PANIC DISORDER WITHOUT AGORAPHOBIA: Status: ACTIVE | Noted: 2020-04-20

## 2025-04-15 LAB
DEPRECATED MEAN GLUCOSE BLD GHB EST-ACNC: 105 MG/DL (ref 70–126)
HBA1C MFR BLD HPLC: 5.5 % (ref 4–6)

## 2025-04-15 PROCEDURE — 6370000000 HC RX 637 (ALT 250 FOR IP): Performed by: PSYCHIATRY & NEUROLOGY

## 2025-04-15 PROCEDURE — 83036 HEMOGLOBIN GLYCOSYLATED A1C: CPT

## 2025-04-15 PROCEDURE — 1240000000 HC EMOTIONAL WELLNESS R&B

## 2025-04-15 PROCEDURE — 36415 COLL VENOUS BLD VENIPUNCTURE: CPT

## 2025-04-15 RX ORDER — LEVOTHYROXINE SODIUM 50 UG/1
50 TABLET ORAL
Status: DISCONTINUED | OUTPATIENT
Start: 2025-04-16 | End: 2025-04-15

## 2025-04-15 RX ORDER — CITALOPRAM HYDROBROMIDE 20 MG/1
20 TABLET ORAL DAILY
Status: DISCONTINUED | OUTPATIENT
Start: 2025-04-15 | End: 2025-04-17

## 2025-04-15 RX ORDER — METFORMIN HYDROCHLORIDE 500 MG/1
500 TABLET, EXTENDED RELEASE ORAL
Status: DISCONTINUED | OUTPATIENT
Start: 2025-04-16 | End: 2025-04-15

## 2025-04-15 RX ORDER — LEVOTHYROXINE SODIUM 50 UG/1
50 TABLET ORAL
Status: DISCONTINUED | OUTPATIENT
Start: 2025-04-16 | End: 2025-04-18 | Stop reason: HOSPADM

## 2025-04-15 RX ADMIN — TRAZODONE HYDROCHLORIDE 50 MG: 50 TABLET ORAL at 21:09

## 2025-04-15 RX ADMIN — HYDROXYZINE HYDROCHLORIDE 50 MG: 25 TABLET, FILM COATED ORAL at 17:41

## 2025-04-15 RX ADMIN — HYDROXYZINE HYDROCHLORIDE 50 MG: 25 TABLET, FILM COATED ORAL at 21:09

## 2025-04-15 ASSESSMENT — SLEEP AND FATIGUE QUESTIONNAIRES
AVERAGE NUMBER OF SLEEP HOURS: 6
DO YOU USE A SLEEP AID: YES
DO YOU HAVE DIFFICULTY SLEEPING: YES
SLEEP PATTERN: DIFFICULTY FALLING ASLEEP;DISTURBED/INTERRUPTED SLEEP

## 2025-04-15 ASSESSMENT — PATIENT HEALTH QUESTIONNAIRE - PHQ9
SUM OF ALL RESPONSES TO PHQ QUESTIONS 1-9: 22
7. TROUBLE CONCENTRATING ON THINGS, SUCH AS READING THE NEWSPAPER OR WATCHING TELEVISION: MORE THAN HALF THE DAYS
6. FEELING BAD ABOUT YOURSELF - OR THAT YOU ARE A FAILURE OR HAVE LET YOURSELF OR YOUR FAMILY DOWN: MORE THAN HALF THE DAYS
2. FEELING DOWN, DEPRESSED OR HOPELESS: NEARLY EVERY DAY
5. POOR APPETITE OR OVEREATING: NEARLY EVERY DAY
10. IF YOU CHECKED OFF ANY PROBLEMS, HOW DIFFICULT HAVE THESE PROBLEMS MADE IT FOR YOU TO DO YOUR WORK, TAKE CARE OF THINGS AT HOME, OR GET ALONG WITH OTHER PEOPLE: VERY DIFFICULT
3. TROUBLE FALLING OR STAYING ASLEEP: NEARLY EVERY DAY
SUM OF ALL RESPONSES TO PHQ QUESTIONS 1-9: 22
8. MOVING OR SPEAKING SO SLOWLY THAT OTHER PEOPLE COULD HAVE NOTICED. OR THE OPPOSITE, BEING SO FIGETY OR RESTLESS THAT YOU HAVE BEEN MOVING AROUND A LOT MORE THAN USUAL: MORE THAN HALF THE DAYS
SUM OF ALL RESPONSES TO PHQ QUESTIONS 1-9: 22
1. LITTLE INTEREST OR PLEASURE IN DOING THINGS: MORE THAN HALF THE DAYS
SUM OF ALL RESPONSES TO PHQ QUESTIONS 1-9: 19
9. THOUGHTS THAT YOU WOULD BE BETTER OFF DEAD, OR OF HURTING YOURSELF: NEARLY EVERY DAY
4. FEELING TIRED OR HAVING LITTLE ENERGY: MORE THAN HALF THE DAYS

## 2025-04-15 NOTE — FLOWSHEET NOTE
04/15/25 1157   Treatment Team Notification   Reason for Communication Patient/Family request   Name of Team Member Notified Dr. Savage   Treatment Team Role Attending Provider   Method of Communication Call   Response See orders   Notification Time 1157     Patient requested \"home medications.\" Some medications approved to order by physician. Patient then stated that she does not take or want Metformin. Pharmacist also confirmed that it has not been ordered in approximately 1 year. Medication discontinued.

## 2025-04-15 NOTE — PATIENT CARE CONFERENCE
Psychosocial Assessment    Current Level of Psychosocial Functioning     Independent XXX  Dependent    Minimal Assist     Comments:      Psychosocial High Risk Factors (check all that apply)    Unable to obtain meds   Chronic illness/pain    Substance abuse   Lack of Family Support XXX  Financial stress XXX  Isolation   Inadequate Community Resources  Suicide attempt(s) XXX history  Not taking medications XXX  Victim of crime   Developmental Delay  Unable to manage personal needs    Age 65 or older   Homeless   No transportation  XXX  Readmission within 30 days  Unemployment XXX  Traumatic Event    Family/Supports identified: Patient identified one friend as support.    Sexual Orientation:  Heterosexual    Patient Personal Strengths: Patient is looking for help.    Patient Protective Factors: Patient is looking for help.    Patient Risk Factors: Patient does not have a good support system, does not have transportation, does not feel that anything is working out in her life,     Safety plan:  Discussed the safety planning process with the patient.    CMHC/MH history: Patient denies any previous inpatient psychiatric admissions or suicide attempts    Plan of Care:  medication management, group/individual therapies, family meetings, psycho -education, treatment team meetings to assist with stabilization    Initial Discharge Plan:  Patient will return home with friend. Patient is connected with [facility]. Patient has a psychiatrist Yung Weir.    Clinical Summary:  Rozina \"Jeanne\" is a 45 year old female that was admitted from ED voluntarily. Patient states she just dont want to live any more. Patient says nothing is working out in her life, her past jobs failed, past relationships failed, her family does not show support.  Patient says she does not have support. Patient states her last hope was her friend that is giving up hope on

## 2025-04-15 NOTE — BH NOTE
Pt declined to attend [address] group therapy session offered today. Pt was provided maximum verbal encouragement to attend group therapy session. Pt remained resting in bed.

## 2025-04-15 NOTE — BH NOTE
INPATIENT RECREATIONAL THERAPY  ADULT BEHAVIORAL SERVICES  EVALUATION    REFERRING PHYSICIAN:  Jamshid Savage MD  DIAGNOSIS: MDD, recurrent severe, without psychosis     PRECAUTIONS:  standard  HISTORY OF PRESENT ILLNESS/INJURY:   The patient is a 44 y/o  female.  She has been having suicidal thoughts for about 2 or 3 days with plan to take an overdose. She says she did not do it since she was concerned that she may not succeed to hurt herself. She has been living with those friends for a few months after breaking up with her boyfriend, but those friends are in the process of getting a divorce and she has to find somewhere to move. She has been feeling depressed for about 3 months. She lost her job at [employer] about 2 months ago. She reports trouble getting and staying asleep. She feels hopeless and worthless.   PMH:  Please see medical chart for prior medical history, allergies, and medicatio    HISTORY OF PSYCHIATRIC TREATMENT:  This is her first psychiatric admission. She cut herself a few times when she was 11 years old   YOB: 1980  GENDER:  female  MARITAL STATUS:    EMPLOYMENT STATUS:  She was working at [employer] for 10 months. She lost her job 2 months ago. Prior, she has worked in fast-food factories, caring for people with disabilities, etc.   LIVING SITUATION/SUPPORT:  She lives with a  couple and their 3 sons   EDUCATIONAL LEVEL: She graduated from high school. She has some college.   MEDICATION/DRUG USE: She reports a history of drinking alcohol, like a fifth every other day between age 29 to age 34. She had a history of cannabis use for about 5 years, but she stopped taking cannabis 3 months ago. She denies other illicit drug     LEISURE INTERESTS:  pt. Verbally states that she does not have leisure interests   ACTIVITY PREFERENCE: no preference   ACTIVITY TYPES:  indoor/outdoor/active/passive  COGNITION: A&xO4    COPING: poor  ATTENTION: fair  RELAXATION:

## 2025-04-16 PROCEDURE — 6370000000 HC RX 637 (ALT 250 FOR IP): Performed by: PSYCHIATRY & NEUROLOGY

## 2025-04-16 PROCEDURE — 1240000000 HC EMOTIONAL WELLNESS R&B

## 2025-04-16 RX ORDER — TRAZODONE HYDROCHLORIDE 50 MG/1
50 TABLET ORAL ONCE
Status: COMPLETED | OUTPATIENT
Start: 2025-04-16 | End: 2025-04-16

## 2025-04-16 RX ADMIN — TRAZODONE HYDROCHLORIDE 50 MG: 50 TABLET ORAL at 20:35

## 2025-04-16 RX ADMIN — TRAZODONE HYDROCHLORIDE 50 MG: 50 TABLET ORAL at 22:39

## 2025-04-16 RX ADMIN — HYDROXYZINE HYDROCHLORIDE 50 MG: 25 TABLET, FILM COATED ORAL at 09:01

## 2025-04-16 RX ADMIN — IBUPROFEN 400 MG: 400 TABLET, FILM COATED ORAL at 20:35

## 2025-04-16 RX ADMIN — LEVOTHYROXINE SODIUM 50 MCG: 0.05 TABLET ORAL at 06:41

## 2025-04-16 RX ADMIN — CITALOPRAM HYDROBROMIDE 20 MG: 20 TABLET ORAL at 08:54

## 2025-04-16 RX ADMIN — HYDROXYZINE HYDROCHLORIDE 50 MG: 25 TABLET, FILM COATED ORAL at 18:55

## 2025-04-16 ASSESSMENT — PAIN DESCRIPTION - PAIN TYPE: TYPE: ACUTE PAIN

## 2025-04-16 ASSESSMENT — PAIN DESCRIPTION - LOCATION: LOCATION: HEAD

## 2025-04-16 ASSESSMENT — PAIN DESCRIPTION - DESCRIPTORS: DESCRIPTORS: ACHING;DISCOMFORT

## 2025-04-16 ASSESSMENT — PAIN SCALES - GENERAL
PAINLEVEL_OUTOF10: 0
PAINLEVEL_OUTOF10: 9

## 2025-04-16 ASSESSMENT — PAIN DESCRIPTION - FREQUENCY: FREQUENCY: INTERMITTENT

## 2025-04-16 ASSESSMENT — PAIN DESCRIPTION - ONSET: ONSET: ON-GOING

## 2025-04-16 ASSESSMENT — PAIN DESCRIPTION - ORIENTATION: ORIENTATION: ANTERIOR

## 2025-04-16 ASSESSMENT — PAIN - FUNCTIONAL ASSESSMENT: PAIN_FUNCTIONAL_ASSESSMENT: ACTIVITIES ARE NOT PREVENTED
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
## 2025-04-17 PROCEDURE — 1240000000 HC EMOTIONAL WELLNESS R&B

## 2025-04-17 PROCEDURE — 6370000000 HC RX 637 (ALT 250 FOR IP): Performed by: PSYCHIATRY & NEUROLOGY

## 2025-04-17 RX ORDER — CITALOPRAM HYDROBROMIDE 20 MG/1
20 TABLET ORAL ONCE
Status: COMPLETED | OUTPATIENT
Start: 2025-04-17 | End: 2025-04-17

## 2025-04-17 RX ORDER — TRAZODONE HYDROCHLORIDE 50 MG/1
50 TABLET ORAL ONCE
Status: COMPLETED | OUTPATIENT
Start: 2025-04-17 | End: 2025-04-17

## 2025-04-17 RX ORDER — CITALOPRAM HYDROBROMIDE 40 MG/1
40 TABLET ORAL DAILY
Status: DISCONTINUED | OUTPATIENT
Start: 2025-04-18 | End: 2025-04-18 | Stop reason: HOSPADM

## 2025-04-17 RX ADMIN — CITALOPRAM HYDROBROMIDE 20 MG: 20 TABLET ORAL at 10:42

## 2025-04-17 RX ADMIN — CITALOPRAM HYDROBROMIDE 20 MG: 20 TABLET ORAL at 09:01

## 2025-04-17 RX ADMIN — HYDROXYZINE HYDROCHLORIDE 50 MG: 25 TABLET, FILM COATED ORAL at 20:38

## 2025-04-17 RX ADMIN — TRAZODONE HYDROCHLORIDE 50 MG: 50 TABLET ORAL at 21:45

## 2025-04-17 RX ADMIN — TRAZODONE HYDROCHLORIDE 50 MG: 50 TABLET ORAL at 20:38

## 2025-04-17 RX ADMIN — LEVOTHYROXINE SODIUM 50 MCG: 0.05 TABLET ORAL at 06:39

## 2025-04-17 RX ADMIN — HYDROXYZINE HYDROCHLORIDE 50 MG: 25 TABLET, FILM COATED ORAL at 09:04

## 2025-04-17 ASSESSMENT — PAIN SCALES - GENERAL
PAINLEVEL_OUTOF10: 0
PAINLEVEL_OUTOF10: 0

## 2025-04-17 NOTE — GROUP NOTE
Group Therapy Note    Date: 4/17/2025    Group Start Time: 1100  Group End Time: 1130  Group Topic: Recreational    [facility] 7E    Ny Willis [facility]        Group Therapy Note    Attendees: 11       Patient's Goal:  The patient will participate in pet therapy sessions involving interaction with a certified therapy dog to improve mood and social engagement.    Notes:  pt. Was pleasant and actively engaged in the interaction with the therapy dog. Patient petted the dog, maintained eye contact, and engaged in conversation with the handler and two other patients. Demonstrated relaxed posture and laughed during the interaction. Pt. Verbally discussed her love for animals. Pt. Was appreciative of the visit. Therapeutic benefit evident in emotional expression and verbal participation. Pt. Will continue to be encouraged to participate in planned TR sessions.     Status After Intervention:  Improved    Participation Level: Active Listener, Interactive, and Minimal    Participation Quality: Appropriate, Attentive, Sharing, and Supportive      Speech:  normal      Thought Process/Content: Logical  Linear      Affective Functioning: Blunted and Flat      Mood: euthymic      Level of consciousness:  Alert, Oriented x4, and Attentive      Response to Learning: Able to verbalize current knowledge/experience, Able to verbalize/acknowledge new learning, Able to retain information, Capable of insight, Able to change behavior, and Progressing to goal      Endings: None Reported    Modes of Intervention: Education, Support, Socialization, Exploration, Clarifying, Problem-solving, and Activity      Discipline Responsible: Recreational Therapist      Signature:  LOGAN Hatfield

## 2025-04-17 NOTE — GROUP NOTE
Group Therapy Note    Date: 4/17/2025    Group Start Time: 0900  Group End Time: 0930  Group Topic: Community Meeting    [facility]    Ny [facility]        Group Therapy Note    Attendees: 9       Patient's Goal:  I want to go home     Notes:  Pt is an active participant in group conversation with verbal cues and prompting from writer. Without verbal prompt, patient is an active listener. Pt. Was educated to have a conversation with the physician for discharge plans.      Status After Intervention:  Improved    Participation Level: Active Listener, Interactive, and Minimal    Participation Quality: Appropriate, Attentive, Sharing, and Supportive      Speech:  normal      Thought Process/Content: Logical  Linear      Affective Functioning: Blunted and Flat      Mood: euthymic      Level of consciousness:  Alert, Oriented x4, and Attentive      Response to Learning: Able to verbalize current knowledge/experience, Able to verbalize/acknowledge new learning, Able to retain information, Capable of insight, Able to change behavior, and Progressing to goal      Endings: None Reported    Modes of Intervention: Education, Support, Socialization, Exploration, Clarifying, Problem-solving, and Activity      Discipline Responsible: Recreational Therapist      Signature:  LOGAN Hatfield

## 2025-04-17 NOTE — PATIENT CARE CONFERENCE
Behavioral Health   Day 3 Interdisciplinary Treatment Plan NOTE    Review Date & Time: 4/16/25 1419    Patient was in treatment team    Admission Type:   Admission Type: Voluntary    Reason for admission:  Reason for Admission: Suicidal ideation  Estimated Length of Stay Update:  2-3 days  Estimated Discharge Date Update: 4/20/25    Patient Strengths/Barriers  Strengths (Must Choose Two): Spirituality, Support from friends  Barriers: Support from family, Motivation level for treatment  Addictive Behavior:Addictive Behavior  In the Past 3 Months, Have You Felt or Has Someone Told You That You Have a Problem With  : None  Medical Problems:  Past Medical History:   Diagnosis Date    Abdominal pain, right upper quadrant     Acute cystitis 11/05/2016    Allergic rhinitis     Anxiety     Arthritis     CAD (coronary artery disease)     Depression (emotion)     DUB (dysfunctional uterine bleeding)     Generalized anxiety disorder     GERD (gastroesophageal reflux disease)     Hypertriglyceridemia 11/11/2014    Hypoglycemia     Hypothyroid 08/10/2014    Movement disorder     Pancreatitis 11/10/2014    PONV (postoperative nausea and vomiting)     Psychiatric problem     Scoliosis     Tobacco abuse        Risk:  Fall Risk   Kp Scale Kp Scale Score: 20    Status EXAM:   Mental Status and Behavioral Exam  Normal: No  Level of Assistance: Independent/Self  Facial Expression: Flat, Sad  Affect: Blunt  Level of Consciousness: Alert  Frequency of Checks: 4 times per hour, close  Mood:Normal: No  Mood: Depressed, Anxious, Worthless, low self-esteem  Motor Activity:Normal: No  Motor Activity: Decreased  Eye Contact: Poor  Observed Behavior: Cooperative  Sexual Misconduct History: Current - no  Preception: [place] to person, [place] to time, [place] to place, [place] to situation  Attention:Normal: Yes  Attention: Distractible  Thought Processes: Circumstantial  Thought Content:Normal: Yes  Depression Symptoms: Change in energy

## 2025-04-18 VITALS
DIASTOLIC BLOOD PRESSURE: 76 MMHG | OXYGEN SATURATION: 98 % | TEMPERATURE: 97.7 F | HEART RATE: 64 BPM | RESPIRATION RATE: 17 BRPM | HEIGHT: 67 IN | SYSTOLIC BLOOD PRESSURE: 113 MMHG | WEIGHT: 215 LBS | BODY MASS INDEX: 33.74 KG/M2

## 2025-04-18 PROCEDURE — 6370000000 HC RX 637 (ALT 250 FOR IP): Performed by: PSYCHIATRY & NEUROLOGY

## 2025-04-18 PROCEDURE — 5130000000 HC BRIDGE APPOINTMENT

## 2025-04-18 RX ORDER — CITALOPRAM HYDROBROMIDE 40 MG/1
40 TABLET ORAL DAILY
Qty: 30 TABLET | Refills: 0 | Status: SHIPPED | OUTPATIENT
Start: 2025-04-19

## 2025-04-18 RX ORDER — TRAZODONE HYDROCHLORIDE 100 MG/1
100 TABLET ORAL NIGHTLY PRN
Status: DISCONTINUED | OUTPATIENT
Start: 2025-04-18 | End: 2025-04-18 | Stop reason: HOSPADM

## 2025-04-18 RX ORDER — TRAZODONE HYDROCHLORIDE 100 MG/1
100 TABLET ORAL NIGHTLY PRN
Qty: 30 TABLET | Refills: 0 | Status: SHIPPED | OUTPATIENT
Start: 2025-04-18

## 2025-04-18 RX ORDER — LEVOTHYROXINE SODIUM 50 UG/1
50 TABLET ORAL
Qty: 30 TABLET | Refills: 0 | Status: SHIPPED | OUTPATIENT
Start: 2025-04-19

## 2025-04-18 RX ORDER — HYDROXYZINE HYDROCHLORIDE 50 MG/1
50 TABLET, FILM COATED ORAL 3 TIMES DAILY PRN
Qty: 90 TABLET | Refills: 0 | Status: SHIPPED | OUTPATIENT
Start: 2025-04-18 | End: 2025-05-18

## 2025-04-18 RX ADMIN — CITALOPRAM 40 MG: 40 TABLET, FILM COATED ORAL at 09:36

## 2025-04-18 RX ADMIN — LEVOTHYROXINE SODIUM 50 MCG: 0.05 TABLET ORAL at 06:21

## 2025-04-18 NOTE — PLAN OF CARE
Problem: Self Harm/Suicidality  Goal: Will have no self-injury during hospital stay  Description: INTERVENTIONS:1.  Ensure constant observer at bedside with Q15M safety checks2.  Maintain a safe environment3.  Secure patient belongings4.  Ensure family/visitors adhere to safety recommendations5.  Ensure safety tray has been added to patient's diet order6.  Every shift and PRN: Re-assess suicidal risk via Frequent Screener  INTERVENTIONS:1.  Ensure constant observer at bedside with Q15M safety checks2.  Maintain a safe environment3.  Secure patient belongings4.  Ensure family/visitors adhere to safety recommendations5.  Ensure safety tray has been added to patient's diet order6.  Every shift and PRN: Re-assess suicidal risk via Frequent Screener  4/17/2025 0946 by Za Sharp RN  Outcome: Progressing  Flowsheets (Taken 4/17/2025 0013 by Ines Briggs RN)  Will have no self-injury during hospital stay:   Maintain a safe environment   Every shift and PRN: Re-assess suicidal risk via Frequent Screener  Note: Patient has no self-injury and denies suicidal thoughts while on the unit.   4/17/2025 0013 by Ines Briggs RN  Outcome: Progressing  Flowsheets (Taken 4/17/2025 0013)  Will have no self-injury during hospital stay:   Maintain a safe environment   Every shift and PRN: Re-assess suicidal risk via Frequent Screener  Note: Denies suicidal ideations.      Problem: Discharge Planning  Goal: Discharge to home or other facility with appropriate resources  4/17/2025 0946 by Za Sharp RN  Outcome: Progressing  Flowsheets (Taken 4/17/2025 0013 by Ines Briggs RN)  Discharge to home or other facility with appropriate resources: Identify barriers to discharge with patient and caregiver  Note: Patient identifies barriers to discharge.   4/17/2025 0013 by Ines Briggs RN  Outcome: Progressing  Flowsheets (Taken 4/17/2025 0013)  Discharge to home or other facility with appropriate resources: 
  Problem: Coping  Goal: Pt/Family able to verbalize concerns and demonstrate effective coping strategies  Description: INTERVENTIONS:1. Assist patient/family to identify coping skills, available support systems and cultural and spiritual values2. Provide emotional support, including active listening and acknowledgement of concerns of patient and caregivers3. Reduce environmental stimuli, as able4. Instruct patient/family in relaxation techniques, as appropriate5. Assess for spiritual pain/suffering and initiate Spiritual Care, Psychosocial Clinical Specialist consults as needed  4/17/2025 1612 by Ny Willis CTRS  Outcome: Progressing  Flowsheets (Taken 4/17/2025 1612)  Patient/family able to verbalize anxieties, fears, and concerns, and demonstrate effective coping:   Assist patient/family to identify coping skills, available support systems and cultural and spiritual values   Provide emotional support, including active listening and acknowledgement of concerns of patient and caregivers  Note: Pt has attended 4/4 group therapy sessions offered thus far today on the unit. Pt is engaging in group therapy conversation independently and socializes appropriately with his peers. Pt has been seen out on the unit interacting with others and participating in independent recreation resources out on the unit appropriately. Plan to continue to monitor progress and encourage group therapy participation and socialization on the unit.       Problem: Depression  Goal: Will be euthymic at discharge  Description: INTERVENTIONS:1. Administer medication as ordered2. Provide emotional support via 1:1 interaction with staff3. Encourage involvement in milieu/groups/activities4. Monitor for social isolation  4/17/2025 0946 by Za Sharp, RN  Outcome: Not Progressing  Note: Patient reports her depression as \"high\"     
  Problem: Coping  Goal: Pt/Family able to verbalize concerns and demonstrate effective coping strategies  Description: INTERVENTIONS:1. Assist patient/family to identify coping skills, available support systems and cultural and spiritual values2. Provide emotional support, including active listening and acknowledgement of concerns of patient and caregivers3. Reduce environmental stimuli, as able4. Instruct patient/family in relaxation techniques, as appropriate5. Assess for spiritual pain/suffering and initiate Spiritual Care, Psychosocial Clinical Specialist consults as needed  Outcome: Not Progressing  Flowsheets (Taken 4/15/2025 1328)  Patient/family able to verbalize anxieties, fears, and concerns, and demonstrate effective coping:   Assist patient/family to identify coping skills, available support systems and cultural and spiritual values   Provide emotional support, including active listening and acknowledgement of concerns of patient and caregivers  Note: Pt has attended 0/3 group therapy sessions offered thus far today. Pt has been given maximum verbal encouragement to attend group therapy sessions, pt has been isolative to his room the majority of the day. Intermittently, pt is seen out on the unit interacting with others. Plan to continue to monitor progress and encourage participation in group therapy programming.       
  Problem: Discharge Planning  Goal: Discharge to home or other facility with appropriate resources  4/15/2025 2323 by Fatimah Hillman RN  Outcome: Not Progressing  Note: Patient is unsure of discharge plan at this time, stating \"my friend kicked me out\".      Problem: Coping  Goal: Pt/Family able to verbalize concerns and demonstrate effective coping strategies  Description: INTERVENTIONS:1. Assist patient/family to identify coping skills, available support systems and cultural and spiritual values2. Provide emotional support, including active listening and acknowledgement of concerns of patient and caregivers3. Reduce environmental stimuli, as able4. Instruct patient/family in relaxation techniques, as appropriate5. Assess for spiritual pain/suffering and initiate Spiritual Care, Psychosocial Clinical Specialist consults as needed  4/15/2025 2323 by Fatimah Hillman RN  Outcome: Not Progressing  Note: Patient unable to verbalize coping skills at this time.      Problem: Depression  Goal: Will be euthymic at discharge  Description: INTERVENTIONS:1. Administer medication as ordered2. Provide emotional support via 1:1 interaction with staff3. Encourage involvement in milieu/groups/activities4. Monitor for social isolation  Outcome: Not Progressing  Note: Patient continues to report high depression, patient unable to rate it at this time. Patient reported her mood was a 0/10.      Problem: Anxiety  Goal: Will report anxiety at manageable levels  Description: INTERVENTIONS:1. Administer medication as ordered2. Teach and rehearse alternative coping skills3. Provide emotional support with 1:1 interaction with staff  Outcome: Not Progressing  Flowsheets (Taken 4/15/2025 2152)  Will report anxiety at manageable levels:   Administer medication as ordered   Provide emotional support with 1:1 interaction with staff  Note: Patient continues to report elevated anxiety, patient unable to rate it 0-10 at this time.      Problem: Self 
  Problem: Self Harm/Suicidality  Goal: Will have no self-injury during hospital stay  Description: INTERVENTIONS:1.  Ensure constant observer at bedside with Q15M safety checks2.  Maintain a safe environment3.  Secure patient belongings4.  Ensure family/visitors adhere to safety recommendations5.  Ensure safety tray has been added to patient's diet order6.  Every shift and PRN: Re-assess suicidal risk via Frequent Screener  INTERVENTIONS:1.  Ensure constant observer at bedside with Q15M safety checks2.  Maintain a safe environment3.  Secure patient belongings4.  Ensure family/visitors adhere to safety recommendations5.  Ensure safety tray has been added to patient's diet order6.  Every shift and PRN: Re-assess suicidal risk via Frequent Screener  4/16/2025 0819 by Za Sharp RN  Outcome: Progressing  Flowsheets (Taken 4/15/2025 2152 by Fatimah Hillman RN)  Will have no self-injury during hospital stay:   Maintain a safe environment   Every shift and PRN: Re-assess suicidal risk via Frequent Screener  Note: Patient has not had any self harm while admitted to the unit.   4/15/2025 2323 by Fatimah Hillman RN  Outcome: Progressing  Note: Patient has remained free from self-injury since admission.   4/15/2025 2209 by Fatimah Hillman RN  Flowsheets (Taken 4/15/2025 2152)  Will have no self-injury during hospital stay:   Maintain a safe environment   Every shift and PRN: Re-assess suicidal risk via Frequent Screener  Note: Patient has remained free from self-harm injuring behavior.      Problem: Discharge Planning  Goal: Discharge to home or other facility with appropriate resources  4/16/2025 0819 by Za Sharp RN  Outcome: Not Progressing  Flowsheets (Taken 4/15/2025 2152 by Fatimah Hillman RN)  Discharge to home or other facility with appropriate resources: Identify barriers to discharge with patient and caregiver  Note: Patient cannot identify barriers to discharge  4/15/2025 2323 by Fatimah Hillman RN  Outcome: Not 
  Problem: Self Harm/Suicidality  Goal: Will have no self-injury during hospital stay  Description: INTERVENTIONS:1.  Ensure constant observer at bedside with Q15M safety checks2.  Maintain a safe environment3.  Secure patient belongings4.  Ensure family/visitors adhere to safety recommendations5.  Ensure safety tray has been added to patient's diet order6.  Every shift and PRN: Re-assess suicidal risk via Frequent Screener  INTERVENTIONS:1.  Ensure constant observer at bedside with Q15M safety checks2.  Maintain a safe environment3.  Secure patient belongings4.  Ensure family/visitors adhere to safety recommendations5.  Ensure safety tray has been added to patient's diet order6.  Every shift and PRN: Re-assess suicidal risk via Frequent Screener  4/18/2025 1121 by Sol Rene RN  Outcome: Progressing  Note: No self harm behaviors were observed or reported so far this shift. Remains on every 15 minutes precautions for safety.  Patient denies suicidal ideations at present time    4/17/2025 2310 by Ines Briggs RN  Outcome: Progressing  Flowsheets (Taken 4/17/2025 2310)  Will have no self-injury during hospital stay:   Maintain a safe environment   Every shift and PRN: Re-assess suicidal risk via Frequent Screener  Note: Denies suicidal ideations this shift.      Problem: Discharge Planning  Goal: Discharge to home or other facility with appropriate resources  4/18/2025 1121 by Sol Rene RN  Outcome: Progressing  Note: Remains in hospital, discussed possible discharge needs.    4/17/2025 2310 by Ines Briggs RN  Outcome: Progressing  Flowsheets (Taken 4/17/2025 0013)  Discharge to home or other facility with appropriate resources: Identify barriers to discharge with patient and caregiver     Problem: Pain  Goal: Verbalizes/displays adequate comfort level or baseline comfort level  4/18/2025 1121 by Sol Rene RN  Outcome: Progressing  Note: Denies pain at present  4/17/2025 2310 by Brigitte 
  Problem: Self Harm/Suicidality  Goal: Will have no self-injury during hospital stay  Description: INTERVENTIONS:1.  Ensure constant observer at bedside with Q15M safety checks2.  Maintain a safe environment3.  Secure patient belongings4.  Ensure family/visitors adhere to safety recommendations5.  Ensure safety tray has been added to patient's diet order6.  Every shift and PRN: Re-assess suicidal risk via Frequent Screener  INTERVENTIONS:1.  Ensure constant observer at bedside with Q15M safety checks2.  Maintain a safe environment3.  Secure patient belongings4.  Ensure family/visitors adhere to safety recommendations5.  Ensure safety tray has been added to patient's diet order6.  Every shift and PRN: Re-assess suicidal risk via Frequent Screener  4/18/2025 1330 by Sol Rene RN  Outcome: Completed  4/18/2025 1121 by Sol Rene RN  Outcome: Progressing  Note: No self harm behaviors were observed or reported so far this shift. Remains on every 15 minutes precautions for safety.  Patient denies suicidal ideations at present time       Problem: Discharge Planning  Goal: Discharge to home or other facility with appropriate resources  4/18/2025 1330 by Sol Rene RN  Outcome: Completed  4/18/2025 1121 by Sol Rene RN  Outcome: Progressing  Note: Remains in hospital, discussed possible discharge needs.       Problem: Pain  Goal: Verbalizes/displays adequate comfort level or baseline comfort level  4/18/2025 1330 by Sol Rene RN  Outcome: Completed  4/18/2025 1121 by Sol Rene RN  Outcome: Progressing  Note: Denies pain at present     Problem: Coping  Goal: Pt/Family able to verbalize concerns and demonstrate effective coping strategies  Description: INTERVENTIONS:1. Assist patient/family to identify coping skills, available support systems and cultural and spiritual values2. Provide emotional support, including active listening and acknowledgement of concerns of patient and 
  Problem: Self Harm/Suicidality  Goal: Will have no self-injury during hospital stay  Description: INTERVENTIONS:1.  Ensure constant observer at bedside with Q15M safety checks2.  Maintain a safe environment3.  Secure patient belongings4.  Ensure family/visitors adhere to safety recommendations5.  Ensure safety tray has been added to patient's diet order6.  Every shift and PRN: Re-assess suicidal risk via Frequent Screener  INTERVENTIONS:1.  Ensure constant observer at bedside with Q15M safety checks2.  Maintain a safe environment3.  Secure patient belongings4.  Ensure family/visitors adhere to safety recommendations5.  Ensure safety tray has been added to patient's diet order6.  Every shift and PRN: Re-assess suicidal risk via Frequent Screener  Outcome: Not Progressing  Flowsheets (Taken 4/15/2025 0800)  Will have no self-injury during hospital stay:   Maintain a safe environment   Every shift and PRN: Re-assess suicidal risk via Frequent Screener  Note: Patient remains safe and free from harm. Suicidal intent or plan denied. Patient states: \"I just don't want to be alive.\"       Problem: Discharge Planning  Goal: Discharge to home or other facility with appropriate resources  Outcome: Not Progressing  Flowsheets (Taken 4/15/2025 0800)  Discharge to home or other facility with appropriate resources:   Identify barriers to discharge with patient and caregiver   Identify discharge learning needs (meds, wound care, etc)   Arrange for needed discharge resources and transportation as appropriate  Note: Discharge planning is in progress.      Problem: Coping  Goal: Pt/Family able to verbalize concerns and demonstrate effective coping strategies  Description: INTERVENTIONS:1. Assist patient/family to identify coping skills, available support systems and cultural and spiritual values2. Provide emotional support, including active listening and acknowledgement of concerns of patient and caregivers3. Reduce environmental 
  Problem: Self Harm/Suicidality  Goal: Will have no self-injury during hospital stay  Description: INTERVENTIONS:1.  Ensure constant observer at bedside with Q15M safety checks2.  Maintain a safe environment3.  Secure patient belongings4.  Ensure family/visitors adhere to safety recommendations5.  Ensure safety tray has been added to patient's diet order6.  Every shift and PRN: Re-assess suicidal risk via Frequent Screener  INTERVENTIONS:1.  Ensure constant observer at bedside with Q15M safety checks2.  Maintain a safe environment3.  Secure patient belongings4.  Ensure family/visitors adhere to safety recommendations5.  Ensure safety tray has been added to patient's diet order6.  Every shift and PRN: Re-assess suicidal risk via Frequent Screener  Outcome: Progressing  Flowsheets (Taken 4/17/2025 0013)  Will have no self-injury during hospital stay:   Maintain a safe environment   Every shift and PRN: Re-assess suicidal risk via Frequent Screener  Note: Denies suicidal ideations.      Problem: Discharge Planning  Goal: Discharge to home or other facility with appropriate resources  Outcome: Progressing  Flowsheets (Taken 4/17/2025 0013)  Discharge to home or other facility with appropriate resources: Identify barriers to discharge with patient and caregiver     Problem: Pain  Goal: Verbalizes/displays adequate comfort level or baseline comfort level  Outcome: Not Progressing  Flowsheets (Taken 4/17/2025 0013)  Verbalizes/displays adequate comfort level or baseline comfort level:   Assess pain using appropriate pain scale   Encourage patient to monitor pain and request assistance  Note: Complained of a headache. Rates her pain a 9 with being the worst. Requested motrin per order.      Problem: Coping  Goal: Pt/Family able to verbalize concerns and demonstrate effective coping strategies  Description: INTERVENTIONS:1. Assist patient/family to identify coping skills, available support systems and cultural and spiritual 
support, including active listening and acknowledgement of concerns of patient and caregivers  Note: Pt has attended 4/4 group therapy sessions offered thus far today on the unit. Pt is engaging in group therapy conversation independently and socializes appropriately with his peers. Pt has been seen out on the unit interacting with others and participating in independent recreation resources out on the unit appropriately. Plan to continue to monitor progress and encourage group therapy participation and socialization on the unit.    4/17/2025 0946 by Za Sharp, RN  Outcome: Progressing  Flowsheets (Taken 4/17/2025 0013 by Ines Briggs RN)  Patient/family able to verbalize anxieties, fears, and concerns, and demonstrate effective coping: Reduce environmental stimuli, as able  Note: Patient able to verbalize effective coping strategies.      Problem: Chronic Conditions and Co-morbidities  Goal: Patient's chronic conditions and co-morbidity symptoms are monitored and maintained or improved  4/17/2025 2310 by Ines Briggs RN  Outcome: Progressing  Flowsheets (Taken 4/17/2025 0013)  Care Plan - Patient's Chronic Conditions and Co-Morbidity Symptoms are Monitored and Maintained or Improved: Monitor and assess patient's chronic conditions and comorbid symptoms for stability, deterioration, or improvement  4/17/2025 0946 by Za Sharp RN  Outcome: Progressing  Flowsheets (Taken 4/17/2025 0013 by Ines Briggs RN)  Care Plan - Patient's Chronic Conditions and Co-Morbidity Symptoms are Monitored and Maintained or Improved: Monitor and assess patient's chronic conditions and comorbid symptoms for stability, deterioration, or improvement  Note: Patient's chronic conditions and co-morbidity symptoms are monitored and maintained.      Problem: Depression  Goal: Will be euthymic at discharge  Description: INTERVENTIONS:1. Administer medication as ordered2. Provide emotional support via 1:1 interaction with

## 2025-04-18 NOTE — DISCHARGE INSTR - DIET

## 2025-04-18 NOTE — DISCHARGE SUMMARY
Physician Discharge Summary     Patient ID:  Rozina Reynolds  [id number]  45 y.o.  1980    Admit date: 4/14/2025    Discharge date and time: 4/18/2025  12:23 PM     Admitting Physician: Jamshid Savage MD     Discharge Physician: Jamshid Savage MD      Admission Diagnoses: MDD (major depressive disorder), recurrent severe, without psychosis (HCC) [F33.2]  Mental health-related complaint [Z71.1]    IDENTIFYING INFORMATION: ***    HISTORY OF PRESENT ILLNESS: ***    MENTAL STATUS EXAMINATION AT ADMISSION: See H and P.    Discharge Diagnoses:   MDD (major depressive disorder), recurrent severe, without psychosis (HCC)     Past Medical History:   Diagnosis Date    Abdominal pain, right upper quadrant     Acute cystitis 11/05/2016    Allergic rhinitis     Anxiety     Arthritis     CAD (coronary artery disease)     Depression (emotion)     DUB (dysfunctional uterine bleeding)     Generalized anxiety disorder     GERD (gastroesophageal reflux disease)     Hypertriglyceridemia 11/11/2014    Hypoglycemia     Hypothyroid 08/10/2014    Movement disorder     Pancreatitis 11/10/2014    PONV (postoperative nausea and vomiting)     Psychiatric problem     Scoliosis     Tobacco abuse         Admission Condition: poor    Discharged Condition: stable    Indication for Admission: threat to self    Significant Diagnostic Studies:   See Results Review tab in EHR      TREATMENT AND CLINICAL COURSE:   Patient was admitted on the unit. Routine lab was ordered. Physical examination was within normal limits. At admission, patient was started on ***; Hydroxyzine & Trazodone were added. Patient did not have side effect from medications. Patient was involved in group and milieu therapy. Although patient was suicidal upon admission, patient did not have suicidal thought during this hospital stay. I strongly encouraged patient's sobriety from illicit drugs, cannabis and alcohol.  I spent some time discussing the effect of illicit drugs,

## 2025-04-18 NOTE — PROGRESS NOTES
Group Therapy Note    Date: 4/17/2023  Start Time: 0930  End Time:  1000  Number of Participants: 9    Type of Group: Psychotherapy      Notes:  Patient was present in group. Group members discussed the topic of regulating emotions and some techniques. Members discussed reasons why regulating emotions can be beneficial and ways that this can be done.      Status After Intervention:  Improved    Participation Level: Active Listener    Participation Quality: Appropriate, Attentive, Sharing, and Supportive      Speech:  normal      Thought Process/Content: Logical      Affective Functioning: Congruent      Mood: euthymic      Level of consciousness:  Alert, Oriented x4, and Attentive      Response to Learning: Able to verbalize current knowledge/experience, Able to verbalize/acknowledge new learning, Able to retain information, Capable of insight, Able to change behavior, and Progressing to goal      Endings: None Reported    Modes of Intervention: Education, Support, Socialization, Exploration, Clarifying, and Problem-solving      Discipline Responsible: /Counselor      Signature:  Anabel Bautista  
                                                                    Group Therapy Note    Date: 4/18/2025  Start Time: 0930  End Time:  1030  Number of Participants: 7    Type of Group: Psychotherapy    Notes:  Patient was present in group. Group members discussed the topic of identity. Members defined identity and discussed the role the topic plays on our daily lives. Members discussed the effects of basing our sense of selves on others. Members discussed purpose in relation to the topic.     Status After Intervention:  Improved    Participation Level: Active Listener and Interactive    Participation Quality: Appropriate, Attentive, Sharing, and Supportive      Speech:  normal      Thought Process/Content: Logical  Linear      Affective Functioning: Congruent      Mood: euthymic      Level of consciousness:  Alert, Oriented x4, and Attentive      Response to Learning: Able to verbalize current knowledge/experience, Able to verbalize/acknowledge new learning, Able to retain information, Capable of insight, Able to change behavior, and Progressing to goal      Endings: None Reported    Modes of Intervention: Education, Support, Socialization, Exploration, Clarifying, and Problem-solving      Discipline Responsible: /Counselor      Signature:  IRWIN Aldridge  
                Goal Wrap-Up/Relaxation Group    Date: 4/17/2025  Start Time: 2000  End Time:  2020    Type of Group: Goal Wrap Up and Relaxation    States that goal today was: to get discharged     Goal for today was Still working on it     Patient Participated in group/activities appropriately      Signature: Ines Briggs RN  
  Spiritual Support Group Note    Number of Participants in Group: 1                                  Goal: The course today focused on guilt words and how to work on ones inner monologue to assist in behavioral health.  This care focused on guilt words like Would, Could and should and how they can signs of moral injury. This was explored and how changes of perspectives can enable a healthier life. The participants also explored re framing PTSD and avoiding addiction triggers in our own heads.       Topic:  [x] Spiritual Wellness and Self Care                  [] Hope                     [] Connecting with Divine/Others        [] Thankfulness and Gratitude               [x]  Meaningfulness and Purpose               [] Forgiveness               [x] Peace               [] Connect to Community     [] Other:    Participation Level:   [x] Active Listener   [] Minimal   [] Monopolizing   [x] Interactive   [] No Participation   []  Other:     Attention:   [x] Alert   [] Distractible   [] Drowsy   [] Poor   [] Other:    Manner:   [x] Cooperative   [] Suspicious   [] Withdrawn   [] Guarded   [] Irritable   [] Inhospitable   [] Other:     Others Comments from   
1942  Pt medically cleared by Dr. Baker.     1957  Perfect Serve to Dr. Savage.    2004  Consult with Dr. Savage who recommend inpatient psychiatric treatment and authorized admission to .     2010  Pt updated, reviewed, agreed with and signed the voluntary admission form.    2013  Dr. Baker updated.    2014  Call to 7E, report given to Nurse Roque.  
24 hour chart review completed.   
24 hr chart review completed   
24 hr chart review completed   
Admission Date: 4/14/2025   Legal Status: vol  Guardian: No  Lay Caregiver: No  OQ completed upon admission:  Yes  Insurance: n/a  Needs [facility]: Yes  Valuables envelope: yes  Out Pt provider: n/a   Consents signed: Yes    Reason for admission Suicidal ideations .  
At 2042 patient is requesting another Trazodone 50 mg. States that it worked last evening for her and that she takes Trazodone 100 mg at home.  Perfect serve message sent to  Dr Savage see new orders.   
Behavioral Health   Discharge Note    Pt discharged with followings belongings:   Dental Appliances: None  Vision - Corrective Lenses: None  Hearing Aid: None  Jewelry: None  Body Piercings Removed: N/A  Clothing: Jacket/Coat  Other Valuables: Other (Comment), Money (478.16)   Valuables retrieved from safe, security envelope number:  [id number] and returned to patient.  Patient left department with self via cab.  Discharged to home. \"An Important Message from Medicare About Your Rights\" (IMM) form photocopy original from admission and provided to pt at least 4 hours prior to discharge N/A. \"An Important Message from [employer] About Your Rights\" (IMM) form photocopy original from admission. N/A. If pt left within 4 hours of receiving 2nd delivery of IMM, this is because pt was agreeable with hospital discharge.  Patient/guardian education on aftercare instructions: Yes  Bridge appointment completed:  yes.  Reviewed Discharge Instructions with patient/family/nursing facility.  Patient/family verbalizes understanding and agreement with the discharge plan using the teachback method.   I Patient/family verbalize understanding of AVS:Yes    Status EXAM upon discharge:  Mental Status and Behavioral Exam  Normal: No  Level of Assistance: Independent/Self  Facial Expression: Flat, Sad  Affect: Blunt  Level of Consciousness: Alert  Frequency of Checks: 4 times per hour, close  Mood:Normal: No  Mood: Depressed, Anxious  Motor Activity:Normal: No  Motor Activity: Decreased  Eye Contact: Fair  Observed Behavior: Cooperative  Sexual Misconduct History: Current - no  Preception: [place] to time, [place] to person, [place] to place, [place] to situation  Attention:Normal: No  Attention: Distractible  Thought Processes: Circumstantial  Thought Content:Normal: No  Thought Content: Preoccupations  Depression Symptoms: Impaired concentration  Anxiety Symptoms: Generalized  Christina Symptoms: No problems reported or 
Behavioral Health  Initial Interdisciplinary Treatment Plan NOTE    REVIEW DATE AND TIME: 4/15/2025 1114    PATIENT was IN TREATMENT TEAM.  See Multidisciplinary Treatment Team sheet for participants.    ADMISSION TYPE:   Admission Type: Voluntary    REASON FOR ADMISSION:  Reason for Admission: Suicidal ideation      Estimated Length of Stay Update:  3-5days  Estimated Discharge Date Update: 4/16/2025    Patient Strengths/Barriers  Strengths (Must Choose Two): Spirituality, Support from friends  Barriers: Support from family, Motivation level for treatment  Addictive Behavior:Addictive Behavior  In the Past 3 Months, Have You Felt or Has Someone Told You That You Have a Problem With  : None  Medical Problems:  Past Medical History:   Diagnosis Date    Abdominal pain, right upper quadrant     Acute cystitis 11/05/2016    Allergic rhinitis     Anxiety     Arthritis     CAD (coronary artery disease)     Depression (emotion)     DUB (dysfunctional uterine bleeding)     Generalized anxiety disorder     GERD (gastroesophageal reflux disease)     Hypertriglyceridemia 11/11/2014    Hypoglycemia     Hypothyroid 08/10/2014    Movement disorder     Pancreatitis 11/10/2014    PONV (postoperative nausea and vomiting)     Psychiatric problem     Scoliosis     Tobacco abuse        EDUCATION:   Learner Progress Toward Treatment Goals: Reviewed results and recommendations of this team, Reviewed group plan and strategies, Reviewed signs, symptoms and risk of self harm and violent behavior, and Reviewed goals and plan of care    Method: Individual    Outcome: Verbalized understanding and Demonstrated Understanding    PATIENT GOALS: To get better.    OQ PRIORITIES IDENTIFIED BY THE PATIENT ON ADMISSION: (These are the symptoms that the patient has identified that are impacting them the most at the time of admission based on their own input on the OQ.  These priorities are to be included in all of their care while admitted.)        
Daily Progress Note  Jamshid Savage MD  4/17/2025    Reviewed patient's current plan of care and vital signs with nursing staff.  Sleep:  7.5 hours last night  Attending groups: No  No reported Suicidal thought. No interaction with peers & staff, isolative to her room. Mood 4 on a scale of 1 to 10 with 10 is feeling normal.  She has no hope.  she feels much better now after finding out that her friend is willing to take her back.  She is no longer having suicidal thoughts.  She would like to be discharged tomorrow.    SUBJECTIVE:    Patient is feeling better. SUICIDAL IDEATION denies suicidal ideation.  Patient does not have medication side effects.    ROS: Patient has new complaints:  No  Sleeping adequately:  Yes  Visitors: No    Mental Status Examination:  Patient is cooperative. Speech: Soft and Monotonous.  No abnormal movements, tics or mannerisms.  Mood dysthymic; affect appropriate. Suicidal ideation absent.  Homicidal ideations Absent.   Hallucinations Absent.  Delusions Absent. Thought Content: normal. Thought Processes: Goal oriented. Alert and oriented X 3.  Attention and concentration fair. MEMORY intact.  Insight and Judgement limited.      Data   height is 1.702 m (5' 7\") and weight is 97.5 kg (215 lb). Her oral temperature is 98 °F (36.7 °C). Her blood pressure is 99/74 and her pulse is 55. Her respiration is 16 and oxygen saturation is 98%.   Labs:            Medications  Current Facility-Administered Medications: levothyroxine (SYNTHROID) tablet 50 mcg, 50 mcg, Oral, QAM AC  citalopram (CELEXA) tablet 20 mg, 20 mg, Oral, Daily  acetaminophen (TYLENOL) tablet 650 mg, 650 mg, Oral, Q6H PRN  ibuprofen (ADVIL;MOTRIN) tablet 400 mg, 400 mg, Oral, Q6H PRN  polyethylene glycol (GLYCOLAX) packet 17 g, 17 g, Oral, Daily PRN  hydrOXYzine HCl (ATARAX) tablet 50 mg, 50 mg, Oral, TID PRN  traZODone (DESYREL) tablet 50 mg, 50 mg, Oral, Nightly PRN  aluminum & magnesium hydroxide-simethicone (MAALOX PLUS) 200-200-20 
Psychotherapy Group 0930- Encouraged patient to attend. Patient did not attend.   
Pt did not attend goal wrap up/relaxation group this shift. Isolated to bed this shift.   
Pt did not attend goal wrap up/relaxation group with the [facility] nursing students.   
Pt states she is unable to sleep requesting something else to help sleep. Perfect serve sent to Dr Savage see new orders.   
Spiritual Support Group Note    Number of Participants in Group: 5                                    Goal:   The spirituality group focused on utilizing a day of rest in one's weekly mental health routine. This lesson focused on sabbath as a day of reflection, journaling, community, and meditation. Sabbath as a day was also approached for the purpose of teaching boundaries in relation to time. Sabbath is also a boundary on unhealthy habits that inhibit mental health. The goal in the group is to establish a day every week to sabbath. This intentional creation of a day of reflection assists with creating a space to evaluate one's development and mental health care progress by using rest. Sabbathing was encouraged as a way to see ones stay in the hospital as well. The stay in behavioral health is a form of forced sabbath.    Topic:  [x] Spiritual Wellness and Self Care                  [] Hope                     [x] Connecting with Divine/Others        [] Thankfulness and Gratitude               []  Meaningfulness and Purpose               [] Forgiveness               [] Peace               [x] Connect to Community     [] Other:    Participation Level:   [x] Active Listener   [] Minimal   [] Monopolizing   [] Interactive   [x] No Participation   []  Other:     Attention:   [x] Alert   [] Distractible   [] Drowsy   [] Poor   [] Other:    Manner:   [] Cooperative   [] Suspicious   [x] Withdrawn   [] Guarded   [] Irritable   [] Inhospitable   [] Other:     
hydroxide-simethicone (MAALOX PLUS) 200-200-20 MG/5ML suspension 30 mL, 30 mL, Oral, Q6H PRN    ASSESSMENT  MDD (major depressive disorder), recurrent severe, without psychosis (HCC)     PLAN  Patient's symptoms are improving.  Continue with current medications, increase trazodone.  Attempt to develop insight  Psycho-education conducted.  Supportive Therapy conducted.  Probable discharge is today.  Follow-up at [facility].         
Syed, APRN-CNP but reports last appointment was 3 months ago. Patient reports that she has not been taking her psychiatric medications for 8 months.     Patient denies alcohol and substance use. Patient reports sleeping less than normal and has troubles falling and staying asleep. Patient reports taking Trazodone for her sleep, prescribed by GORGE Burdick. Patient reports having a bad appetite.     Provider Recommendation Information  Level of Care Disposition:    1752 - Consult with DO Cresencio on assessment  1819 - Labs Active - In process; Urine is completed   1828 - Gave patient food box and extra blanket  1900 - Handoff to Second Shift Clinician   
conditions . She stated that she has been living with a couple whom are said to be getting a divorce, and that she is stressing about where she will go from there.she is A+ O x4 , clear speech , good eye contact , not hopeful for the future and still has ideations without a plan .She was thinking of slitting her throat or OD on pills . She is seen in room now resting with eyes closed .            Doroteo Herndon RN       
index  on the Roche analyzer. Â The reported K+ level may be falsely increased. If  clinically warranted, recollection of the specimen is suggested.      Chloride 04/14/2025 108  98 - 111 meq/L Final    CO2 04/14/2025 13 (L)  22 - 29 meq/L Final    Glucose 04/14/2025 85  74 - 109 mg/dL Final    BUN 04/14/2025 16  8 - 23 mg/dL Final    Creatinine 04/14/2025 1.0 (H)  0.5 - 0.9 mg/dL Final    Calcium 04/14/2025 9.5  8.6 - 10.0 mg/dL Final    Performed at [facility] [address] [place] [address]    WBC 04/14/2025 7.6  4.8 - 10.8 thou/mm3 Final    RBC 04/14/2025 4.77  4.20 - 5.40 mill/mm3 Final    Hemoglobin 04/14/2025 14.3  12.0 - 16.0 gm/dl Final    Hematocrit 04/14/2025 44.0  37.0 - 47.0 % Final    MCV 04/14/2025 92.2  81.0 - 99.0 fL Final    MCH 04/14/2025 30.0  26.0 - 33.0 pg Final    MCHC 04/14/2025 32.5  32.2 - 35.5 gm/dl Final    RDW-CV 04/14/2025 12.8  11.5 - 14.5 % Final    RDW-SD 04/14/2025 42.9  35.0 - 45.0 fL Final    Platelets 04/14/2025 287  130 - 400 thou/mm3 Final    MPV 04/14/2025 9.9  9.4 - 12.4 fL Final    Seg Neutrophils 04/14/2025 58.9  % Final    Lymphocytes 04/14/2025 33.6  % Final    Monocytes % 04/14/2025 5.2  % Final    Eosinophils 04/14/2025 1.0  % Final    Basophils 04/14/2025 1.2  % Final    Immature Granulocytes % 04/14/2025 0.1  % Final    Neutrophils Absolute 04/14/2025 4.5  1.8 - 7.7 thou/mm3 Final    Lymphocytes Absolute 04/14/2025 2.6  1.0 - 4.8 thou/mm3 Final    Monocytes Absolute 04/14/2025 0.4  0.4 - 1.3 thou/mm3 Final    Eosinophils Absolute 04/14/2025 0.1  0.0 - 0.4 thou/mm3 Final    Basophils Absolute 04/14/2025 0.1  0.0 - 0.1 thou/mm3 Final    Immature Grans (Abs) 04/14/2025 0.01  0.00 - 0.07 thou/mm3 Final    nRBC 04/14/2025 0  /100 wbc Final    Performed at [facility] [address] [place] [address]    Preg, Serum 04/14/2025 NEGATIVE  NEGATIVE Final    Performed at [facility] [address] [place] [address]    Glucose, Ur 04/14/2025

## 2025-04-18 NOTE — DISCHARGE INSTRUCTIONS
[facility] Hotline:  [phone]    Crisis phone numbers:  [facility], and [facility] [phone].  [facility], and [facility] [phone]  [facility] [phone].  [facility] [phone].  [facility] [phone].  [facility], [facility] [phone].    [facility]  [address], [place] [address]  [phone]    [facility]  [address] [facility]  [address], [place] [address]  [place], [place] [id number] [phone]    [facility]  [address] [address] [address] [facility] [place] [address]  [phone]    [facility]  [address] [place] [address]  [phone]    [facility]  [address] [place] [address]  [phone]    [facility]  [address], [place] [id number] [phone]    [facility]  [address] [place] [address]  [phone]    [facility]  Recovery and Well[facility]  [address] [place], [place] [id number] [phone]    [facility]  [address] [address]  [phone]    [address] [address]  [phone]    [facility]  [address] [place], [place] [address] [phone]    [facility]  [address] [place] [address]  [phone]    [facility]  [address] [place], [place] [address] [phone]

## 2025-04-18 NOTE — TRANSITION OF CARE
Behavioral Health Transition Record    Patient Name: Rozina Reynolds  YOB: 1980   Medical Record Number: [id number]  Date of Admission: 4/14/2025  5:04 PM   Date of Discharge: 4/18    Attending Provider: Jamshid Savage MD   Discharging Provider: Hussein  To contact this individual call  and ask the  to page.  If unavailable, ask to be transferred to Behavioral Health Provider on call.  A Behavioral Health Provider will be available on call 24/7 and during holidays.    Primary Care Provider: Hayde Herrera MD    Allergies   Allergen Reactions    Bee Pollen Swelling     Bee stings - extreme swelling at site       Reason for Admission: MDD    Admission Diagnosis: MDD (major depressive disorder), recurrent severe, without psychosis ([facility]) [F33.2]  Mental health-related complaint [Z71.1]    * No surgery found *    Results for orders placed or performed during the hospital encounter of 04/14/25   Urine Drug Screen   Result Value Ref Range    Amphetamine+Methamphetamine Urine Screen NEGATIVE NEGATIVE    Barbiturate Quant, Ur NEGATIVE NEGATIVE    Benzodiazepine Quant, Ur NEGATIVE NEGATIVE    Cannabinoid Quant, Ur POSITIVE NEGATIVE    Cocaine Metab Quant, Ur NEGATIVE NEGATIVE    Opiates, Urine NEGATIVE NEGATIVE    Oxycodone NEGATIVE NEGATIVE    Phencyclidine Quantitative Urine NEGATIVE NEGATIVE    Fentanyl NEGATIVE NEGATIVE   Salicylate   Result Value Ref Range    Salicylate Lvl < 0.5 (L) 2.0 - 10.0 mg/dL   Acetaminophen Level   Result Value Ref Range    Acetaminophen Level < 5.0 (L) 10.0 - 30.0 ug/mL   TSH   Result Value Ref Range    TSH 3.01 0.27 - 4.20 uIU/mL   Ethanol   Result Value Ref Range    Ethanol Lvl < 0.01 0.00 %   Basic Metabolic Panel   Result Value Ref Range    Sodium 139 135 - 145 meq/L    Potassium 3.5 3.5 - 5.2 meq/L    Chloride 108 98 - 111 meq/L    CO2 13 (L) 22 - 29 meq/L    Glucose 85 74 - 109 mg/dL    BUN 16 8 - 23 mg/dL    Creatinine 1.0 (H) 0.5 - 0.9 mg/dL    Calcium 9.5

## 2025-04-23 ENCOUNTER — TELEPHONE (OUTPATIENT)
Age: 45
End: 2025-04-23

## (undated) DEVICE — CYSTO/BLADDER IRRIGATION SET, REGULATING CLAMP

## (undated) DEVICE — HANDPIECE ABLAT DISP FOR ENDOMET SYS

## (undated) DEVICE — SUTURE V-LOC 180 SZ 2-0 L12IN ABSRB VLT GS-21 L37MM 1/2 CIR VLOCM0315

## (undated) DEVICE — Device

## (undated) DEVICE — SUTURE VCRL + SZ 4-0 L18IN ABSRB UD L19MM PS-2 3/8 CIR PRIM VCP496H

## (undated) DEVICE — PACK PROC LAP II AURORA

## (undated) DEVICE — ELECTRODE PT RET AD L9FT HI MOIST COND ADH HYDRGEL CORDED

## (undated) DEVICE — GLOVE SURG SZ 6 THK91MIL LTX FREE SYN POLYISOPRENE ANTI

## (undated) DEVICE — POSITIONER HD W8XH4XL8.5IN RASPBERRY FOAM SLT

## (undated) DEVICE — BLADELESS OBTURATOR: Brand: WECK VISTA

## (undated) DEVICE — TROCAR: Brand: KII FIOS FIRST ENTRY

## (undated) DEVICE — SURE SET SINGLE BASIN-LF: Brand: MEDLINE INDUSTRIES, INC.

## (undated) DEVICE — SOLUTION SCRB 4OZ 4% CHG H2O AIDED FOR PREOPERATIVE SKIN

## (undated) DEVICE — SUTURE VCRL + SZ 0 L27IN ABSRB UD CT-1 L36MM 1/2 CIR TAPR VCP260H

## (undated) DEVICE — TUBING, SUCTION, 1/4" X 20', STRAIGHT: Brand: MEDLINE INDUSTRIES, INC.

## (undated) DEVICE — SET LNR RED GRN W/ BASE CLEANASCOPE

## (undated) DEVICE — TRI-LUMEN FILTERED TUBE SET WITH ACTIVATED CHARCOAL FILTER: Brand: AIRSEAL

## (undated) DEVICE — Y-TYPE TUR/BLADDER IRRIGATION SET, REGULATING CLAMP

## (undated) DEVICE — SUTURE SZ 0 27IN 5/8 CIR UR-6  TAPER PT VIOLET ABSRB VICRYL J603H

## (undated) DEVICE — COLUMN DRAPE

## (undated) DEVICE — SUTURE VIC COAT BR UD NDL CTB-1 0 27 JB260

## (undated) DEVICE — MARKER,SKIN,WI/RULER AND LABELS: Brand: MEDLINE

## (undated) DEVICE — TROCAR: Brand: KII® SLEEVE

## (undated) DEVICE — DRAPE,UNDERBUTTOCKS,PCH,STERILE: Brand: MEDLINE

## (undated) DEVICE — CANNULA SEAL

## (undated) DEVICE — ENDO KIT: Brand: MEDLINE INDUSTRIES, INC.

## (undated) DEVICE — PAD,SANITARY,11 IN,MAXI,W/WINGS,N-STRL: Brand: MEDLINE

## (undated) DEVICE — GAUZE,SPONGE,8"X4",12PLY,XRAY,STRL,LF: Brand: MEDLINE

## (undated) DEVICE — BASIC SINGLE BASIN BTC-LF: Brand: MEDLINE INDUSTRIES, INC.

## (undated) DEVICE — BANDAGE ADH W2XL4IN NITRL FAB STRP CURAD

## (undated) DEVICE — SUTURE VCRL SZ 4-0 L27IN ABSRB UD L19MM FS-2 3/8 CIR REV J422H

## (undated) DEVICE — BANDAGE ADH W1XL3IN NAT FAB WVN FLX DURABLE N ADH PD SEAL

## (undated) DEVICE — VCARE MEDIUM, UTERINE MANIPULATOR, VAGINAL-CERVICAL-AHLUWALIA'S-RETRACTOR-ELEVATOR: Brand: VCARE

## (undated) DEVICE — INTENDED FOR TISSUE SEPARATION, AND OTHER PROCEDURES THAT REQUIRE A SHARP SURGICAL BLADE TO PUNCTURE OR CUT.: Brand: BARD-PARKER ® CARBON RIB-BACK BLADES

## (undated) DEVICE — RED RUBBER ROBINSON URETHRAL CATHETER, RADIOPAQUE, SMOOTH ROUNDED TIP, 14 FR (4.7 MM): Brand: DOVER

## (undated) DEVICE — SUREFIT, DUAL DISPERSIVE ELECTRODE, CONTACT QUALITY MONITOR: Brand: SUREFIT

## (undated) DEVICE — SUTURE VCRL + SZ 4-0 L27IN ABSRB WHT FS-2 3/8 CIR REV CUT VCP422H

## (undated) DEVICE — CHLORAPREP 26ML ORANGE

## (undated) DEVICE — TIP COVER ACCESSORY

## (undated) DEVICE — ELECTRO LUBE IS A SINGLE PATIENT USE DEVICE THAT IS INTENDED TO BE USED ON ELECTROSURGICAL ELECTRODES TO REDUCE STICKING.: Brand: KEY SURGICAL ELECTRO LUBE

## (undated) DEVICE — GARMENT,MEDLINE,DVT,INT,CALF,MED, GEN2: Brand: MEDLINE

## (undated) DEVICE — KIT,ANTI FOG,W/SPONGE & FLUID,SOFT PACK: Brand: MEDLINE

## (undated) DEVICE — ARM DRAPE

## (undated) DEVICE — SEALER ENDOSCP L37CM NANO COAT BLNT TIP LAP DIV

## (undated) DEVICE — PACK PROCEDURE SURG GYN ROBOTIC

## (undated) DEVICE — PAD,NON-ADHERENT,3X8,STERILE,LF,1/PK: Brand: MEDLINE

## (undated) DEVICE — AIRSEAL 8 MM ACCESS PORT AND LOW PROFILE OBTURATOR WITH BLADELESS OPTICAL TIP, 120 MM LENGTH: Brand: AIRSEAL

## (undated) DEVICE — ELECTRODE,ECG,STRESS,FOAM,5PK: Brand: MEDLINE

## (undated) DEVICE — SUTURE V-LOC 180 SZ 3-0 L9IN ABSRB GRN L26MM V-20 1/2 CIR VLOCL0644

## (undated) DEVICE — 40595 XL TRENDELENBURG POSITIONING KIT: Brand: 40595 XL TRENDELENBURG POSITIONING KIT

## (undated) DEVICE — CONNECTOR TBNG AUX H2O JET DISP FOR OLY 160/180 SER